# Patient Record
Sex: MALE | Race: BLACK OR AFRICAN AMERICAN | NOT HISPANIC OR LATINO | URBAN - METROPOLITAN AREA
[De-identification: names, ages, dates, MRNs, and addresses within clinical notes are randomized per-mention and may not be internally consistent; named-entity substitution may affect disease eponyms.]

---

## 2022-04-03 VITALS
DIASTOLIC BLOOD PRESSURE: 93 MMHG | OXYGEN SATURATION: 94 % | HEART RATE: 132 BPM | RESPIRATION RATE: 40 BRPM | SYSTOLIC BLOOD PRESSURE: 142 MMHG

## 2022-04-03 LAB
ALBUMIN SERPL ELPH-MCNC: 2.7 G/DL — LOW (ref 3.4–5)
ALP SERPL-CCNC: 65 U/L — SIGNIFICANT CHANGE UP (ref 40–120)
ALT FLD-CCNC: 80 U/L — HIGH (ref 12–42)
ANION GAP SERPL CALC-SCNC: 10 MMOL/L — SIGNIFICANT CHANGE UP (ref 9–16)
APTT BLD: 35.3 SEC — SIGNIFICANT CHANGE UP (ref 27.5–35.5)
AST SERPL-CCNC: 58 U/L — HIGH (ref 15–37)
BASOPHILS # BLD AUTO: 0.02 K/UL — SIGNIFICANT CHANGE UP (ref 0–0.2)
BASOPHILS NFR BLD AUTO: 0.1 % — SIGNIFICANT CHANGE UP (ref 0–2)
BILIRUB SERPL-MCNC: 1.7 MG/DL — HIGH (ref 0.2–1.2)
BUN SERPL-MCNC: 18 MG/DL — SIGNIFICANT CHANGE UP (ref 7–23)
CALCIUM SERPL-MCNC: 8.4 MG/DL — LOW (ref 8.5–10.5)
CHLORIDE SERPL-SCNC: 100 MMOL/L — SIGNIFICANT CHANGE UP (ref 96–108)
CK MB BLD-MCNC: 0.17 % — SIGNIFICANT CHANGE UP
CK MB CFR SERPL CALC: 0.6 NG/ML — SIGNIFICANT CHANGE UP (ref 0.5–3.6)
CO2 SERPL-SCNC: 24 MMOL/L — SIGNIFICANT CHANGE UP (ref 22–31)
CREAT SERPL-MCNC: 1.97 MG/DL — HIGH (ref 0.5–1.3)
EGFR: 42 ML/MIN/1.73M2 — LOW
EOSINOPHIL # BLD AUTO: 0 K/UL — SIGNIFICANT CHANGE UP (ref 0–0.5)
EOSINOPHIL NFR BLD AUTO: 0 % — SIGNIFICANT CHANGE UP (ref 0–6)
GLUCOSE SERPL-MCNC: 148 MG/DL — HIGH (ref 70–99)
HCT VFR BLD CALC: 33.8 % — LOW (ref 39–50)
HGB BLD-MCNC: 11.5 G/DL — LOW (ref 13–17)
IMM GRANULOCYTES NFR BLD AUTO: 0.4 % — SIGNIFICANT CHANGE UP (ref 0–1.5)
INR BLD: 2.27 — HIGH (ref 0.88–1.16)
LACTATE SERPL-SCNC: 3.8 MMOL/L — HIGH (ref 0.4–2)
LYMPHOCYTES # BLD AUTO: 1.28 K/UL — SIGNIFICANT CHANGE UP (ref 1–3.3)
LYMPHOCYTES # BLD AUTO: 9.2 % — LOW (ref 13–44)
MAGNESIUM SERPL-MCNC: 1.6 MG/DL — SIGNIFICANT CHANGE UP (ref 1.6–2.6)
MCHC RBC-ENTMCNC: 28.5 PG — SIGNIFICANT CHANGE UP (ref 27–34)
MCHC RBC-ENTMCNC: 34 GM/DL — SIGNIFICANT CHANGE UP (ref 32–36)
MCV RBC AUTO: 83.7 FL — SIGNIFICANT CHANGE UP (ref 80–100)
MONOCYTES # BLD AUTO: 1.03 K/UL — HIGH (ref 0–0.9)
MONOCYTES NFR BLD AUTO: 7.4 % — SIGNIFICANT CHANGE UP (ref 2–14)
NEUTROPHILS # BLD AUTO: 11.48 K/UL — HIGH (ref 1.8–7.4)
NEUTROPHILS NFR BLD AUTO: 82.9 % — HIGH (ref 43–77)
NRBC # BLD: 0 /100 WBCS — SIGNIFICANT CHANGE UP (ref 0–0)
NT-PROBNP SERPL-SCNC: HIGH PG/ML
PCO2 BLDV: 39 MMHG — LOW (ref 42–55)
PCO2 BLDV: 39 MMHG — LOW (ref 42–55)
PH BLDV: 7.43 — SIGNIFICANT CHANGE UP (ref 7.32–7.43)
PH BLDV: 7.44 — HIGH (ref 7.32–7.43)
PLATELET # BLD AUTO: 283 K/UL — SIGNIFICANT CHANGE UP (ref 150–400)
PO2 BLDV: 113 MMHG — HIGH (ref 25–45)
PO2 BLDV: 35 MMHG — SIGNIFICANT CHANGE UP (ref 25–45)
POTASSIUM SERPL-MCNC: 4.4 MMOL/L — SIGNIFICANT CHANGE UP (ref 3.5–5.3)
POTASSIUM SERPL-SCNC: 4.4 MMOL/L — SIGNIFICANT CHANGE UP (ref 3.5–5.3)
PROT SERPL-MCNC: 7.3 G/DL — SIGNIFICANT CHANGE UP (ref 6.4–8.2)
PROTHROM AB SERPL-ACNC: 26.5 SEC — HIGH (ref 10.5–13.4)
RBC # BLD: 4.04 M/UL — LOW (ref 4.2–5.8)
RBC # FLD: 16.4 % — HIGH (ref 10.3–14.5)
SAO2 % BLDV: 54.5 % — LOW (ref 67–88)
SAO2 % BLDV: 97.9 % — HIGH (ref 67–88)
SODIUM SERPL-SCNC: 134 MMOL/L — SIGNIFICANT CHANGE UP (ref 132–145)
TROPONIN I, HIGH SENSITIVITY RESULT: 170.4 NG/L — HIGH
WBC # BLD: 13.87 K/UL — HIGH (ref 3.8–10.5)
WBC # FLD AUTO: 13.87 K/UL — HIGH (ref 3.8–10.5)

## 2022-04-03 PROCEDURE — 71045 X-RAY EXAM CHEST 1 VIEW: CPT | Mod: 26

## 2022-04-03 RX ORDER — NITROGLYCERIN 6.5 MG
20 CAPSULE, EXTENDED RELEASE ORAL
Qty: 50 | Refills: 0 | Status: DISCONTINUED | OUTPATIENT
Start: 2022-04-03 | End: 2022-04-04

## 2022-04-03 RX ORDER — MORPHINE SULFATE 50 MG/1
2 CAPSULE, EXTENDED RELEASE ORAL ONCE
Refills: 0 | Status: DISCONTINUED | OUTPATIENT
Start: 2022-04-03 | End: 2022-04-03

## 2022-04-03 RX ORDER — FUROSEMIDE 40 MG
80 TABLET ORAL ONCE
Refills: 0 | Status: COMPLETED | OUTPATIENT
Start: 2022-04-03 | End: 2022-04-03

## 2022-04-03 RX ORDER — CEFTRIAXONE 500 MG/1
1000 INJECTION, POWDER, FOR SOLUTION INTRAMUSCULAR; INTRAVENOUS ONCE
Refills: 0 | Status: DISCONTINUED | OUTPATIENT
Start: 2022-04-03 | End: 2022-04-03

## 2022-04-03 RX ORDER — CEFTRIAXONE 500 MG/1
1000 INJECTION, POWDER, FOR SOLUTION INTRAMUSCULAR; INTRAVENOUS ONCE
Refills: 0 | Status: COMPLETED | OUTPATIENT
Start: 2022-04-03 | End: 2022-04-03

## 2022-04-03 RX ADMIN — Medication 10 MILLIGRAM(S): at 23:15

## 2022-04-03 RX ADMIN — CEFTRIAXONE 100 MILLIGRAM(S): 500 INJECTION, POWDER, FOR SOLUTION INTRAMUSCULAR; INTRAVENOUS at 23:58

## 2022-04-03 RX ADMIN — Medication 80 MILLIGRAM(S): at 23:10

## 2022-04-03 RX ADMIN — Medication 6 MICROGRAM(S)/MIN: at 23:16

## 2022-04-03 NOTE — ED PROVIDER NOTE - NSICDXPASTMEDICALHX_GEN_ALL_CORE_FT
PAST MEDICAL HISTORY:  Acute myocardial infarction     Acute on chronic systolic congestive heart failure

## 2022-04-03 NOTE — ED ADULT TRIAGE NOTE - CHIEF COMPLAINT QUOTE
Pt brought in by EMS as a notification for respiratory distress and shortness of breath since yesterday. Arrived with bipap in place. Pt with SpO2 in the 60s on RA as per EMS.

## 2022-04-03 NOTE — ED PROVIDER NOTE - CLINICAL SUMMARY MEDICAL DECISION MAKING FREE TEXT BOX
Pt is severe respiratory distress, stat PCXR reveals pulmonary edema by my reading at bedside. IV nitro ordered for pre-load reduction, iv lasix. Pt with marked improvement in Bipap, sats now 96% 10/5 FIO2 50%. Pt is severe respiratory distress, stat PCXR reveals pulmonary edema by my reading at bedside. IV nitro ordered for pre-load reduction, iv lasix. Pt with marked improvement in Bipap, sats now 96% 10/5 FIO2 50%.    2340: ax temp 102, ceftriaxone ordered, cultures obtained. Discussed with Dr. Colindres CCU attending [facility], accepts pt for admission.

## 2022-04-03 NOTE — ED PROVIDER NOTE - OBJECTIVE STATEMENT
40 yo male pmhx MI, CHF biba for acute respiratory distress. Pt states progressive dyspnea over past 24 hours. Pt arrives ALS with no IV access, on CPAP on arrival. EMS reports sats of 60% on RA upon their arrival. Pt on lasix/metoprolol, xarelto. History is limited secondary to acute respiratory distress.

## 2022-04-04 ENCOUNTER — INPATIENT (INPATIENT)
Facility: HOSPITAL | Age: 42
LOS: 3 days | Discharge: HOME | DRG: 974 | End: 2022-04-08
Attending: INTERNAL MEDICINE | Admitting: INTERNAL MEDICINE
Payer: MEDICARE

## 2022-04-04 DIAGNOSIS — R09.89 OTHER SPECIFIED SYMPTOMS AND SIGNS INVOLVING THE CIRCULATORY AND RESPIRATORY SYSTEMS: ICD-10-CM

## 2022-04-04 LAB
4/8 RATIO: 0.83 RATIO — LOW (ref 0.9–3.6)
A1C WITH ESTIMATED AVERAGE GLUCOSE RESULT: 5.2 % — SIGNIFICANT CHANGE UP (ref 4–5.6)
ABS CD8: 214 /UL — SIGNIFICANT CHANGE UP (ref 142–740)
ALBUMIN SERPL ELPH-MCNC: 2.9 G/DL — LOW (ref 3.3–5)
ALBUMIN SERPL ELPH-MCNC: 3.1 G/DL — LOW (ref 3.3–5)
ALBUMIN SERPL ELPH-MCNC: 3.1 G/DL — LOW (ref 3.3–5)
ALP SERPL-CCNC: 64 U/L — SIGNIFICANT CHANGE UP (ref 40–120)
ALP SERPL-CCNC: 68 U/L — SIGNIFICANT CHANGE UP (ref 40–120)
ALP SERPL-CCNC: 71 U/L — SIGNIFICANT CHANGE UP (ref 40–120)
ALT FLD-CCNC: 63 U/L — HIGH (ref 10–45)
ALT FLD-CCNC: 64 U/L — HIGH (ref 10–45)
ALT FLD-CCNC: 64 U/L — HIGH (ref 10–45)
ANION GAP SERPL CALC-SCNC: 10 MMOL/L — SIGNIFICANT CHANGE UP (ref 5–17)
ANION GAP SERPL CALC-SCNC: 14 MMOL/L — SIGNIFICANT CHANGE UP (ref 5–17)
ANION GAP SERPL CALC-SCNC: 9 MMOL/L — SIGNIFICANT CHANGE UP (ref 5–17)
APPEARANCE UR: CLEAR — SIGNIFICANT CHANGE UP
APTT BLD: 32.3 SEC — SIGNIFICANT CHANGE UP (ref 27.5–35.5)
APTT BLD: 33.8 SEC — SIGNIFICANT CHANGE UP (ref 27.5–35.5)
APTT BLD: 36.7 SEC — HIGH (ref 27.5–35.5)
AST SERPL-CCNC: 53 U/L — HIGH (ref 10–40)
AST SERPL-CCNC: 63 U/L — HIGH (ref 10–40)
AST SERPL-CCNC: 71 U/L — HIGH (ref 10–40)
BACTERIA # UR AUTO: PRESENT /HPF
BASE EXCESS BLDA CALC-SCNC: 3.2 MMOL/L — HIGH (ref -2–3)
BASE EXCESS BLDA CALC-SCNC: 4.2 MMOL/L — HIGH (ref -2–3)
BASOPHILS # BLD AUTO: 0.02 K/UL — SIGNIFICANT CHANGE UP (ref 0–0.2)
BASOPHILS NFR BLD AUTO: 0.1 % — SIGNIFICANT CHANGE UP (ref 0–2)
BILIRUB DIRECT SERPL-MCNC: 0.4 MG/DL — HIGH (ref 0–0.3)
BILIRUB INDIRECT FLD-MCNC: 0.5 MG/DL — SIGNIFICANT CHANGE UP (ref 0.2–1)
BILIRUB SERPL-MCNC: 0.8 MG/DL — SIGNIFICANT CHANGE UP (ref 0.2–1.2)
BILIRUB SERPL-MCNC: 1.2 MG/DL — SIGNIFICANT CHANGE UP (ref 0.2–1.2)
BILIRUB SERPL-MCNC: 1.6 MG/DL — HIGH (ref 0.2–1.2)
BILIRUB UR-MCNC: NEGATIVE — SIGNIFICANT CHANGE UP
BLD GP AB SCN SERPL QL: NEGATIVE — SIGNIFICANT CHANGE UP
BLD GP AB SCN SERPL QL: NEGATIVE — SIGNIFICANT CHANGE UP
BUN SERPL-MCNC: 19 MG/DL — SIGNIFICANT CHANGE UP (ref 7–23)
BUN SERPL-MCNC: 19 MG/DL — SIGNIFICANT CHANGE UP (ref 7–23)
BUN SERPL-MCNC: 21 MG/DL — SIGNIFICANT CHANGE UP (ref 7–23)
CALCIUM SERPL-MCNC: 8.2 MG/DL — LOW (ref 8.4–10.5)
CALCIUM SERPL-MCNC: 8.4 MG/DL — SIGNIFICANT CHANGE UP (ref 8.4–10.5)
CALCIUM SERPL-MCNC: 8.5 MG/DL — SIGNIFICANT CHANGE UP (ref 8.4–10.5)
CD16+CD56+ CELLS NFR BLD: 8 % — SIGNIFICANT CHANGE UP (ref 5–23)
CD16+CD56+ CELLS NFR SPEC: 55 /UL — LOW (ref 71–410)
CD19 BLASTS SPEC-ACNC: 204 /UL — SIGNIFICANT CHANGE UP (ref 84–469)
CD19 BLASTS SPEC-ACNC: 29 % — HIGH (ref 6–24)
CD3 BLASTS SPEC-ACNC: 413 /UL — LOW (ref 672–1870)
CD3 BLASTS SPEC-ACNC: 62 % — SIGNIFICANT CHANGE UP (ref 59–83)
CD4 %: 28 % — LOW (ref 30–62)
CD8 %: 34 % — SIGNIFICANT CHANGE UP (ref 12–36)
CHLORIDE SERPL-SCNC: 100 MMOL/L — SIGNIFICANT CHANGE UP (ref 96–108)
CHOLEST SERPL-MCNC: 73 MG/DL — SIGNIFICANT CHANGE UP
CK MB CFR SERPL CALC: 2.1 NG/ML — SIGNIFICANT CHANGE UP (ref 0–6.7)
CK SERPL-CCNC: 344 U/L — HIGH (ref 30–200)
CO2 BLDA-SCNC: 30 MMOL/L — HIGH (ref 19–24)
CO2 BLDA-SCNC: 30 MMOL/L — HIGH (ref 19–24)
CO2 SERPL-SCNC: 23 MMOL/L — SIGNIFICANT CHANGE UP (ref 22–31)
CO2 SERPL-SCNC: 27 MMOL/L — SIGNIFICANT CHANGE UP (ref 22–31)
CO2 SERPL-SCNC: 27 MMOL/L — SIGNIFICANT CHANGE UP (ref 22–31)
COLOR SPEC: YELLOW — SIGNIFICANT CHANGE UP
COMMENT - URINE: SIGNIFICANT CHANGE UP
CREAT ?TM UR-MCNC: 120 MG/DL — SIGNIFICANT CHANGE UP
CREAT SERPL-MCNC: 1.6 MG/DL — HIGH (ref 0.5–1.3)
CREAT SERPL-MCNC: 1.61 MG/DL — HIGH (ref 0.5–1.3)
CREAT SERPL-MCNC: 1.76 MG/DL — HIGH (ref 0.5–1.3)
CRP SERPL-MCNC: 275 MG/L — HIGH (ref 0–4)
D DIMER BLD IA.RAPID-MCNC: 629 NG/ML DDU — HIGH
DIFF PNL FLD: ABNORMAL
EGFR: 49 ML/MIN/1.73M2 — LOW
EGFR: 54 ML/MIN/1.73M2 — LOW
EGFR: 54 ML/MIN/1.73M2 — LOW
EOSINOPHIL # BLD AUTO: 0 K/UL — SIGNIFICANT CHANGE UP (ref 0–0.5)
EOSINOPHIL NFR BLD AUTO: 0 % — SIGNIFICANT CHANGE UP (ref 0–6)
EPI CELLS # UR: SIGNIFICANT CHANGE UP /HPF (ref 0–5)
ERYTHROCYTE [SEDIMENTATION RATE] IN BLOOD: 75 MM/HR — HIGH
ESTIMATED AVERAGE GLUCOSE: 103 MG/DL — SIGNIFICANT CHANGE UP (ref 68–114)
FERRITIN SERPL-MCNC: 472 NG/ML — HIGH (ref 30–400)
FLUAV AG NPH QL: SIGNIFICANT CHANGE UP
FLUBV AG NPH QL: SIGNIFICANT CHANGE UP
GAS PNL BLDA: SIGNIFICANT CHANGE UP
GLUCOSE BLDC GLUCOMTR-MCNC: 142 MG/DL — HIGH (ref 70–99)
GLUCOSE BLDC GLUCOMTR-MCNC: 144 MG/DL — HIGH (ref 70–99)
GLUCOSE BLDC GLUCOMTR-MCNC: 175 MG/DL — HIGH (ref 70–99)
GLUCOSE BLDC GLUCOMTR-MCNC: 200 MG/DL — HIGH (ref 70–99)
GLUCOSE SERPL-MCNC: 130 MG/DL — HIGH (ref 70–99)
GLUCOSE SERPL-MCNC: 162 MG/DL — HIGH (ref 70–99)
GLUCOSE SERPL-MCNC: 169 MG/DL — HIGH (ref 70–99)
GLUCOSE UR QL: NEGATIVE — SIGNIFICANT CHANGE UP
HAV IGM SER-ACNC: SIGNIFICANT CHANGE UP
HBV CORE IGM SER-ACNC: SIGNIFICANT CHANGE UP
HCO3 BLDA-SCNC: 28 MMOL/L — SIGNIFICANT CHANGE UP (ref 21–28)
HCO3 BLDA-SCNC: 28 MMOL/L — SIGNIFICANT CHANGE UP (ref 21–28)
HCT VFR BLD CALC: 31.8 % — LOW (ref 39–50)
HCT VFR BLD CALC: 32.5 % — LOW (ref 39–50)
HCV AB S/CO SERPL IA: 0.04 S/CO — SIGNIFICANT CHANGE UP
HCV AB SERPL-IMP: SIGNIFICANT CHANGE UP
HDLC SERPL-MCNC: 27 MG/DL — LOW
HGB BLD-MCNC: 11 G/DL — LOW (ref 13–17)
HGB BLD-MCNC: 11.2 G/DL — LOW (ref 13–17)
HYALINE CASTS # UR AUTO: ABNORMAL /LPF (ref 0–2)
IMM GRANULOCYTES NFR BLD AUTO: 1 % — SIGNIFICANT CHANGE UP (ref 0–1.5)
INR BLD: 1.82 — HIGH (ref 0.88–1.16)
KETONES UR-MCNC: NEGATIVE — SIGNIFICANT CHANGE UP
LACTATE SERPL-SCNC: 1.4 MMOL/L — SIGNIFICANT CHANGE UP (ref 0.5–2)
LACTATE SERPL-SCNC: 1.6 MMOL/L — SIGNIFICANT CHANGE UP (ref 0.5–2)
LDH SERPL L TO P-CCNC: 365 U/L — HIGH (ref 50–242)
LDH SERPL L TO P-CCNC: 456 U/L — HIGH (ref 50–242)
LEGIONELLA AG UR QL: NEGATIVE — SIGNIFICANT CHANGE UP
LEUKOCYTE ESTERASE UR-ACNC: ABNORMAL
LIPID PNL WITH DIRECT LDL SERPL: 30 MG/DL — SIGNIFICANT CHANGE UP
LYMPHOCYTES # BLD AUTO: 0.83 K/UL — LOW (ref 1–3.3)
LYMPHOCYTES # BLD AUTO: 5.8 % — LOW (ref 13–44)
MAGNESIUM SERPL-MCNC: 1.8 MG/DL — SIGNIFICANT CHANGE UP (ref 1.6–2.6)
MAGNESIUM SERPL-MCNC: 2.3 MG/DL — SIGNIFICANT CHANGE UP (ref 1.6–2.6)
MCHC RBC-ENTMCNC: 28.3 PG — SIGNIFICANT CHANGE UP (ref 27–34)
MCHC RBC-ENTMCNC: 28.5 PG — SIGNIFICANT CHANGE UP (ref 27–34)
MCHC RBC-ENTMCNC: 34.5 GM/DL — SIGNIFICANT CHANGE UP (ref 32–36)
MCHC RBC-ENTMCNC: 34.6 GM/DL — SIGNIFICANT CHANGE UP (ref 32–36)
MCV RBC AUTO: 81.7 FL — SIGNIFICANT CHANGE UP (ref 80–100)
MCV RBC AUTO: 82.7 FL — SIGNIFICANT CHANGE UP (ref 80–100)
MONOCYTES # BLD AUTO: 0.74 K/UL — SIGNIFICANT CHANGE UP (ref 0–0.9)
MONOCYTES NFR BLD AUTO: 5.1 % — SIGNIFICANT CHANGE UP (ref 2–14)
MRSA PCR RESULT.: NEGATIVE — SIGNIFICANT CHANGE UP
NEUTROPHILS # BLD AUTO: 12.66 K/UL — HIGH (ref 1.8–7.4)
NEUTROPHILS NFR BLD AUTO: 88 % — HIGH (ref 43–77)
NITRITE UR-MCNC: NEGATIVE — SIGNIFICANT CHANGE UP
NON HDL CHOLESTEROL: 46 MG/DL — SIGNIFICANT CHANGE UP
NRBC # BLD: 0 /100 WBCS — SIGNIFICANT CHANGE UP (ref 0–0)
NRBC # BLD: 0 /100 WBCS — SIGNIFICANT CHANGE UP (ref 0–0)
NT-PROBNP SERPL-SCNC: HIGH PG/ML (ref 0–300)
OSMOLALITY SERPL: 292 MOSM/KG — SIGNIFICANT CHANGE UP (ref 275–300)
OSMOLALITY UR: 372 MOSM/KG — SIGNIFICANT CHANGE UP (ref 300–900)
PCO2 BLDA: 40 MMHG — SIGNIFICANT CHANGE UP (ref 35–48)
PCO2 BLDA: 45 MMHG — SIGNIFICANT CHANGE UP (ref 35–48)
PCO2 BLDV: 39 MMHG — LOW (ref 42–55)
PH BLDA: 7.41 — SIGNIFICANT CHANGE UP (ref 7.35–7.45)
PH BLDA: 7.46 — HIGH (ref 7.35–7.45)
PH BLDV: 7.46 — HIGH (ref 7.32–7.43)
PH UR: 5.5 — SIGNIFICANT CHANGE UP (ref 5–8)
PHOSPHATE SERPL-MCNC: 3.4 MG/DL — SIGNIFICANT CHANGE UP (ref 2.5–4.5)
PLATELET # BLD AUTO: 256 K/UL — SIGNIFICANT CHANGE UP (ref 150–400)
PLATELET # BLD AUTO: 259 K/UL — SIGNIFICANT CHANGE UP (ref 150–400)
PO2 BLDA: 100 MMHG — SIGNIFICANT CHANGE UP (ref 83–108)
PO2 BLDA: 128 MMHG — HIGH (ref 83–108)
PO2 BLDV: 186 MMHG — HIGH (ref 25–45)
POTASSIUM SERPL-MCNC: 4.4 MMOL/L — SIGNIFICANT CHANGE UP (ref 3.5–5.3)
POTASSIUM SERPL-MCNC: 4.5 MMOL/L — SIGNIFICANT CHANGE UP (ref 3.5–5.3)
POTASSIUM SERPL-MCNC: 4.7 MMOL/L — SIGNIFICANT CHANGE UP (ref 3.5–5.3)
POTASSIUM SERPL-SCNC: 4.4 MMOL/L — SIGNIFICANT CHANGE UP (ref 3.5–5.3)
POTASSIUM SERPL-SCNC: 4.5 MMOL/L — SIGNIFICANT CHANGE UP (ref 3.5–5.3)
POTASSIUM SERPL-SCNC: 4.7 MMOL/L — SIGNIFICANT CHANGE UP (ref 3.5–5.3)
PROCALCITONIN SERPL-MCNC: 5.27 NG/ML — HIGH (ref 0.02–0.1)
PROT SERPL-MCNC: 6.6 G/DL — SIGNIFICANT CHANGE UP (ref 6–8.3)
PROT SERPL-MCNC: 7.1 G/DL — SIGNIFICANT CHANGE UP (ref 6–8.3)
PROT SERPL-MCNC: 7.3 G/DL — SIGNIFICANT CHANGE UP (ref 6–8.3)
PROT UR-MCNC: NEGATIVE MG/DL — SIGNIFICANT CHANGE UP
PROTHROM AB SERPL-ACNC: 21.8 SEC — HIGH (ref 10.5–13.4)
RBC # BLD: 3.89 M/UL — LOW (ref 4.2–5.8)
RBC # BLD: 3.93 M/UL — LOW (ref 4.2–5.8)
RBC # FLD: 16 % — HIGH (ref 10.3–14.5)
RBC # FLD: 16.4 % — HIGH (ref 10.3–14.5)
RBC CASTS # UR COMP ASSIST: ABNORMAL /HPF
RH IG SCN BLD-IMP: POSITIVE — SIGNIFICANT CHANGE UP
RH IG SCN BLD-IMP: POSITIVE — SIGNIFICANT CHANGE UP
RSV RNA NPH QL NAA+NON-PROBE: SIGNIFICANT CHANGE UP
S AUREUS DNA NOSE QL NAA+PROBE: NEGATIVE — SIGNIFICANT CHANGE UP
S PNEUM AG UR QL: NEGATIVE — SIGNIFICANT CHANGE UP
SAO2 % BLDA: 97.1 % — SIGNIFICANT CHANGE UP (ref 94–98)
SAO2 % BLDA: 98.1 % — HIGH (ref 94–98)
SAO2 % BLDV: 98.5 % — HIGH (ref 67–88)
SARS-COV-2 RNA SPEC QL NAA+PROBE: SIGNIFICANT CHANGE UP
SARS-COV-2 RNA SPEC QL NAA+PROBE: SIGNIFICANT CHANGE UP
SODIUM SERPL-SCNC: 136 MMOL/L — SIGNIFICANT CHANGE UP (ref 135–145)
SODIUM SERPL-SCNC: 137 MMOL/L — SIGNIFICANT CHANGE UP (ref 135–145)
SODIUM SERPL-SCNC: 137 MMOL/L — SIGNIFICANT CHANGE UP (ref 135–145)
SODIUM UR-SCNC: 41 MMOL/L — SIGNIFICANT CHANGE UP
SP GR SPEC: 1.02 — SIGNIFICANT CHANGE UP (ref 1–1.03)
T-CELL CD4 SUBSET PNL BLD: 178 /UL — LOW (ref 489–1457)
TRIGL SERPL-MCNC: 78 MG/DL — SIGNIFICANT CHANGE UP
TROPONIN T SERPL-MCNC: 0.03 NG/ML — HIGH (ref 0–0.01)
TROPONIN T SERPL-MCNC: <0.01 NG/ML — SIGNIFICANT CHANGE UP (ref 0–0.01)
UROBILINOGEN FLD QL: 1 E.U./DL — SIGNIFICANT CHANGE UP
UUN UR-MCNC: 444 MG/DL — SIGNIFICANT CHANGE UP
WBC # BLD: 13.45 K/UL — HIGH (ref 3.8–10.5)
WBC # BLD: 14.39 K/UL — HIGH (ref 3.8–10.5)
WBC # FLD AUTO: 13.45 K/UL — HIGH (ref 3.8–10.5)
WBC # FLD AUTO: 14.39 K/UL — HIGH (ref 3.8–10.5)
WBC UR QL: > 10 /HPF

## 2022-04-04 PROCEDURE — 36600 WITHDRAWAL OF ARTERIAL BLOOD: CPT

## 2022-04-04 PROCEDURE — 99053 MED SERV 10PM-8AM 24 HR FAC: CPT

## 2022-04-04 PROCEDURE — 93308 TTE F-UP OR LMTD: CPT | Mod: 26

## 2022-04-04 PROCEDURE — 93010 ELECTROCARDIOGRAM REPORT: CPT

## 2022-04-04 PROCEDURE — 93306 TTE W/DOPPLER COMPLETE: CPT | Mod: 26

## 2022-04-04 PROCEDURE — 93970 EXTREMITY STUDY: CPT | Mod: 26

## 2022-04-04 PROCEDURE — 99291 CRITICAL CARE FIRST HOUR: CPT

## 2022-04-04 PROCEDURE — 99292 CRITICAL CARE ADDL 30 MIN: CPT

## 2022-04-04 PROCEDURE — 71045 X-RAY EXAM CHEST 1 VIEW: CPT | Mod: 26

## 2022-04-04 RX ORDER — GLUCAGON INJECTION, SOLUTION 0.5 MG/.1ML
1 INJECTION, SOLUTION SUBCUTANEOUS ONCE
Refills: 0 | Status: DISCONTINUED | OUTPATIENT
Start: 2022-04-04 | End: 2022-04-08

## 2022-04-04 RX ORDER — PANTOPRAZOLE SODIUM 20 MG/1
40 TABLET, DELAYED RELEASE ORAL
Refills: 0 | Status: DISCONTINUED | OUTPATIENT
Start: 2022-04-04 | End: 2022-04-04

## 2022-04-04 RX ORDER — SODIUM CHLORIDE 9 MG/ML
1000 INJECTION, SOLUTION INTRAVENOUS
Refills: 0 | Status: DISCONTINUED | OUTPATIENT
Start: 2022-04-04 | End: 2022-04-08

## 2022-04-04 RX ORDER — FUROSEMIDE 40 MG
5 TABLET ORAL
Qty: 500 | Refills: 0 | Status: DISCONTINUED | OUTPATIENT
Start: 2022-04-04 | End: 2022-04-04

## 2022-04-04 RX ORDER — ATORVASTATIN CALCIUM 80 MG/1
80 TABLET, FILM COATED ORAL AT BEDTIME
Refills: 0 | Status: DISCONTINUED | OUTPATIENT
Start: 2022-04-04 | End: 2022-04-07

## 2022-04-04 RX ORDER — VANCOMYCIN HCL 1 G
1500 VIAL (EA) INTRAVENOUS ONCE
Refills: 0 | Status: COMPLETED | OUTPATIENT
Start: 2022-04-04 | End: 2022-04-04

## 2022-04-04 RX ORDER — HEPARIN SODIUM 5000 [USP'U]/ML
1000 INJECTION INTRAVENOUS; SUBCUTANEOUS
Qty: 25000 | Refills: 0 | Status: DISCONTINUED | OUTPATIENT
Start: 2022-04-04 | End: 2022-04-04

## 2022-04-04 RX ORDER — CLOPIDOGREL BISULFATE 75 MG/1
1 TABLET, FILM COATED ORAL
Qty: 0 | Refills: 0 | DISCHARGE

## 2022-04-04 RX ORDER — DEXTROSE 50 % IN WATER 50 %
25 SYRINGE (ML) INTRAVENOUS ONCE
Refills: 0 | Status: DISCONTINUED | OUTPATIENT
Start: 2022-04-04 | End: 2022-04-08

## 2022-04-04 RX ORDER — PIPERACILLIN AND TAZOBACTAM 4; .5 G/20ML; G/20ML
3.38 INJECTION, POWDER, LYOPHILIZED, FOR SOLUTION INTRAVENOUS EVERY 6 HOURS
Refills: 0 | Status: DISCONTINUED | OUTPATIENT
Start: 2022-04-04 | End: 2022-04-07

## 2022-04-04 RX ORDER — DEXTROSE 50 % IN WATER 50 %
15 SYRINGE (ML) INTRAVENOUS ONCE
Refills: 0 | Status: DISCONTINUED | OUTPATIENT
Start: 2022-04-04 | End: 2022-04-08

## 2022-04-04 RX ORDER — ACETAMINOPHEN 500 MG
975 TABLET ORAL ONCE
Refills: 0 | Status: COMPLETED | OUTPATIENT
Start: 2022-04-04 | End: 2022-04-04

## 2022-04-04 RX ORDER — MAGNESIUM SULFATE 500 MG/ML
2 VIAL (ML) INJECTION ONCE
Refills: 0 | Status: COMPLETED | OUTPATIENT
Start: 2022-04-04 | End: 2022-04-04

## 2022-04-04 RX ORDER — HEPARIN SODIUM 5000 [USP'U]/ML
1900 INJECTION INTRAVENOUS; SUBCUTANEOUS
Qty: 25000 | Refills: 0 | Status: DISCONTINUED | OUTPATIENT
Start: 2022-04-04 | End: 2022-04-05

## 2022-04-04 RX ORDER — DEXAMETHASONE 0.5 MG/5ML
6 ELIXIR ORAL DAILY
Refills: 0 | Status: DISCONTINUED | OUTPATIENT
Start: 2022-04-04 | End: 2022-04-04

## 2022-04-04 RX ORDER — DEXTROSE 50 % IN WATER 50 %
12.5 SYRINGE (ML) INTRAVENOUS ONCE
Refills: 0 | Status: DISCONTINUED | OUTPATIENT
Start: 2022-04-04 | End: 2022-04-08

## 2022-04-04 RX ORDER — PANTOPRAZOLE SODIUM 20 MG/1
40 TABLET, DELAYED RELEASE ORAL EVERY 24 HOURS
Refills: 0 | Status: DISCONTINUED | OUTPATIENT
Start: 2022-04-04 | End: 2022-04-04

## 2022-04-04 RX ORDER — PIPERACILLIN AND TAZOBACTAM 4; .5 G/20ML; G/20ML
3.38 INJECTION, POWDER, LYOPHILIZED, FOR SOLUTION INTRAVENOUS ONCE
Refills: 0 | Status: COMPLETED | OUTPATIENT
Start: 2022-04-04 | End: 2022-04-04

## 2022-04-04 RX ORDER — BICTEGRAVIR SODIUM, EMTRICITABINE, AND TENOFOVIR ALAFENAMIDE FUMARATE 30; 120; 15 MG/1; MG/1; MG/1
1 TABLET ORAL
Qty: 0 | Refills: 0 | DISCHARGE

## 2022-04-04 RX ORDER — ASPIRIN/CALCIUM CARB/MAGNESIUM 324 MG
1 TABLET ORAL
Qty: 0 | Refills: 0 | DISCHARGE

## 2022-04-04 RX ORDER — SODIUM CHLORIDE 9 MG/ML
4 INJECTION INTRAMUSCULAR; INTRAVENOUS; SUBCUTANEOUS ONCE
Refills: 0 | Status: COMPLETED | OUTPATIENT
Start: 2022-04-04 | End: 2022-04-04

## 2022-04-04 RX ORDER — CHLORHEXIDINE GLUCONATE 213 G/1000ML
1 SOLUTION TOPICAL
Refills: 0 | Status: DISCONTINUED | OUTPATIENT
Start: 2022-04-04 | End: 2022-04-08

## 2022-04-04 RX ORDER — PANTOPRAZOLE SODIUM 20 MG/1
40 TABLET, DELAYED RELEASE ORAL
Refills: 0 | Status: DISCONTINUED | OUTPATIENT
Start: 2022-04-05 | End: 2022-04-08

## 2022-04-04 RX ORDER — METOPROLOL TARTRATE 50 MG
1 TABLET ORAL
Qty: 0 | Refills: 0 | DISCHARGE

## 2022-04-04 RX ORDER — CHLORHEXIDINE GLUCONATE 213 G/1000ML
1 SOLUTION TOPICAL
Refills: 0 | Status: DISCONTINUED | OUTPATIENT
Start: 2022-04-04 | End: 2022-04-04

## 2022-04-04 RX ORDER — CLOPIDOGREL BISULFATE 75 MG/1
75 TABLET, FILM COATED ORAL DAILY
Refills: 0 | Status: DISCONTINUED | OUTPATIENT
Start: 2022-04-04 | End: 2022-04-08

## 2022-04-04 RX ORDER — DEXAMETHASONE 0.5 MG/5ML
10 ELIXIR ORAL ONCE
Refills: 0 | Status: COMPLETED | OUTPATIENT
Start: 2022-04-04 | End: 2022-04-03

## 2022-04-04 RX ORDER — INSULIN LISPRO 100/ML
VIAL (ML) SUBCUTANEOUS
Refills: 0 | Status: DISCONTINUED | OUTPATIENT
Start: 2022-04-04 | End: 2022-04-08

## 2022-04-04 RX ORDER — BICTEGRAVIR SODIUM, EMTRICITABINE, AND TENOFOVIR ALAFENAMIDE FUMARATE 30; 120; 15 MG/1; MG/1; MG/1
1 TABLET ORAL DAILY
Refills: 0 | Status: DISCONTINUED | OUTPATIENT
Start: 2022-04-04 | End: 2022-04-08

## 2022-04-04 RX ORDER — HEPARIN SODIUM 5000 [USP'U]/ML
1400 INJECTION INTRAVENOUS; SUBCUTANEOUS
Qty: 25000 | Refills: 0 | Status: DISCONTINUED | OUTPATIENT
Start: 2022-04-04 | End: 2022-04-04

## 2022-04-04 RX ORDER — INSULIN LISPRO 100/ML
VIAL (ML) SUBCUTANEOUS EVERY 6 HOURS
Refills: 0 | Status: DISCONTINUED | OUTPATIENT
Start: 2022-04-04 | End: 2022-04-04

## 2022-04-04 RX ADMIN — Medication 975 MILLIGRAM(S): at 00:05

## 2022-04-04 RX ADMIN — BICTEGRAVIR SODIUM, EMTRICITABINE, AND TENOFOVIR ALAFENAMIDE FUMARATE 1 TABLET(S): 30; 120; 15 TABLET ORAL at 11:48

## 2022-04-04 RX ADMIN — Medication 1: at 07:49

## 2022-04-04 RX ADMIN — PIPERACILLIN AND TAZOBACTAM 3.33 GRAM(S): 4; .5 INJECTION, POWDER, LYOPHILIZED, FOR SOLUTION INTRAVENOUS at 10:31

## 2022-04-04 RX ADMIN — MORPHINE SULFATE 2 MILLIGRAM(S): 50 CAPSULE, EXTENDED RELEASE ORAL at 00:07

## 2022-04-04 RX ADMIN — HEPARIN SODIUM 10 UNIT(S)/HR: 5000 INJECTION INTRAVENOUS; SUBCUTANEOUS at 04:05

## 2022-04-04 RX ADMIN — ATORVASTATIN CALCIUM 80 MILLIGRAM(S): 80 TABLET, FILM COATED ORAL at 22:20

## 2022-04-04 RX ADMIN — Medication 265.63 MILLIGRAM(S): at 19:33

## 2022-04-04 RX ADMIN — Medication 2.5 MG/HR: at 03:13

## 2022-04-04 RX ADMIN — PIPERACILLIN AND TAZOBACTAM 3.33 GRAM(S): 4; .5 INJECTION, POWDER, LYOPHILIZED, FOR SOLUTION INTRAVENOUS at 22:12

## 2022-04-04 RX ADMIN — PIPERACILLIN AND TAZOBACTAM 200 GRAM(S): 4; .5 INJECTION, POWDER, LYOPHILIZED, FOR SOLUTION INTRAVENOUS at 03:31

## 2022-04-04 RX ADMIN — PANTOPRAZOLE SODIUM 40 MILLIGRAM(S): 20 TABLET, DELAYED RELEASE ORAL at 05:55

## 2022-04-04 RX ADMIN — CLOPIDOGREL BISULFATE 75 MILLIGRAM(S): 75 TABLET, FILM COATED ORAL at 11:48

## 2022-04-04 RX ADMIN — Medication 25 GRAM(S): at 04:25

## 2022-04-04 RX ADMIN — Medication 300 MILLIGRAM(S): at 04:48

## 2022-04-04 RX ADMIN — SODIUM CHLORIDE 4 MILLILITER(S): 9 INJECTION INTRAMUSCULAR; INTRAVENOUS; SUBCUTANEOUS at 19:19

## 2022-04-04 RX ADMIN — Medication 40 MILLIGRAM(S): at 18:54

## 2022-04-04 RX ADMIN — PIPERACILLIN AND TAZOBACTAM 3.33 GRAM(S): 4; .5 INJECTION, POWDER, LYOPHILIZED, FOR SOLUTION INTRAVENOUS at 15:43

## 2022-04-04 NOTE — ED ADULT NURSE REASSESSMENT NOTE - NS ED NURSE REASSESS COMMENT FT1
Pt. worked up for CHF exacerbation and sepsis. Pt. currently remains on BiPap and tolerating well. Pt. remains on continuous pulse ox and cardiac monitoring awaiting transfer to [facility].

## 2022-04-04 NOTE — H&P ADULT - NSHPLABSRESULTS_GEN_ALL_CORE
LABS:                         11.0   14.39 >-----< 256           ( 22 @ 03:29 )             31.8       134  |  100  |   18  ----------------------< 148    (22 @ 23:18)     4.4  |  24  |  1.97    Anion Gap: 10    Ca   8.4   (22 @ 23:18)  Mg   1.6   (22 @ 23:18)  Phos x    (22 23:18)       TP 8.4     |  AST 2.7    -------------------------     Alb x     |  ALT x               (22 @ 23:18)  -------------------------     T-bili 2.7  |  AlkPh 65    D-bili x   COAGULATION STUDIES:     aPTT  35.3 sec    (22 @ 23:18)     PT     26.5 sec    (22 @ 23:18)     INR    2.27          (22 @ 23:18)   ABG - ( 2022 03:05 )  pH, Arterial: 7.41  pH, Blood: x     /  pCO2: 45    /  pO2: 128   / HCO3: 28    / Base Excess: 3.2   /  SaO2: 98.1              POCT Blood Glucose.: 142 mg/dL (22 @ 23:12)    Urinalysis Basic - ( 2022 02:44 )    Color: Yellow / Appearance: Clear / S.020 / pH: x  Gluc: x / Ketone: NEGATIVE  / Bili: Negative / Urobili: 1.0 E.U./dL   Blood: x / Protein: NEGATIVE mg/dL / Nitrite: NEGATIVE   Leuk Esterase: Small / RBC: 5-10 /HPF / WBC > 10 /HPF   Sq Epi: x / Non Sq Epi: 0-5 /HPF / Bacteria: Present /HPF      I/O SUMMARY:    22 @ 07:01  -  22 @ 03:36  --------------------------------------------------------  IN: 25 mL / OUT: 770 mL / NET: -745 mL

## 2022-04-04 NOTE — PROGRESS NOTE ADULT - SUBJECTIVE AND OBJECTIVE BOX
CCU Progress Note  Jon Jon, PGY-1  Pager: [phone]    ******INCOMPLETE******    Patient is a 41y old  Male who presents with a chief complaint of   OVERNIGHT EVENTS/INTERVAL HPI:    REVIEW OF SYSTEMS:  All other review of systems is negative unless indicated above.    OBJECTIVE:  T(C): 36.8 (22 @ 06:28), Max: 39.2 (22 @ 23:53)  HR: 88 (22 @ 07:00) (88 - 132)  BP: 100/65 (22 @ 07:00) (95/70 - 147/65)  RR: 30 (22 @ 07:00) (25 - 60)  SpO2: 97% (22 @ 07:00) (90% - 99%)  Daily Height in cm: 187.96 (2022 01:59)    Daily     Physical Exam:  General: in no acute distress  Eyes: EOMI intact bilaterally. Anicteric sclerae, moist conjunctivae  HENT: Moist mucous membranes  Neck: Trachea midline, supple  Lungs: CTA B/L. No wheezes, rales, or ronchi  Cardiovascular: RRR. No murmurs, rubs, or gallops  Abdomen: Soft, non-tender non-distended; No rebound or guarding  Extremities: Normal range of motion, No clubbing, cyanosis or edema  MSK: No midline bony tenderness. No CVA tenderness bilaterally  Neurological: Alert and oriented x3  Skin: Warm and dry. No obvious rash     Medications:  MEDICATIONS  (STANDING):  atorvastatin 80 milliGRAM(s) Oral at bedtime  bictegravir 50 mG/emtricitabine 200 mG/tenofovir alafenamide 25 mG (BIKTARVY) 1 Tablet(s) Oral daily  chlorhexidine 4% Liquid 1 Application(s) Topical <User Schedule>  clopidogrel Tablet 75 milliGRAM(s) Oral daily  dexAMETHasone     Tablet 6 milliGRAM(s) Oral daily  dextrose 5%. 1000 milliLiter(s) (50 mL/Hr) IV Continuous <Continuous>  dextrose 5%. 1000 milliLiter(s) (100 mL/Hr) IV Continuous <Continuous>  dextrose 50% Injectable 25 Gram(s) IV Push once  dextrose 50% Injectable 12.5 Gram(s) IV Push once  dextrose 50% Injectable 25 Gram(s) IV Push once  furosemide Infusion 5 mG/Hr (2.5 mL/Hr) IV Continuous <Continuous>  glucagon  Injectable 1 milliGRAM(s) IntraMuscular once  heparin  Infusion 1000 Unit(s)/Hr (10 mL/Hr) IV Continuous <Continuous>  insulin lispro (ADMELOG) corrective regimen sliding scale   SubCutaneous three times a day before meals  pantoprazole  Injectable 40 milliGRAM(s) IV Push every 24 hours  piperacillin/tazobactam IVPB.. 3.375 Gram(s) IV Intermittent every 6 hours    MEDICATIONS  (PRN):  dextrose Oral Gel 15 Gram(s) Oral once PRN Blood Glucose LESS THAN 70 milliGRAM(s)/deciliter      Labs:                        11.0   14.39 )-----------( 256      ( 2022 03:29 )             31.8     04-04    137  |  100  |  19  ----------------------------<  130<H>  4.4   |  23  |  1.76<H>    Ca    8.4      2022 03:29  Phos  3.4     04-04  Mg     1.8     04-04    TPro  7.1  /  Alb  3.1<L>  /  TBili  1.6<H>  /  DBili  x   /  AST  63<H>  /  ALT  63<H>  /  AlkPhos  71  04-04    PT/INR - ( 2022 03:29 )   PT: 21.8 sec;   INR: 1.82          PTT - ( 2022 03:29 )  PTT:32.3 sec  Urinalysis Basic - ( 2022 02:44 )    Color: Yellow / Appearance: Clear / S.020 / pH: x  Gluc: x / Ketone: NEGATIVE  / Bili: Negative / Urobili: 1.0 E.U./dL   Blood: x / Protein: NEGATIVE mg/dL / Nitrite: NEGATIVE   Leuk Esterase: Small / RBC: 5-10 /HPF / WBC > 10 /HPF   Sq Epi: x / Non Sq Epi: 0-5 /HPF / Bacteria: Present /HPF          Radiology: Reviewed CCU Progress Note  Jon Jon, PGY-1  Pager: [phone]    ******INCOMPLETE******    Patient is a 41y old  Male who presents with a chief complaint of     OVERNIGHT EVENTS/INTERVAL HPI: As per night team, no overnight events. Patient seen and examined at bedside. ________________. Otherwise, no complaints at this time. Patient denies fevers, sweats, chills, SOB, dyspnea, chest pain, nausea/vomiting, diarrhea, constipation, abdominal pain. 12 pt ROS otherwise negative.     REVIEW OF SYSTEMS:  All other review of systems is negative unless indicated above.    OBJECTIVE:  T(C): 36.8 (22 @ 06:28), Max: 39.2 (22 @ 23:53)  HR: 88 (22 @ 07:00) (88 - 132)  BP: 100/65 (22 @ 07:00) (95/70 - 147/65)  RR: 30 (22 @ 07:00) (25 - 60)  SpO2: 97% (22 @ 07:00) (90% - 99%)  Daily Height in cm: 187.96 (2022 01:59)    Daily     Physical Exam:  General: in no acute distress  Eyes: EOMI intact bilaterally. Anicteric sclerae, moist conjunctivae  HENT: Moist mucous membranes  Neck: Trachea midline, supple  Lungs: CTA B/L. No wheezes, rales, or ronchi  Cardiovascular: RRR. No murmurs, rubs, or gallops  Abdomen: Soft, non-tender non-distended; No rebound or guarding  Extremities: WWP, No clubbing, cyanosis. 2+ pitting edema  Neurological: Alert and oriented x3  Skin: Warm and dry. No obvious rash     Medications:  MEDICATIONS  (STANDING):  atorvastatin 80 milliGRAM(s) Oral at bedtime  bictegravir 50 mG/emtricitabine 200 mG/tenofovir alafenamide 25 mG (BIKTARVY) 1 Tablet(s) Oral daily  chlorhexidine 4% Liquid 1 Application(s) Topical <User Schedule>  clopidogrel Tablet 75 milliGRAM(s) Oral daily  dexAMETHasone     Tablet 6 milliGRAM(s) Oral daily  dextrose 5%. 1000 milliLiter(s) (50 mL/Hr) IV Continuous <Continuous>  dextrose 5%. 1000 milliLiter(s) (100 mL/Hr) IV Continuous <Continuous>  dextrose 50% Injectable 25 Gram(s) IV Push once  dextrose 50% Injectable 12.5 Gram(s) IV Push once  dextrose 50% Injectable 25 Gram(s) IV Push once  furosemide Infusion 5 mG/Hr (2.5 mL/Hr) IV Continuous <Continuous>  glucagon  Injectable 1 milliGRAM(s) IntraMuscular once  heparin  Infusion 1000 Unit(s)/Hr (10 mL/Hr) IV Continuous <Continuous>  insulin lispro (ADMELOG) corrective regimen sliding scale   SubCutaneous three times a day before meals  pantoprazole  Injectable 40 milliGRAM(s) IV Push every 24 hours  piperacillin/tazobactam IVPB.. 3.375 Gram(s) IV Intermittent every 6 hours    MEDICATIONS  (PRN):  dextrose Oral Gel 15 Gram(s) Oral once PRN Blood Glucose LESS THAN 70 milliGRAM(s)/deciliter      Labs:                        11.0   14.39 )-----------( 256      ( 2022 03:29 )             31.8     04-04    137  |  100  |  19  ----------------------------<  130<H>  4.4   |  23  |  1.76<H>    Ca    8.4      2022 03:29  Phos  3.4     04-04  Mg     1.8     04-04    TPro  7.1  /  Alb  3.1<L>  /  TBili  1.6<H>  /  DBili  x   /  AST  63<H>  /  ALT  63<H>  /  AlkPhos  71  04-04    PT/INR - ( 2022 03:29 )   PT: 21.8 sec;   INR: 1.82          PTT - ( 2022 03:29 )  PTT:32.3 sec  Urinalysis Basic - ( 2022 02:44 )    Color: Yellow / Appearance: Clear / S.020 / pH: x  Gluc: x / Ketone: NEGATIVE  / Bili: Negative / Urobili: 1.0 E.U./dL   Blood: x / Protein: NEGATIVE mg/dL / Nitrite: NEGATIVE   Leuk Esterase: Small / RBC: 5-10 /HPF / WBC > 10 /HPF   Sq Epi: x / Non Sq Epi: 0-5 /HPF / Bacteria: Present /HPF          Radiology: Reviewed CCU Progress Note  Jon Jon, PGY-1  Pager: [phone]    Patient is a 41y old  Male who presents with a chief complaint of acute hypoxic respiratory failure    OVERNIGHT EVENTS/INTERVAL HPI: As per night team, remained on BIPAP overnight. Patient seen and examined at bedside. Notes that his SOB has improved on BIPAP. Offers no new complaints at this time. Patient currently denies fevers, sweats, chills, dyspnea, chest pain, nausea/vomiting, abdominal pain, dysuria.    REVIEW OF SYSTEMS:  All other review of systems is negative unless indicated above.    OBJECTIVE:  T(C): 36.8 (22 @ 06:28), Max: 39.2 (22 @ 23:53)  HR: 88 (22 @ 07:00) (88 - 132)  BP: 100/65 (22 @ 07:00) (95/70 - 147/65)  RR: 30 (22 @ 07:00) (25 - 60)  SpO2: 97% (22 @ 07:00) (90% - 99%)  Daily Height in cm: 187.96 (2022 01:59)    Daily     Physical Exam:  General: in no acute distress, resting comfortably on BIPAP  Eyes: EOMI intact bilaterally. Anicteric sclerae, moist conjunctivae. Yellow crusting on left eye. No ptosis.  HENT: Moist mucous membranes  Neck: Trachea midline, supple  Lungs: CTA B/L. No wheezes or ronchi. Bibasilar rales.  Cardiovascular: RRR. No murmurs, rubs, or gallops  Abdomen: Obese abdomen. Soft, non-tender non-distended; No rebound or guarding.  Extremities: WWP, No clubbing, cyanosis. 2+ pitting edema. No calf tenderness bilaterally.  MSK: No midline bony tenderness. No CVA tenderness bilaterally.  Neurological: Alert and oriented x3  Skin: Warm and dry. No obvious rash. Venous stasis changes on b/l LE.    Medications:  MEDICATIONS  (STANDING):  atorvastatin 80 milliGRAM(s) Oral at bedtime  bictegravir 50 mG/emtricitabine 200 mG/tenofovir alafenamide 25 mG (BIKTARVY) 1 Tablet(s) Oral daily  chlorhexidine 4% Liquid 1 Application(s) Topical <User Schedule>  clopidogrel Tablet 75 milliGRAM(s) Oral daily  dexAMETHasone     Tablet 6 milliGRAM(s) Oral daily  dextrose 5%. 1000 milliLiter(s) (50 mL/Hr) IV Continuous <Continuous>  dextrose 5%. 1000 milliLiter(s) (100 mL/Hr) IV Continuous <Continuous>  dextrose 50% Injectable 25 Gram(s) IV Push once  dextrose 50% Injectable 12.5 Gram(s) IV Push once  dextrose 50% Injectable 25 Gram(s) IV Push once  furosemide Infusion 5 mG/Hr (2.5 mL/Hr) IV Continuous <Continuous>  glucagon  Injectable 1 milliGRAM(s) IntraMuscular once  heparin  Infusion 1000 Unit(s)/Hr (10 mL/Hr) IV Continuous <Continuous>  insulin lispro (ADMELOG) corrective regimen sliding scale   SubCutaneous three times a day before meals  pantoprazole  Injectable 40 milliGRAM(s) IV Push every 24 hours  piperacillin/tazobactam IVPB.. 3.375 Gram(s) IV Intermittent every 6 hours    MEDICATIONS  (PRN):  dextrose Oral Gel 15 Gram(s) Oral once PRN Blood Glucose LESS THAN 70 milliGRAM(s)/deciliter      Labs:                        11.0   14.39 )-----------( 256      ( 2022 03:29 )             31.8     04-04    137  |  100  |  19  ----------------------------<  130<H>  4.4   |  23  |  1.76<H>    Ca    8.4      2022 03:29  Phos  3.4     04-04  Mg     1.8     04-04    TPro  7.1  /  Alb  3.1<L>  /  TBili  1.6<H>  /  DBili  x   /  AST  63<H>  /  ALT  63<H>  /  AlkPhos  71  04-04    PT/INR - ( 2022 03:29 )   PT: 21.8 sec;   INR: 1.82     PTT - ( 2022 03:29 )  PTT:32.3 sec    Urinalysis Basic - ( 2022 02:44 )    Color: Yellow / Appearance: Clear / S.020 / pH: x  Gluc: x / Ketone: NEGATIVE  / Bili: Negative / Urobili: 1.0 E.U./dL   Blood: x / Protein: NEGATIVE mg/dL / Nitrite: NEGATIVE   Leuk Esterase: Small / RBC: 5-10 /HPF / WBC > 10 /HPF   Sq Epi: x / Non Sq Epi: 0-5 /HPF / Bacteria: Present /HPF          Radiology: Reviewed CCU Progress Note  Jon Dontrell, PGY-1  Pager: [phone]    Patient is a 41y old  Male who presents with a chief complaint of acute hypoxic respiratory failure    OVERNIGHT EVENTS/INTERVAL HPI: As per night team, remained on BIPAP overnight. Patient seen and examined at bedside. Notes that his SOB has improved on BIPAP. Offers no new complaints at this time. Patient currently denies fevers, sweats, chills, dyspnea, chest pain, nausea/vomiting, abdominal pain, dysuria.    REVIEW OF SYSTEMS:  All other review of systems is negative unless indicated above.    OBJECTIVE:  T(C): 36.8 (22 @ 06:28), Max: 39.2 (22 @ 23:53)  HR: 88 (22 @ 07:00) (88 - 132)  BP: 100/65 (22 @ 07:00) (95/70 - 147/65)  RR: 30 (22 @ 07:00) (25 - 60)  SpO2: 97% (22 @ 07:00) (90% - 99%)  Daily Height in cm: 187.96 (2022 01:59)    Daily     Physical Exam:  General: in no acute distress, resting comfortably on BIPAP  Eyes: EOMI intact bilaterally. Anicteric sclerae, moist conjunctivae. Yellow crusting on left lower eyelid. No ptosis.  HENT: Moist mucous membranes. Poor dentition.  Neck: Trachea midline, supple  Lungs: Equal breath sounds B/L. No wheezes or ronchi. Bibasilar rales. No accessory muscle use.  Cardiovascular: RRR. No murmurs, rubs, or gallops  Abdomen: Obese abdomen. Soft, non-tender non-distended; No rebound or guarding. Ostomy bag in place C/D/I without surrounding signs of infection.  Extremities: WWP, No clubbing, cyanosis. 2+ pitting edema. No calf tenderness bilaterally.  MSK: No midline bony tenderness. No CVA tenderness bilaterally.  Neurological: Alert and oriented x3  Skin: Warm and dry. No obvious rash. Venous stasis changes on b/l LE.    Medications:  MEDICATIONS  (STANDING):  atorvastatin 80 milliGRAM(s) Oral at bedtime  bictegravir 50 mG/emtricitabine 200 mG/tenofovir alafenamide 25 mG (BIKTARVY) 1 Tablet(s) Oral daily  chlorhexidine 4% Liquid 1 Application(s) Topical <User Schedule>  clopidogrel Tablet 75 milliGRAM(s) Oral daily  dexAMETHasone     Tablet 6 milliGRAM(s) Oral daily  dextrose 5%. 1000 milliLiter(s) (50 mL/Hr) IV Continuous <Continuous>  dextrose 5%. 1000 milliLiter(s) (100 mL/Hr) IV Continuous <Continuous>  dextrose 50% Injectable 25 Gram(s) IV Push once  dextrose 50% Injectable 12.5 Gram(s) IV Push once  dextrose 50% Injectable 25 Gram(s) IV Push once  furosemide Infusion 5 mG/Hr (2.5 mL/Hr) IV Continuous <Continuous>  glucagon  Injectable 1 milliGRAM(s) IntraMuscular once  heparin  Infusion 1000 Unit(s)/Hr (10 mL/Hr) IV Continuous <Continuous>  insulin lispro (ADMELOG) corrective regimen sliding scale   SubCutaneous three times a day before meals  pantoprazole  Injectable 40 milliGRAM(s) IV Push every 24 hours  piperacillin/tazobactam IVPB.. 3.375 Gram(s) IV Intermittent every 6 hours    MEDICATIONS  (PRN):  dextrose Oral Gel 15 Gram(s) Oral once PRN Blood Glucose LESS THAN 70 milliGRAM(s)/deciliter      Labs:                        11.0   14.39 )-----------( 256      ( 2022 03:29 )             31.8     04-04    137  |  100  |  19  ----------------------------<  130<H>  4.4   |  23  |  1.76<H>    Ca    8.4      2022 03:29  Phos  3.4     04-04  Mg     1.8     04-04    TPro  7.1  /  Alb  3.1<L>  /  TBili  1.6<H>  /  DBili  x   /  AST  63<H>  /  ALT  63<H>  /  AlkPhos  71  04-04    PT/INR - ( 2022 03:29 )   PT: 21.8 sec;   INR: 1.82     PTT - ( 2022 03:29 )  PTT:32.3 sec    Urinalysis Basic - ( 2022 02:44 )    Color: Yellow / Appearance: Clear / S.020 / pH: x  Gluc: x / Ketone: NEGATIVE  / Bili: Negative / Urobili: 1.0 E.U./dL   Blood: x / Protein: NEGATIVE mg/dL / Nitrite: NEGATIVE   Leuk Esterase: Small / RBC: 5-10 /HPF / WBC > 10 /HPF   Sq Epi: x / Non Sq Epi: 0-5 /HPF / Bacteria: Present /HPF    Radiology: Reviewed

## 2022-04-04 NOTE — H&P ADULT - NSICDXPASTMEDICALHX_GEN_ALL_CORE_FT
PAST MEDICAL HISTORY:  Acute myocardial infarction     Acute on chronic systolic congestive heart failure     Colon cancer     HIV disease     Left heart thrombus on xarelto

## 2022-04-04 NOTE — PROGRESS NOTE ADULT - ASSESSMENT
41M PMH CAD, MI (Jan 2022, stent), Left heart thrombus (Xarelto) CHF, colon cancer (s/p resection 2016), HIV (Biktarvy), former smoker (1pack/week for 3 years) BIBEMS for worsening shortness of breath for two days i/s/o recent cold, admitted to CCU for acute hypoxic respiratory failure likely 2/2 PNA vs acute heart failure exacerbation.    NEURO  No active issues, AAOX3, neuro intact    #Depression  Home med Escitalopram, unclear if pt takes it regularly  - Collateral from pharmacy in AM      [facility]S    #Acute Decompensated Heart Failure  Known CHF. Non definitive home meds: metoprolol, lasix/ eplerenone, entresto, lisinopril? (GDMT) . Unknown baseline EF.   Diffuse pulmonary edema on XR, volume overloaded on exam with edema, S3, ProBNP 34,267. S/p lasix 80mg and NG drip in ED.  - EKG 4/4: Sinus tach. Nonspecific st changes. LAFB. Incomplete RBBB  - Troponin I 170, ProBNP 34,267  - F/u TTE complete AM  - Bedside EF appears 20-25 with RMA  - Discontinue NG gtt  - Continue Lasix gtt 5mg/hr with q12 CMP and repletion  - Strict Is & Os  - Hold entresto i/s/o KULWINDER  - Hold metoprolol  - Formal med rec in AM    #History of MI  Home asa and plavix, atorvastatin 80mg. MI in Jan 2022 at [facility], stent placed, stenosed.   -Continue as heparin, plavix  -F/u Lipid panel AM    #Left heart thrombus  Hx clot in left heart found around time of stent placement on New Years 2022. Home Xarelto recently switched from coumadin  - Heparin gtt     #R Pulmonary embolism   Hypoxia, tachycardia, tachypnea,  elevated D-Dimer, elevated pro-BNP, history of cancer  -Consider CT PE in AM       PULM  #Acute Hypoxic Respiratory Failure likely 2/2 CHF exacerbation vs PNA  SOB progressing for 2 days after cold-like illness found to be in AHRF requiring BiPap.  - CXR: bilateral hilar congestion and diffuse opacification. Enlarged cardiac sillouette. Hyperinflated lungs with flattened diaphragm and hx of smoking c/f COPD  Differentials include ARDS, flash pulmonary edema, CHF exacerbation, COVID, Pneumonia. COVID negative in ED however ARDS with decreased lymphocyte%. Low suspicion for PCP as patient compliant with Biktarvy, reports good management.  - Continue BiPap  - Stop NG drip  - Lasix gtt, monitor UOP  - ABG frequent  - ED ABG: Respiratory alkalosis  - F/u viral load  - Treat as PNA below    #Pneumonia  S/p CTX, decadron 10mg. BiPap in ED with improvement in saturation.  - Elevated Procal 5.27  -    - Obtain sputum culture if has any productive cough  -f/u RVP, Covid and MRSA swab, fungitell, ESR, urine strep & legionella   - Start Vanc, Zosyn  - Continue decadon 6mg x 9 days  - If CD4<200 start PCP treatment  - See above      GI    #Transaminitis  May be i/s/o acute HF exacerbation with poor perfusion (transaminits, lactate) or hepatitis i/s/o immunocompromise from HIV. Denied etoh.  - Trend CMP  - F/u hepatitis panel    #Colon Cancer s/p Resection  Resection in 2016. Colostomy bag in place  - No acute interventions    RENAL    #KULWINDER vs kulwinder on CKD   Elevated Cr. Unknown baseline. Likely pre-renal as also with lactate, transaminitis in acute heart failure exacerbation  - F/u urine studies    HEME/ONC  No active issues    ENDO  -Start iSS since on decadron   -f/u A1c   -F/u lipid panel    ID  #Severe sepsis  Presented with lactate 3.8, tachypnea, leukocytosis with neutrophilic predominance and fever. Likely due to pna vs acute decompensated HF vs AHRF due to COVID. Less likely from UTI as no complaints of fever, dysuria.   - f/u blood culture, UCx  - Obtain sputum culture if productive cough develops  -f/u  RVP, Covid MRSA swab, ESR CRP, procal, repeat lactat  -s/p CTX 1gr in ED   - Start Vanc, Zosyn  - Continue decadron 6mg x 9 days    #HIV  Hx HIV. Takes Biktarvy regularly. States last viral load was wnl  -F/u viral load, CD4, hepatitis panel  - Continue Biktarvy  -Start PCP ppx if CD4<200    #UTI  Urine: leuk esterase, WBC>10, Bacteria. Denied dysuria. No suprapubic tenderness  - Continue Abx above  -F/u urine cx    MSK  No active issues    PROPHYLAXIS  Fluids: None  Electrolytes: replete as necessary, K>4, Mg>2  Nutrition: NPO since on BIPAP --> DASH  Bowel Regimen: None  DVT ppx: Heparin gtt  GI ppx: Pantoprazole 40mg IV     ACCESS  Code: Full  Disposition: CCU   41M PMH CAD, MI (Jan 2022, stent), Left heart thrombus (Xarelto) CHF, colon cancer (s/p resection 2016), HIV (Biktarvy), former smoker (1pack/week for 3 years) BIBEMS for worsening shortness of breath for two days i/s/o recent cold, admitted to CCU for acute hypoxic respiratory failure likely 2/2 PNA vs acute heart failure exacerbation.    NEURO  No active issues, AAOX3, neuro intact    #Depression  Home med Escitalopram, unclear if pt takes it regularly  - Collateral from pharmacy in AM    CVS    #Acute Decompensated Heart Failure  Known CHF. Non definitive home meds: metoprolol, lasix/ eplerenone, entresto, lisinopril? (GDMT) . Unknown baseline EF.   Diffuse pulmonary edema on XR, volume overloaded on exam with edema, S3, ProBNP 34,267. S/p lasix 80mg and NG drip in ED.  - EKG 4/4: Sinus tach. Nonspecific st changes. LAFB. Incomplete RBBB  - Troponin I 170, ProBNP 34,267  - F/u TTE complete AM  - Bedside EF appears 20-25 with RMA  - Discontinue NG gtt  - Continue Lasix gtt 5mg/hr with q12 CMP and repletion  - Strict Is & Os  - Hold entresto i/s/o KULWINDER  - Hold metoprolol  - Formal med rec in AM    #History of MI  Home asa and plavix, atorvastatin 80mg. MI in Jan 2022 at [facility], stent placed, stenosed.   -Continue as heparin, plavix  -F/u Lipid panel AM    #Left heart thrombus  Hx clot in left heart found around time of stent placement on New Years 2022. Home Xarelto recently switched from coumadin  - Heparin gtt     #R Pulmonary embolism   Hypoxia, tachycardia, tachypnea,  elevated D-Dimer, elevated pro-BNP, history of cancer  -Consider CT PE in AM       PULM  #Acute Hypoxic Respiratory Failure likely 2/2 CHF exacerbation vs PNA  SOB progressing for 2 days after cold-like illness found to be in AHRF requiring BiPap.  - CXR: bilateral hilar congestion and diffuse opacification. Enlarged cardiac sillouette. Hyperinflated lungs with flattened diaphragm and hx of smoking c/f COPD  Differentials include ARDS, flash pulmonary edema, CHF exacerbation, COVID, Pneumonia. COVID negative in ED however ARDS with decreased lymphocyte%. Low suspicion for PCP as patient compliant with Biktarvy, reports good management.  - Continue BiPap  - Stop NG drip  - Lasix gtt, monitor UOP  - ABG frequent  - ED ABG: Respiratory alkalosis  - F/u viral load  - Treat as PNA below    #Pneumonia  S/p CTX, decadron 10mg. BiPap in ED with improvement in saturation.  - Elevated Procal 5.27  -    - Obtain sputum culture if has any productive cough  -f/u RVP, Covid and MRSA swab, fungitell, ESR, urine strep & legionella   - Start Vanc, Zosyn  - Continue decadon 6mg x 9 days  - If CD4<200 start PCP treatment  - See above      GI    #Transaminitis  May be i/s/o acute HF exacerbation with poor perfusion (transaminits, lactate) or hepatitis i/s/o immunocompromise from HIV. Denied EtOH. Hepatitis panel wnl.  - Trend CMP    #Colon Cancer s/p Resection  Resection in 2016. Colostomy bag in place. Follows with Dr. Guilherme Travis with [facility]. No active disease.  - outpatient follow-up    RENAL    #KULWINDER vs kulwinder on CKD  Elevated Cr. Unknown baseline. Likely pre-renal as also with lactate, transaminitis in acute heart failure exacerbation. FeUrea pre-renal.  - continue to trend creatinine  - avoid nephrotoxic medications    HEME  #Leukocytosis  WBC 13.87 on admission and febrile to 102.5. Currently on decadron. Procal 5.2. Possible PNA.  - continue to trend    ENDO  -Start iSS since on decadron   -f/u A1c     ID  #Severe sepsis  Presented with lactate 3.8, tachypnea, leukocytosis with neutrophilic predominance and fever. Likely due to PNA vs acute decompensated HF vs AHRF due to COVID. Less likely from UTI as denies urinary symptoms.   - s/p CTX 1gr in ED   - Start Vanc, Zosyn  - c/w decadron 6mg x 9 days  - f/u blood culture, UCx  - f/u RVP, COVID, MRSA swab, ESR CRP, procal, repeat lactat  - Obtain sputum cx if productive cough develops      #HIV  Hx HIV (dx 2010). Compliant on Biktarvy. States last viral load was wnl.  - c/w Biktarvy  - f/u viral load, CD4        - start PCP ppx if CD4<200    #Asymptomatic bacteriuria  Urine: leuk esterase, WBC>10, Bacteria. Denied dysuria. No suprapubic/CVA tenderness.  - on vanc/zosyn, as above  - f/u urine cx results    MSK  No active issues    PROPHYLAXIS  Fluids: None  Electrolytes: replete as necessary, K>4, Mg>2  Nutrition: NPO since on BIPAP --> DASH  Bowel Regimen: None  DVT ppx: Heparin gtt  GI ppx: Pantoprazole 40mg IV   Code: Full  Disposition: CCU   41M PMH CAD, MI (Jan 2022, stent), Left heart thrombus (Xarelto) CHF, colon cancer (s/p resection 2016), HIV (Biktarvy), former smoker (1pack/week for 3 years) BIBEMS for worsening shortness of breath for two days i/s/o recent cold, admitted to CCU for acute hypoxic respiratory failure likely 2/2 PNA vs acute heart failure exacerbation.    NEURO  No active issues, AAOX3, neuro intact    #Depression  Home med Escitalopram, unclear if pt takes it regularly  - Collateral from pharmacy in AM    CVS    #Acute Decompensated Heart Failure  Known CHF. Unknown baseline EF. Home meds: Entresto, torsemide, Toprol XL  Diffuse pulmonary edema on XR, volume overloaded on exam with edema, S3, ProBNP 34,267. S/p lasix 80mg and NG drip in ED.  EKG 4/4: Sinus tach. Nonspecific st changes. LAFB. Incomplete RBBB  Troponin I 170, ProBNP 34,267  Bedside EF appears 20-25 with RMA  - c/w Lasix gtt @5mg/hr with q12 CMP and repletion  - f/u TTE complete  - Strict I&Os  - Hold Entresto i/s/o KULWINDER  - Hold metoprolol  - Formal med rec in AM    #History of MI  Home asa and plavix, atorvastatin 80mg. MI in Jan 2022 at [facility], stent placed, stenosed.   -Continue as heparin, plavix  -F/u Lipid panel AM    #Left heart thrombus  Hx clot in left heart found around time of stent placement on New Years 2022. Home Xarelto recently switched from coumadin  - Heparin gtt     #R Pulmonary embolism   Hypoxia, tachycardia, tachypnea,  elevated D-Dimer, elevated pro-BNP, history of cancer  -Consider CT PE in AM       PULM  #Acute Hypoxic Respiratory Failure likely 2/2 CHF exacerbation vs PNA  SOB progressing for 2 days after cold-like illness found to be in AHRF requiring BiPap.  - CXR: bilateral hilar congestion and diffuse opacification. Enlarged cardiac sillouette. Hyperinflated lungs with flattened diaphragm and hx of smoking c/f COPD  Differentials include ARDS, flash pulmonary edema, CHF exacerbation, COVID, Pneumonia. COVID negative in ED however ARDS with decreased lymphocyte%. Low suspicion for PCP as patient compliant with Biktarvy, reports good management.  - Continue BiPap  - Stop NG drip  - Lasix gtt, monitor UOP  - ABG frequent  - ED ABG: Respiratory alkalosis  - F/u viral load  - Treat as PNA below    #Pneumonia  S/p CTX, decadron 10mg. BiPap in ED with improvement in saturation.  - Elevated Procal 5.27  -    - Obtain sputum culture if has any productive cough  -f/u RVP, Covid and MRSA swab, fungitell, ESR, urine strep & legionella   - Start Vanc, Zosyn  - Continue decadon 6mg x 9 days  - If CD4<200 start PCP treatment  - See above      GI    #Transaminitis  May be i/s/o acute HF exacerbation with poor perfusion (transaminits, lactate) or hepatitis i/s/o immunocompromise from HIV. Denied EtOH. Hepatitis panel wnl.  - Trend CMP    #Colon Cancer s/p Resection  Resection in 2016. Colostomy bag in place. Follows with Dr. Guilherme Travis with [facility]. No active disease.  - outpatient follow-up    RENAL    #KULWINDER vs kulwinder on CKD  Elevated Cr. Unknown baseline. Likely pre-renal as also with lactate, transaminitis in acute heart failure exacerbation. FeUrea pre-renal.  - continue to trend creatinine  - avoid nephrotoxic medications    HEME  #Leukocytosis  WBC 13.87 on admission and febrile to 102.5. Currently on decadron. Procal 5.2. Possible PNA.  - continue to trend    ENDO  -Start iSS since on decadron   -f/u A1c     ID  #Severe sepsis  Presented with lactate 3.8, tachypnea, leukocytosis with neutrophilic predominance and fever. Likely due to PNA vs acute decompensated HF vs AHRF due to COVID. Less likely from UTI as denies urinary symptoms.   - s/p CTX 1gr in ED   - Start Vanc, Zosyn  - c/w decadron 6mg x 9 days  - f/u blood culture, UCx  - f/u RVP, COVID, MRSA swab, ESR CRP, procal, repeat lactat  - Obtain sputum cx if productive cough develops      #HIV  Hx HIV (dx 2010). Compliant on Biktarvy. States last viral load was wnl.  - c/w Biktarvy  - f/u viral load, CD4        - start PCP ppx if CD4<200    #Asymptomatic bacteriuria  Urine: leuk esterase, WBC>10, Bacteria. Denied dysuria. No suprapubic/CVA tenderness.  - on vanc/zosyn, as above  - f/u urine cx results    MSK  No active issues    PROPHYLAXIS  Fluids: None  Electrolytes: replete as necessary, K>4, Mg>2  Nutrition: NPO since on BIPAP --> DASH  Bowel Regimen: None  DVT ppx: Heparin gtt  GI ppx: Pantoprazole 40mg IV   Code: Full  Disposition: CCU   41M PMH CAD, MI (Jan 2022, stent), Left heart thrombus (Xarelto) CHF, colon cancer (s/p resection 2016), HIV (Biktarvy), former smoker (1pack/week for 3 years) BIBEMS for worsening shortness of breath for two days i/s/o recent cold, admitted to CCU for acute hypoxic respiratory failure likely 2/2 PNA vs acute heart failure exacerbation.    NEURO  No active issues, AAOX3, neuro intact    #Depression  Home med Escitalopram, unclear if pt takes it regularly  - Collateral from pharmacy in AM    CVS    #Acute Decompensated Heart Failure  Known CHF. Unknown baseline EF. Home meds: Entresto, torsemide, Toprol XL  Diffuse pulmonary edema on XR, volume overloaded on exam with edema, S3, ProBNP 34,267. S/p lasix 80mg and NG drip in ED.  EKG 4/4: Sinus tach. Nonspecific st changes. LAFB. Incomplete RBBB  Troponin I 170, ProBNP 34,267  Bedside EF appears 20-25 with RMA  - c/w Lasix gtt @5mg/hr with q12 CMP and repletion  - f/u TTE complete with contrast  - Strict I&Os  - Hold Entresto i/s/o KULWINDER  - Hold metoprolol    #History of MI  Home asa and plavix, atorvastatin 80mg. MI in Jan 2022 at [facility], stent placed, stenosed.   - c/w heparin gtt  - c/w plavix 75mg daily  - f/u Lipid panel    #Left heart thrombus  Hx clot in left heart found around time of stent placement on New Years 2022. Home Xarelto recently switched from coumadin  - c/w heparin gtt   - f/u TTE complete with contrast    #R Pulmonary embolism   Hypoxia, tachycardia, tachypnea,  elevated D-Dimer, elevated pro-BNP, history of cancer  - f/u BL LE ultrasound r/o DVT      PULM  #Acute Hypoxic Respiratory Failure likely 2/2 CHF exacerbation vs PNA  SOB progressing for 2 days after cold-like illness found to be in AHRF requiring BiPap.  - CXR: bilateral hilar congestion and diffuse opacification. Enlarged cardiac sillouette. Hyperinflated lungs with flattened diaphragm and hx of smoking c/f COPD  Differentials include ARDS, flash pulmonary edema, CHF exacerbation, COVID, Pneumonia. COVID negative in ED however ARDS with decreased lymphocyte%. Low suspicion for PCP as patient compliant with Biktarvy, reports good management.  - discontinue BIPAP, transition to HFNC  - Lasix gtt, monitor UOP  - F/u viral load  - Treat as PNA below    #Pneumonia  S/p CTX, decadron 10mg. BiPap in ED with improvement in saturation. MRSA nares negative.  Elevated Procal 5.27,    - Obtain sputum culture if has any productive cough  - f/u RVP, Covid, fungitell, ESR, urine strep & legionella   - STOP vancomycin  - c/w zosyn 3.375g q6h  - STOP decadron and monitor clinical status on diuretics  - If CD4<200 start PCP treatment      GI    #Transaminitis  May be i/s/o acute HF exacerbation with poor perfusion (transaminits, lactate) or hepatitis i/s/o immunocompromise from HIV. Denied EtOH. Hepatitis panel wnl.  - Trend CMP    #Colon Cancer s/p Resection  Resection in 2016. Colostomy bag in place. Follows with Dr. Guilherme Travis with [facility]. No active disease.  - outpatient follow-up    RENAL    #KULWINDER vs kulwinder on CKD  Elevated Cr. Unknown baseline. Likely pre-renal as also with lactate, transaminitis in acute heart failure exacerbation. FeUrea pre-renal.  - continue to trend creatinine  - avoid nephrotoxic medications    HEME  #Leukocytosis  WBC 13.87 on admission and febrile to 102.5. Currently on decadron. Procal 5.2. Possible PNA.  - continue to trend    ENDO  A1c     ID  #Severe sepsis  Presented with lactate 3.8, tachypnea, leukocytosis with neutrophilic predominance and fever. Likely due to PNA vs acute decompensated HF vs AHRF due to COVID. Less likely from UTI as denies urinary symptoms.   - s/p CTX 1gr in ED   - Start Vanc, Zosyn  - c/w decadron 6mg x 9 days  - f/u blood culture, UCx  - f/u RVP, COVID, MRSA swab, ESR CRP, procal, repeat lactat  - Obtain sputum cx if productive cough develops      #HIV  Hx HIV (dx 2010). Compliant on Biktarvy. States last viral load was wnl.  - c/w Biktarvy  - f/u viral load, CD4        - start PCP ppx if CD4<200    #Asymptomatic bacteriuria  Urine: leuk esterase, WBC>10, Bacteria. Denied dysuria. No suprapubic/CVA tenderness.  - on vanc/zosyn, as above  - f/u urine cx results    MSK  No active issues    PROPHYLAXIS  Fluids: None  Electrolytes: replete as necessary, K>4, Mg>2  Nutrition: NPO since on BIPAP --> DASH  Bowel Regimen: None  DVT ppx: Heparin gtt  GI ppx: Pantoprazole 40mg IV   Code: Full  Disposition: CCU   41M PMH CAD, MI (Jan 2022, stent), Left heart thrombus (Xarelto) CHF, colon cancer (s/p resection 2016), HIV (Biktarvy), former smoker (1pack/week for 3 years) BIBEMS for worsening shortness of breath for two days i/s/o recent cold, admitted to CCU for acute hypoxic respiratory failure likely 2/2 PNA vs acute heart failure exacerbation.    NEURO  No active issues, AAOX3, neuro intact    #Depression  Home med Escitalopram, unclear if pt takes it regularly  - pending formal med rec    CVS    #Acute Decompensated Heart Failure  Known CHF. Unknown baseline EF. Home meds: Entresto, torsemide, Toprol XL  Diffuse pulmonary edema on XR, volume overloaded on exam with edema, S3, ProBNP 34,267. S/p lasix 80mg and NG drip in ED.  EKG 4/4: Sinus tach. Nonspecific st changes. LAFB. Incomplete RBBB  Troponin I 170, ProBNP 34,267  Bedside EF appears 20-25 with RMA  - c/w Lasix gtt @5mg/hr with q12 CMP and repletion  - f/u TTE complete with contrast  - strict I&Os  - holding Entresto i/s/o KULWINDER  - holding metoprolol for now, will consider reintroducing lopressor 12.5 BID  - pending formal med rec    #History of MI  Home asa and plavix, atorvastatin 80mg. MI in Jan 2022 at [facility], stent placed, stenosed.   - c/w heparin gtt  - c/w plavix 75mg daily  - f/u Lipid panel    #Left heart thrombus  Hx clot in left heart found around time of stent placement on New Years 2022. Home Xarelto recently switched from coumadin  - c/w heparin gtt   - f/u TTE complete with contrast    #R Pulmonary embolism   Hypoxia, tachycardia, tachypnea,  elevated D-Dimer, elevated pro-BNP, history of cancer  - f/u BL LE ultrasound r/o DVT      PULM  #Acute Hypoxic Respiratory Failure likely 2/2 CHF exacerbation vs PNA  SOB progressing for 2 days after cold-like illness found to be in AHRF requiring BiPap.  - CXR: bilateral hilar congestion and diffuse opacification. Enlarged cardiac sillouette. Hyperinflated lungs with flattened diaphragm and hx of smoking c/f COPD  Differentials include ARDS, flash pulmonary edema, CHF exacerbation, COVID, Pneumonia. COVID negative in ED however ARDS with decreased lymphocyte%. Low suspicion for PCP as patient compliant with Biktarvy, reports good management.  - discontinue BIPAP, transition to HFNC  - c/w Lasix gtt, monitor UOP and CXR  - f/u HIV viral load, CD4  - PNA plan, as below    #Pneumonia  S/p CTX, decadron 10mg. BiPap in ED with improvement in saturation. MRSA nares negative.  Elevated Procal 5.27,    - STOP decadron and monitor clinical status on diuretics  - STOP vancomycin  - c/w zosyn 3.375g q6h  - f/u HIV viral load and CD4        - If CD4<200, start PCP treatment  - obtain sputum cx if produces sputum  - f/u RVP, Covid, fungitell, ESR, urine strep & legionella       GI    #Transaminitis  May be i/s/o acute HF exacerbation with poor perfusion (transaminits, lactate) or hepatitis i/s/o immunocompromise from HIV. Denied EtOH. Hepatitis panel wnl.  - Trend CMP    #Colon Cancer s/p Resection  Resection in 2016. Colostomy bag in place. Follows with Dr. Guilherme Travis with [facility]. No active disease.  - outpatient follow-up    RENAL    #KULWINDER vs kulwinder on CKD  Elevated Cr. Unknown baseline. Likely pre-renal as also with lactate, transaminitis in acute heart failure exacerbation. FeUrea pre-renal.  - continue to trend creatinine  - avoid nephrotoxic medications    HEME  #Leukocytosis  WBC 13.87 on admission and febrile to 102.5. Currently on decadron. Procal 5.2. Possible PNA.  - continue to trend    ENDO  A1c 5.2, no PMH of diabetes  - low ISS  - holding decadron, as above  - FSG before meals and at bedtime    ID  #Severe sepsis  Presented with lactate 3.8, tachypnea, leukocytosis with neutrophilic predominance and fever. Likely due to PNA vs acute decompensated HF vs AHRF due to COVID. Less likely from UTI as denies urinary symptoms.   s/p CTX 1gr in ED. MRSA nares negative, discontinuing vancomycin  - holding decadron, will monitor clinical status on diuretics alone  - c/w zosyn 3.375g q6h  - f/u blood culture, UCx  - f/u RVP, COVID, MRSA swab, ESR CRP, procal, repeat lactat  - obtain sputum cx if produces sputum      #HIV  Hx HIV (dx 2010). Compliant on Biktarvy. States last viral load was wnl.  - c/w Biktarvy  - f/u viral load, CD4        - start PCP ppx if CD4<200    #Asymptomatic bacteriuria  Urine: leuk esterase, WBC>10, Bacteria. Denied dysuria. No suprapubic/CVA tenderness.  - on abx for PNA, as above  - f/u urine cx results    MSK  No active issues    PROPHYLAXIS  Fluids: None  Electrolytes: replete as necessary, K>4, Mg>2  Nutrition: NPO since on BIPAP --> DASH  Bowel Regimen: None  DVT ppx: Heparin gtt  GI ppx: Pantoprazole 40mg IV   Code: Full  Disposition: CCU   41M PMH CAD, MI (Jan 2022, stent), Left heart thrombus (Xarelto) CHF, colon cancer (s/p resection 2016), HIV (Biktarvy), former smoker (1pack/week for 3 years) BIBEMS for worsening shortness of breath for two days i/s/o recent cold, admitted to CCU for acute hypoxic respiratory failure likely 2/2 PNA vs acute heart failure exacerbation.    NEURO  No active issues, AAOX3, neuro intact    #Depression  Home med Escitalopram, unclear if pt takes it regularly  - pending formal med rec    CVS  #Acute Decompensated Heart Failure  Known CHF. Unknown baseline EF. Home meds: Entresto, torsemide, Toprol XL  Diffuse pulmonary edema on XR, volume overloaded on exam with edema, S3, ProBNP 34,267. S/p lasix 80mg and NG drip in ED.  EKG 4/4: Sinus tach. Nonspecific st changes. LAFB. Incomplete RBBB  Troponin I 170, ProBNP 34,267  Bedside EF appears 20-25 with RMA  - c/w Lasix gtt @5mg/hr with q12 CMP and repletion  - f/u TTE complete with contrast  - strict I&Os  - holding Entresto i/s/o KULWINDER  - holding metoprolol for now, will consider reintroducing lopressor 12.5 BID  - pending formal med rec    #History of MI  Home asa and plavix, atorvastatin 80mg. MI in Jan 2022 at [facility], stent placed, stenosed.   - c/w heparin gtt  - c/w plavix 75mg daily  - f/u Lipid panel    #Left heart thrombus  Hx clot in left heart found around time of stent placement on New Years 2022. Home Xarelto recently switched from coumadin  - c/w heparin gtt   - f/u TTE complete with contrast    #R Pulmonary embolism   Hypoxia, tachycardia, tachypnea,  elevated D-Dimer, elevated pro-BNP, history of cancer  - f/u BL LE ultrasound r/o DVT    PULM  #Acute Hypoxic Respiratory Failure likely 2/2 CHF exacerbation vs PNA  SOB progressing for 2 days after cold-like illness found to be in AHRF requiring BiPap.  - CXR: bilateral hilar congestion and diffuse opacification. Enlarged cardiac sillouette. Hyperinflated lungs with flattened diaphragm and hx of smoking c/f COPD  Differentials include ARDS, flash pulmonary edema, CHF exacerbation, COVID, Pneumonia. COVID negative in ED however ARDS with decreased lymphocyte%. Low suspicion for PCP as patient compliant with Biktarvy, reports good management.  - discontinue BIPAP, transition to HFNC  - c/w Lasix gtt, monitor UOP and CXR  - f/u HIV viral load, CD4  - PNA plan, as below    #Pneumonia  S/p CTX, decadron 10mg. BiPap in ED with improvement in saturation. MRSA nares negative.  Elevated Procal 5.27,    - STOP decadron and monitor clinical status on diuretics  - STOP vancomycin  - c/w zosyn 3.375g q6h  - f/u HIV viral load and CD4        - If CD4<200, start PCP treatment  - obtain sputum cx if produces sputum  - f/u RVP, Covid, fungitell, ESR, urine strep & legionella     GI  #Transaminitis  May be i/s/o acute HF exacerbation with poor perfusion (transaminits, lactate) or hepatitis i/s/o immunocompromise from HIV. Denied EtOH. Hepatitis panel wnl.  - Trend CMP    #Colon Cancer s/p Resection  Resection in 2016. Colostomy bag in place. Follows with Dr. Guilherme Travis with [facility]. No active disease.  - outpatient follow-up    RENAL  #KULWINDER vs kulwinder on CKD  Elevated Cr. Unknown baseline. Likely pre-renal as also with lactate, transaminitis in acute heart failure exacerbation. FeUrea pre-renal.  - continue to trend creatinine  - avoid nephrotoxic medications    HEME  #Leukocytosis  WBC 13.87 on admission and febrile to 102.5. Currently on decadron. Procal 5.2. Possible PNA.  - continue to trend    ENDO  A1c 5.2, no PMH of diabetes  - low ISS  - holding decadron, as above  - FSG before meals and at bedtime    ID  #Severe sepsis  Presented with lactate 3.8, tachypnea, leukocytosis with neutrophilic predominance and fever. Likely due to PNA vs acute decompensated HF vs AHRF due to COVID. Less likely from UTI as denies urinary symptoms.   s/p CTX 1gr in ED. MRSA nares negative, discontinuing vancomycin  - STOP vancomycin  - c/w zosyn 3.375g q6h  - f/u blood culture, UCx  - f/u RVP, COVID swab  - obtain sputum cx if produces sputum    #HIV  Hx HIV (dx 2010). Compliant on Biktarvy. States last viral load was wnl.  - c/w Biktarvy  - f/u viral load, CD4        - start PCP ppx if CD4<200    #Asymptomatic bacteriuria  Denies dysuria, urinary frequency/urgency. No suprapubic/CVA tenderness. Urine: small LE, neg nitrites. WBC>10.   - on abx for PNA, as above  - f/u urine cx results    MSK  No active issues    PROPHYLAXIS  Fluids: None  Electrolytes: replete as necessary, K>4, Mg>2  Nutrition: DASH  Bowel Regimen: None  DVT ppx: Heparin gtt  GI ppx: Pantoprazole 40mg IV   Code: Full  Disposition: CCU   41M PMH CAD, MI (Jan 2022, stent), Left heart thrombus (Xarelto) CHF, colon cancer (s/p resection 2016), HIV (Biktarvy), former smoker (1pack/week for 3 years) BIBEMS for worsening shortness of breath for two days i/s/o recent cold, admitted to CCU for acute hypoxic respiratory failure likely 2/2 PNA vs acute heart failure exacerbation.    NEURO  No active issues, AAOX3, neuro intact    #Depression  Home med Escitalopram, unclear if pt takes it regularly  - pending formal med rec    CVS  #Acute Decompensated Heart Failure  Known CHF. Unknown baseline EF. Home meds: Entresto, torsemide, Toprol XL  Diffuse pulmonary edema on XR, volume overloaded on exam with edema, S3, ProBNP 34,267. S/p lasix 80mg and NG drip in ED.  EKG 4/4: Sinus tach. Nonspecific st changes. LAFB. Incomplete RBBB  Troponin I 170, ProBNP 34,267  Bedside EF appears 20-25 with RMA  - c/w Lasix gtt @5mg/hr with q12 CMP and repletion  - f/u TTE complete with contrast  - strict I&Os  - holding Entresto i/s/o KULWINDER  - holding metoprolol for now, will consider reintroducing lopressor 12.5 BID  - pending formal med rec    #History of MI  Home asa and plavix, atorvastatin 80mg. MI in Jan 2022 at [facility], stent placed, stenosed.   - c/w heparin gtt  - c/w plavix 75mg daily  - f/u Lipid panel    #Left heart thrombus  Hx clot in left heart found around time of stent placement on New Years 2022. Home Xarelto recently switched from coumadin  - c/w heparin gtt   - f/u TTE complete with contrast    #R Pulmonary embolism   Hypoxia, tachycardia, tachypnea,  elevated D-Dimer, elevated pro-BNP, history of cancer  - f/u BL LE ultrasound r/o DVT    PULM  #Acute Hypoxic Respiratory Failure likely 2/2 CHF exacerbation vs PNA  SOB progressing for 2 days after cold-like illness found to be in AHRF requiring BiPap.  - CXR: bilateral hilar congestion and diffuse opacification. Enlarged cardiac sillouette. Hyperinflated lungs with flattened diaphragm and hx of smoking c/f COPD  Differentials include ARDS, flash pulmonary edema, CHF exacerbation, COVID, Pneumonia. COVID negative in ED however ARDS with decreased lymphocyte%. Low suspicion for PCP as patient compliant with Biktarvy, reports good management.  - discontinue BIPAP, transition to HFNC  - STOP decadron and monitor clinical status on diuretics  - c/w Lasix gtt, monitor UOP and CXR  - STOP vancomycin  - c/w zosyn 3.375g q6h  - f/u HIV viral load and CD4        - If CD4<200, start PCP treatment  - obtain sputum cx if produces sputum  - f/u RVP, Covid, fungitell, urine strep & legionella    #Pneumonia  S/p CTX, decadron 10mg. BIPAP in ED with improvement in SpO2. MRSA nares negative.  Elevated Procal 5.27,    - acute hypoxic respiratory failure plan, as above    GI  #Transaminitis  May be i/s/o acute HF exacerbation with poor perfusion (transaminits, lactate) or hepatitis i/s/o immunocompromise from HIV. Denied EtOH. Hepatitis panel wnl.  - Trend CMP    #Colon Cancer s/p Resection  Resection in 2016. Colostomy bag in place. Follows with Dr. Guilherme Travis with [facility]. No active disease.  - outpatient follow-up    RENAL  #KULWINDER vs kulwinder on CKD  Elevated Cr. Unknown baseline. Likely pre-renal as also with lactate, transaminitis in acute heart failure exacerbation. FeUrea pre-renal.  - continue to trend creatinine  - avoid nephrotoxic medications    HEME  #Leukocytosis  WBC 13.87 on admission and febrile to 102.5. Procal 5.2. Being treated for possible CAP vs. PCP PNA. Previously on decadron earlier in course.  - continue to trend    ENDO  A1c 5.2, no PMH of diabetes  - low ISS  - holding decadron, as above  - FSG before meals and at bedtime    ID  #Severe sepsis  Presented with lactate 3.8, tachypnea, leukocytosis with neutrophilic predominance and fever. Likely due to PNA vs acute decompensated HF vs AHRF due to COVID. Less likely from UTI as denies urinary symptoms.   s/p CTX 1gr in ED. MRSA nares negative, discontinuing vancomycin  - STOP vancomycin  - c/w zosyn 3.375g q6h  - f/u blood culture, UCx  - f/u RVP, COVID swab  - obtain sputum cx if produces sputum    #HIV  Hx HIV (dx 2010). Compliant on Biktarvy. States last viral load was wnl.  - c/w Biktarvy  - f/u viral load, CD4        - start PCP ppx if CD4<200    #Asymptomatic bacteriuria  Denies dysuria, urinary frequency/urgency. No suprapubic/CVA tenderness. Urine: small LE, neg nitrites. WBC>10.   - on abx for PNA, as above  - f/u urine cx results    MSK  No active issues    PROPHYLAXIS  Fluids: None  Electrolytes: replete as necessary, K>4, Mg>2  Nutrition: DASH  Bowel Regimen: None  DVT ppx: Heparin gtt  GI ppx: Pantoprazole 40mg IV   Code: Full  Disposition: CCU   41M PMH CAD, MI (Jan 2022, stent), Left heart thrombus (Xarelto) CHF, colon cancer (s/p resection 2016), HIV (Biktarvy), former smoker (1pack/week for 3 years) BIBEMS for worsening shortness of breath for two days i/s/o recent cold, admitted to CCU for acute hypoxic respiratory failure likely 2/2 PNA vs acute heart failure exacerbation.    NEURO  No active issues, AAOX3, neuro intact    #Depression  Home med Escitalopram, unclear if pt takes it regularly  - pending formal med rec    CVS  #Acute Decompensated Heart Failure  Known CHF. Unknown baseline EF. Home meds: Entresto, torsemide, Toprol XL  Diffuse pulmonary edema on XR, volume overloaded on exam with edema, S3, ProBNP 34,267. S/p lasix 80mg and NG drip in ED.  EKG 4/4: Sinus tach. Nonspecific st changes. LAFB. Incomplete RBBB  Troponin I 170, ProBNP 34,267  Bedside EF appears 20-25 with RMA  - c/w Lasix gtt @5mg/hr with q12 CMP and repletion  - f/u TTE complete with contrast  - strict I&Os  - holding Entresto i/s/o KULWINDER  - holding metoprolol for now, will consider reintroducing lopressor 12.5 BID  - pending formal med rec    #History of MI  Home asa and plavix, atorvastatin 80mg. MI in Jan 2022 at [facility], stent placed, stenosed. Lipid panel unremarkable.  - c/w heparin gtt  - c/w plavix 75mg daily  - obtain collateral from cardiologist at [facility]    #Left heart thrombus  Hx clot in left heart found around time of stent placement on New Years 2022. Home Xarelto recently switched from coumadin  - c/w heparin gtt   - f/u TTE complete with contrast    #R Pulmonary embolism   Hypoxia, tachycardia, tachypnea,  elevated D-Dimer, elevated pro-BNP, history of cancer  - f/u BL LE ultrasound r/o DVT    PULM  #Acute Hypoxic Respiratory Failure likely 2/2 CHF exacerbation vs PNA  SOB progressing for 2 days after cold-like illness found to be in AHRF requiring BiPap.  - CXR: bilateral hilar congestion and diffuse opacification. Enlarged cardiac sillouette. Hyperinflated lungs with flattened diaphragm and hx of smoking c/f COPD  Differentials include ARDS, flash pulmonary edema, CHF exacerbation, COVID, Pneumonia. COVID negative in ED however ARDS with decreased lymphocyte%. Low suspicion for PCP as patient compliant with Biktarvy, reports good management.  - discontinue BIPAP, transition to HFNC  - STOP decadron and monitor clinical status on diuretics  - c/w Lasix gtt, monitor UOP and CXR  - STOP vancomycin  - c/w zosyn 3.375g q6h  - f/u HIV viral load and CD4        - If CD4<200, start PCP treatment  - obtain sputum cx if produces sputum  - f/u RVP, Covid, fungitell, urine strep & legionella    #Pneumonia  S/p CTX, decadron 10mg. BIPAP in ED with improvement in SpO2. MRSA nares negative.  Elevated Procal 5.27,    - acute hypoxic respiratory failure plan, as above    GI  #Transaminitis  May be i/s/o acute HF exacerbation with poor perfusion (transaminits, lactate) or hepatitis i/s/o immunocompromise from HIV. Denied EtOH. Hepatitis panel wnl.  - trend CMP    #Colon Cancer s/p Resection  Resection in 2016. Colostomy bag in place. Follows with Dr. Guilherme Travis with [facility]. No active disease.  - outpatient follow-up    RENAL  #KULWINDER vs kulwinder on CKD  Elevated Cr. Unknown baseline. Likely pre-renal as also with lactate, transaminitis in acute heart failure exacerbation. FeUrea pre-renal.  - continue to trend creatinine  - avoid nephrotoxic medications    HEME  #Leukocytosis  WBC 13.87 on admission and febrile to 102.5. Procal 5.2. Being treated for possible CAP vs. PCP PNA. Previously on decadron earlier in course.  - holding decadron, as above  - abx plan, as above  - continue to trend    ENDO  A1c 5.2, no PMH of diabetes  - low ISS  - holding decadron, as above  - FSG before meals and at bedtime    ID  #Severe sepsis  Presented with lactate 3.8, tachypnea, leukocytosis with neutrophilic predominance and fever. Likely due to PNA vs acute decompensated HF vs AHRF due to COVID. Less likely from UTI as denies urinary symptoms.   s/p CTX 1gr in ED. MRSA nares negative, discontinuing vancomycin  - STOP vancomycin  - c/w zosyn 3.375g q6h  - f/u blood culture, UCx  - f/u RVP, COVID swab  - obtain sputum cx if produces sputum    #HIV  Hx HIV (dx 2010). Compliant on Biktarvy. States last viral load was wnl.  - c/w Biktarvy  - f/u viral load, CD4        - start PCP ppx if CD4<200    #Asymptomatic bacteriuria  Denies dysuria, urinary frequency/urgency. No suprapubic/CVA tenderness. Urine: small LE, neg nitrites. WBC>10.   - on abx for PNA, as above  - f/u urine cx results    MSK  No active issues    PROPHYLAXIS  Fluids: None  Electrolytes: replete as necessary, K>4, Mg>2  Nutrition: DASH  Bowel Regimen: None  DVT ppx: Heparin gtt  GI ppx: Pantoprazole 40mg IV   Code: Full  Disposition: CCU   41M PMH CAD, MI (Jan 2022, stent), Left heart thrombus (Xarelto) CHF, colon cancer (s/p resection 2016), HIV (Biktarvy), former smoker (1pack/week for 3 years) BIBEMS for worsening shortness of breath for two days i/s/o recent cold, admitted to CCU for acute hypoxic respiratory failure likely 2/2 PNA vs acute heart failure exacerbation.    NEURO  No active issues, AAOX3, neuro intact    #Depression  Home med Escitalopram, unclear if pt takes it regularly  - pending formal med rec    CVS  #Acute Decompensated Heart Failure  Known CHF. Unknown baseline EF. Home meds: Entresto, torsemide, Toprol XL  Diffuse pulmonary edema on XR, volume overloaded on exam with edema, S3, ProBNP 34,267. S/p lasix 80mg and NG drip in ED.  EKG 4/4: Sinus tach. Nonspecific st changes. LAFB. Incomplete RBBB  Troponin I 170, ProBNP 34,267  Bedside EF appears 20-25 with RMA  - c/w Lasix gtt @5mg/hr with q12 CMP and repletion  - holding Entresto, reintroduce when BP stable  - holding Toprol, reintroduce Lopressor 12.5mg when euvolemic  - f/u TTE complete with contrast  - strict I&Os  - holding metoprolol for now, will consider reintroducing lopressor 12.5 BID    #CAD  #History of MI s/p PCI  Home asa and Plavix, atorvastatin 80mg. MI in Jan 2022 at [facility], stent placed, stenosed. Lipid panel unremarkable.  - c/w heparin gtt, likely transition back to Xarelto tomorrow  - c/w Plavix 75mg daily  - c/w atorvastatin 80mg daily  - obtain collateral from cardiologist at [facility]    #Left heart thrombus  Hx clot in left heart found around time of stent placement on New Years 2022. Home Xarelto recently switched from coumadin  - c/w heparin gtt, likely transition back to Xarelto tomorrow  - f/u TTE complete with contrast  - obtain collateral from cardiologist at [facility]    #R Pulmonary embolism   Hypoxia, tachycardia, tachypnea,  elevated D-Dimer, elevated pro-BNP, history of cancer  - f/u BL LE ultrasound r/o DVT    PULM  #Acute Hypoxic Respiratory Failure likely 2/2 CHF exacerbation vs PNA  SOB progressing for 2 days after cold-like illness found to be in AHRF requiring BIPAP.   Febrile on admission. Elevated LDH, d-dimer, LDH, procal, ESR, CRP.   COVID, MRSA nares, Ur legionella/strep neg.  CXR shows bilateral hilar congestion and diffuse bilateral opacification   Differentials include ARDS, flash pulmonary edema, CHF exacerbation, COVID, Pneumonia. COVID negative in ED however ARDS with decreased lymphocyte%.   Less suspicion for PCP as patient states compliant on Biktarvy.  - transition to HFNC from BIPAP  - STOP decadron and monitor clinical status on diuretics  - c/w Lasix gtt, monitor UOP and CXR  - STOP vancomycin  - c/w zosyn 3.375g q6h  - f/u HIV viral load and CD4        - If CD4<200, start PCP treatment  - obtain sputum cx if produces sputum  - f/u RVP, fungitell    #Pneumonia  S/p CTX, decadron 10mg. BIPAP in ED with improvement in SpO2.   COVID, MRSA nares, Ur legionella/strep neg.  Elevated Procal 5.27,    - acute hypoxic respiratory failure plan, as above    GI  #Transaminitis  May be i/s/o acute HF exacerbation with poor perfusion (transaminits, lactate) or hepatitis i/s/o immunocompromise from HIV. Denied EtOH. Hepatitis panel wnl.  - trend CMP    #Colon Cancer s/p Resection  Resection in 2016. Colostomy bag in place. Follows with Dr. Guilherme Travis with [facility]. No active disease.  - outpatient follow-up    RENAL  #KULWINDER vs kulwinder on CKD  Elevated Cr. Unknown baseline. Likely pre-renal as also with lactate, transaminitis in acute heart failure exacerbation. FeUrea pre-renal.  - continue to trend creatinine  - avoid nephrotoxic medications    HEME  #Leukocytosis  WBC 13.87 on admission and febrile to 102.5. Procal 5.2. Being treated for possible CAP vs. PCP PNA. Previously on decadron earlier in course.  - holding decadron, as above  - abx plan, as above  - continue to trend    ENDO  A1c 5.2, no PMH of diabetes  - low ISS  - holding decadron, as above  - FSG before meals and at bedtime    ID  #Severe sepsis  Presented with lactate 3.8, tachypnea, leukocytosis with neutrophilic predominance and fever. Likely due to PNA vs acute decompensated HF vs AHRF due to COVID. Less likely from UTI as denies urinary symptoms.   s/p CTX 1gr in ED. MRSA nares negative, discontinuing vancomycin  - STOP vancomycin  - c/w zosyn 3.375g q6h  - f/u blood culture, UCx  - f/u RVP, COVID swab  - obtain sputum cx if produces sputum    #HIV  Hx HIV (dx 2010). Compliant on Biktarvy. States last viral load was wnl.  - c/w Biktarvy  - f/u viral load, CD4        - start PCP ppx if CD4<200    #Asymptomatic bacteriuria  Denies dysuria, urinary frequency/urgency. No suprapubic/CVA tenderness. Urine: small LE, neg nitrites. WBC>10.   - on abx for PNA, as above  - f/u urine cx results    MSK  No active issues    PROPHYLAXIS  Fluids: None  Electrolytes: replete as necessary, K>4, Mg>2  Nutrition: DASH  Bowel Regimen: None  DVT ppx: Heparin gtt  GI ppx: Pantoprazole 40mg IV   Code: Full  Disposition: CCU   41M PMH CAD, MI (Jan 2022, stent), Left heart thrombus (Xarelto) CHF (unknown EF), colon cancer (PAWAN, s/p resection/chemo 2016), HIV (Biktarvy), former smoker (<1 pack year) BIBEMS for worsening shortness of breath for two days with nonproductive cough, admitted to CCU for acute hypoxic respiratory failure 2/2 PNA vs acute heart failure exacerbation.    NEURO  No active issues, AAOX3, neuro intact    #Depression  Home med Escitalopram, unclear if pt takes it regularly  - pending formal med rec    CVS  #Acute Decompensated Heart Failure  Known CHF. Unknown baseline EF. Home meds: Entresto, torsemide, Toprol XL  Diffuse pulmonary edema on XR, volume overloaded on exam with edema, S3, ProBNP 34,267. S/p lasix 80mg and NG drip in ED.  EKG 4/4: Sinus tach. Nonspecific st changes. LAFB. Incomplete RBBB  Troponin I 170, ProBNP 34,267  Bedside EF appears 20-25 with RMA  - c/w Lasix gtt @5mg/hr with q12 CMP and repletion  - holding Entresto, reintroduce when BP stable  - holding Toprol, reintroduce Lopressor 12.5mg when euvolemic  - f/u TTE complete with contrast  - strict I&Os    #CAD  #History of MI s/p PCI  Home asa and Plavix, atorvastatin 80mg. MI in Jan 2022 at [facility], stent placed, stenosed. Lipid panel unremarkable.  - c/w heparin gtt, likely transition back to Xarelto tomorrow  - c/w Plavix 75mg daily  - c/w atorvastatin 80mg daily  - obtain collateral from cardiologist at [facility]    #Left heart thrombus  Hx clot in left heart found around time of stent placement on New Years 2022. Home Xarelto recently switched from coumadin  - c/w heparin gtt, likely transition back to Xarelto tomorrow  - f/u TTE complete with contrast    #R Pulmonary embolism   Hypoxia, tachycardia, tachypnea,  elevated D-Dimer, elevated pro-BNP, history of cancer  - f/u BL LE ultrasound r/o DVT    PULM  #Acute Hypoxic Respiratory Failure likely 2/2 CHF exacerbation vs PNA  SOB progressing for 2 days after cold-like illness found to be in AHRF requiring BIPAP.   Febrile on admission. Elevated LDH, d-dimer, LDH, procal, ESR, CRP.   COVID, MRSA nares, Ur legionella/strep neg.  CXR shows bilateral hilar congestion and diffuse bilateral opacification   Differentials include ARDS, flash pulmonary edema, CHF exacerbation, COVID, Pneumonia. COVID negative in ED however ARDS with decreased lymphocyte%.   Less suspicion for PCP as patient states compliant on Biktarvy.  - transition to HFNC from BIPAP  - STOP decadron and monitor clinical status on diuretics  - c/w Lasix gtt, monitor UOP and CXR  - STOP vancomycin  - c/w zosyn 3.375g q6h  - f/u HIV viral load and CD4        - If CD4<200, start PCP treatment  - obtain sputum cx if produces sputum  - f/u RVP, fungitell    #Pneumonia  S/p CTX, decadron 10mg. BIPAP in ED with improvement in SpO2.   COVID, MRSA nares, Ur legionella/strep neg.  Elevated Procal 5.27,    - acute hypoxic respiratory failure plan, as above    GI  #Transaminitis  May be i/s/o acute HF exacerbation with poor perfusion (transaminits, lactate) or hepatitis i/s/o immunocompromise from HIV. Denied EtOH. Hepatitis panel wnl.  - trend CMP    #Colon Cancer s/p Resection  Resection in 2016. Colostomy bag in place. Follows with Dr. Guilherme Travis with [facility]. No active disease.  - outpatient follow-up    RENAL  #KULWINDER vs kulwinder on CKD  Elevated Cr. Unknown baseline. Likely pre-renal as also with lactate, transaminitis in acute heart failure exacerbation. FeUrea pre-renal.  - continue to trend creatinine  - avoid nephrotoxic medications    HEME  #Leukocytosis  WBC 13.87 on admission and febrile to 102.5. Procal 5.2. Being treated for possible CAP vs. PCP PNA. Previously on decadron earlier in course.  - holding decadron, as above  - abx plan, as above  - continue to trend    ENDO  A1c 5.2, no PMH of diabetes  - low ISS  - holding decadron, as above  - FSG before meals and at bedtime    ID  #Severe sepsis  Presented with lactate 3.8, tachypnea, leukocytosis with neutrophilic predominance and fever. Likely due to PNA vs acute decompensated HF vs AHRF due to COVID. Less likely from UTI as denies urinary symptoms.   s/p CTX 1gr in ED. MRSA nares negative, discontinuing vancomycin  - STOP vancomycin  - c/w zosyn 3.375g q6h  - f/u blood culture, UCx  - f/u RVP, COVID swab  - obtain sputum cx if produces sputum    #HIV  Hx HIV (dx 2010). Compliant on Biktarvy. States last viral load was wnl.  - c/w Biktarvy  - f/u viral load, CD4        - start PCP ppx if CD4<200    #Asymptomatic bacteriuria  Denies dysuria, urinary frequency/urgency. No suprapubic/CVA tenderness. Urine: small LE, neg nitrites. WBC>10.   - on abx for PNA, as above  - f/u urine cx results    MSK  No active issues    PROPHYLAXIS  Fluids: None  Electrolytes: replete as necessary, K>4, Mg>2  Nutrition: DASH  Bowel Regimen: None  DVT ppx: Heparin gtt  GI ppx: Pantoprazole 40mg IV   Code: Full  Disposition: CCU   41M PMH CAD, MI (Jan 2022, stent), Left heart thrombus (Xarelto) CHF (unknown EF), colon cancer (PAWAN, s/p resection/chemo 2016), HIV (Biktarvy), former smoker (<1 pack year) BIBEMS for worsening shortness of breath for two days with nonproductive cough, admitted to CCU for acute hypoxic respiratory failure 2/2 PNA vs acute heart failure exacerbation.    NEURO  No active issues, AAOX3, neuro intact    #Depression  Home med Escitalopram, unclear if pt takes it regularly  - pending formal med rec    CVS  #Acute Decompensated Heart Failure  Known CHF. Unknown baseline EF.  Home meds: Entresto, torsemide, Toprol XL, lisinopril  Diffuse pulmonary edema on XR, volume overloaded on exam with edema, S3, ProBNP 34,267. S/p lasix 80mg and NG drip in ED.  EKG 4/4: Sinus tach. Nonspecific st changes. LAFB. Incomplete RBBB  Troponin I 170, ProBNP 34,267  Bedside EF appears 20-25 with RMA  - c/w Lasix gtt @5mg/hr with q12 CMP and repletion  - holding Entresto, restart when BP stable  - holding Toprol, restart Lopressor 12.5mg when euvolemic  - holding lisinopril and torsemide, restart when appropriate  - f/u TTE complete with contrast  - strict I&Os    #CAD  #History of MI s/p PCI  Home asa and Plavix, atorvastatin 80mg. MI in Jan 2022 at [facility], stent placed, stenosed. Lipid panel unremarkable.  - c/w heparin gtt, likely transition back to Xarelto tomorrow  - c/w Plavix 75mg daily  - c/w atorvastatin 80mg daily  - obtain collateral from cardiologist at [facility]    #Left heart thrombus  Hx clot in left heart found around time of stent placement on New Years 2022. Home Xarelto recently switched from coumadin  - c/w heparin gtt, likely transition back to Xarelto tomorrow  - f/u TTE complete with contrast    #R Pulmonary embolism   Hypoxia, tachycardia, tachypnea,  elevated D-Dimer, elevated pro-BNP, history of cancer  - f/u BL LE ultrasound r/o DVT    PULM  #Acute Hypoxic Respiratory Failure likely 2/2 CHF exacerbation vs PNA  SOB progressing for 2 days after cold-like illness found to be in AHRF requiring BIPAP.   Febrile on admission. Elevated LDH, d-dimer, LDH, procal, ESR, CRP.   COVID, MRSA nares, Ur legionella/strep neg.  CXR shows bilateral hilar congestion and diffuse bilateral opacification   Differentials include ARDS, flash pulmonary edema, CHF exacerbation, COVID, Pneumonia. COVID negative in ED however ARDS with decreased lymphocyte%.   Less suspicion for PCP as patient states compliant on Biktarvy.  - transition to HFNC from BIPAP  - STOP decadron and monitor clinical status on diuretics  - STOP vancomycin  - c/w Lasix gtt, monitor UOP and CXR  - c/w zosyn 3.375g q6h  - f/u HIV viral load and CD4        - If CD4<200, start PCP treatment  - obtain sputum cx if produces sputum  - f/u RVP, fungitell    #Pneumonia  S/p CTX, decadron 10mg. BIPAP in ED with improvement in SpO2.   COVID, MRSA nares, Ur legionella/strep neg.  Elevated Procal 5.27,    - acute hypoxic respiratory failure plan, as above    GI  #Transaminitis  May be i/s/o acute HF exacerbation with poor perfusion (transaminits, lactate) or hepatitis i/s/o immunocompromise from HIV. Denied EtOH. Hepatitis panel wnl.  - trend CMP    #Colon Cancer s/p Resection  Resection in 2016. Colostomy bag in place. Follows with Dr. Guilherme Travis with [facility]. No active disease.  - outpatient follow-up    RENAL  #KULWINDER vs KULWINDER on CKD  Elevated Cr. Unknown baseline. Likely pre-renal as also with lactate, transaminitis in acute heart failure exacerbation. FeUrea pre-renal.  - continue to trend creatinine  - avoid nephrotoxic medications    HEME  #Leukocytosis  WBC 13.87 on admission and febrile to 102.5. Procal 5.2. Being treated for possible CAP vs. PCP PNA. Previously on decadron earlier in course.  - holding decadron, as above  - abx plan, as above  - continue to trend    ENDO  A1c 5.2, no PMH of diabetes  - low ISS  - holding decadron, as above  - FSG before meals and at bedtime    ID  #Severe sepsis  Presented with lactate 3.8, tachypnea, leukocytosis with neutrophilic predominance and fever. Likely due to PNA vs acute decompensated HF vs AHRF due to COVID. Less likely from UTI as denies urinary symptoms. Lacate now cleared.  s/p CTX 1gr in ED. s/p vanc x1. RVP, COVID, MRSA nares negative.  - STOP vancomycin  - c/w zosyn 3.375g q6h  - f/u blood culture, UCx  - obtain sputum cx if produces sputum    #HIV  Hx HIV (dx 2010). Compliant on Biktarvy. States last viral load was wnl.  - c/w Biktarvy  - f/u viral load, CD4        - start PCP ppx if CD4<200    #Asymptomatic bacteriuria  Denies dysuria, urinary frequency/urgency. No suprapubic/CVA tenderness. Urine: small LE, neg nitrites. WBC>10.   - on abx for PNA, as above  - f/u urine cx results      PROPHYLAXIS  Fluids: None  Electrolytes: replete as necessary, K>4, Mg>2  Nutrition: DASH  Bowel Regimen: None  DVT ppx: Heparin gtt  GI ppx: Pantoprazole 40mg IV   Code: Full  Disposition: CCU 41M PMH CAD, MI (Jan 2022, stent), Left heart thrombus (Xarelto) CHF (unknown EF), colon cancer (PAWAN, s/p resection/chemo 2016), HIV (Biktarvy), former smoker (<1 pack year) BIBEMS for worsening shortness of breath for two days with nonproductive cough, admitted to CCU for acute hypoxic respiratory failure 2/2 PNA vs acute heart failure exacerbation.    NEURO  No active issues, AAOX3, neuro intact    CVS  #Acute Decompensated Heart Failure  Known CHF. Unknown baseline EF.  Home meds: Entresto, torsemide, Toprol XL, lisinopril  Diffuse pulmonary edema on XR, volume overloaded on exam with edema, S3, ProBNP 34,267. S/p lasix 80mg and NG drip in ED.  EKG 4/4: Sinus tach. Nonspecific st changes. LAFB. Incomplete RBBB  Troponin I 170, ProBNP 34,267  Bedside EF appears 20-25 with RMA  - c/w Lasix gtt @5mg/hr with q12 CMP and repletion  - holding Entresto, restart when BP stable  - holding Toprol, restart Lopressor 12.5mg when euvolemic  - holding torsemide, restart when appropriate  - f/u TTE complete with contrast  - strict I&Os    #CAD  #History of MI s/p PCI  Home asa and Plavix, atorvastatin 80mg. MI in Jan 2022 at [facility], stent placed, stenosed. Lipid panel unremarkable.  - c/w heparin gtt, likely transition back to Xarelto tomorrow  - c/w Plavix 75mg daily  - c/w atorvastatin 80mg daily  - obtain collateral from cardiologist at [facility]    #Left heart thrombus  Hx clot in left heart found around time of stent placement on New Years 2022. Home Xarelto recently switched from coumadin  - c/w heparin gtt, likely transition back to Xarelto tomorrow  - f/u TTE complete with contrast    #R Pulmonary embolism   Hypoxia, tachycardia, tachypnea,  elevated D-Dimer, elevated pro-BNP, history of cancer  - f/u BL LE ultrasound r/o DVT    PULM  #Acute Hypoxic Respiratory Failure likely 2/2 CHF exacerbation vs PNA  SOB progressing for 2 days after cold-like illness found to be in AHRF requiring BIPAP.   Febrile on admission. Elevated LDH, d-dimer, LDH, procal, ESR, CRP.   COVID, MRSA nares, Ur legionella/strep neg.  CXR shows bilateral hilar congestion and diffuse bilateral opacification   Differentials include ARDS, flash pulmonary edema, CHF exacerbation, COVID, Pneumonia. COVID negative in ED however ARDS with decreased lymphocyte%.   Less suspicion for PCP as patient states compliant on Biktarvy.  - transition to HFNC from BIPAP  - STOP decadron and monitor clinical status on diuretics  - STOP vancomycin  - c/w Lasix gtt, monitor UOP and CXR  - c/w zosyn 3.375g q6h  - f/u HIV viral load and CD4        - If CD4<200, start PCP treatment  - obtain sputum cx if produces sputum  - f/u RVP, fungitell    #Pneumonia  S/p CTX, decadron 10mg. BIPAP in ED with improvement in SpO2.   COVID, MRSA nares, Ur legionella/strep neg.  Elevated Procal 5.27,    - acute hypoxic respiratory failure plan, as above    GI  #Transaminitis  May be i/s/o acute HF exacerbation with poor perfusion (transaminits, lactate) or hepatitis i/s/o immunocompromise from HIV. Denied EtOH. Hepatitis panel wnl.  - trend CMP    #Colon Cancer s/p Resection  Resection in 2016. Colostomy bag in place. Follows with Dr. Guilherme Travis with [facility]. No active disease.  - outpatient follow-up    RENAL  #KULWINDER vs KULWINDER on CKD  Elevated Cr. Unknown baseline. Likely pre-renal as also with lactate, transaminitis in acute heart failure exacerbation. FeUrea pre-renal.  - continue to trend creatinine  - avoid nephrotoxic medications    HEME  #Leukocytosis  WBC 13.87 on admission and febrile to 102.5. Procal 5.2. Being treated for possible CAP vs. PCP PNA. Previously on decadron earlier in course.  - holding decadron, as above  - abx plan, as above  - continue to trend    ENDO  A1c 5.2, no PMH of diabetes  - low ISS  - holding decadron, as above  - FSG before meals and at bedtime    ID  #Severe sepsis  Presented with lactate 3.8, tachypnea, leukocytosis with neutrophilic predominance and fever. Likely due to PNA vs acute decompensated HF vs AHRF due to COVID. Less likely from UTI as denies urinary symptoms. Lacate now cleared.  s/p CTX 1gr in ED. s/p vanc x1. RVP, COVID, MRSA nares negative.  - STOP vancomycin  - c/w zosyn 3.375g q6h  - f/u blood culture, UCx  - obtain sputum cx if produces sputum    #HIV  Hx HIV (dx 2010). States compliance on Biktarvy. States last viral load was wnl in 1/2022. Per pt, last sexual encounter was over 1 year ago.  Follows at [facility] for care.  - c/w home Biktarvy  - f/u viral load, CD4        - start PCP ppx if CD4<200    #Asymptomatic bacteriuria  Denies dysuria, urinary frequency/urgency. No suprapubic/CVA tenderness. Urine: small LE, neg nitrites. WBC>10.   - on abx for PNA, as above  - f/u urine cx results      PROPHYLAXIS  Fluids: None  Electrolytes: replete as necessary, K>4, Mg>2  Nutrition: DASH  Bowel Regimen: None  DVT ppx: Heparin gtt  GI ppx: Pantoprazole 40mg IV   Code: Full  Disposition: CCU 41M PMH CAD, MI (Jan 2022, stent), Left heart thrombus (Xarelto) CHF (unknown EF), colon cancer (PAWAN, s/p resection/chemo 2016), HIV (Biktarvy), former smoker (<1 pack year) BIBEMS for worsening shortness of breath for two days with nonproductive cough, admitted to CCU for acute hypoxic respiratory failure 2/2 PNA vs acute heart failure exacerbation.    NEURO  No active issues, AAOX3, neuro intact    CVS  #Acute Decompensated Heart Failure  Known CHF. Unknown baseline EF.  Home meds: Entresto, torsemide, Toprol XL, lisinopril  Diffuse pulmonary edema on XR, volume overloaded on exam with edema, S3, ProBNP 34,267. S/p lasix 80mg and NG drip in ED.  EKG 4/4: Sinus tach. Nonspecific st changes. LAFB. Incomplete RBBB  Troponin I 170, ProBNP 34,267  Bedside EF appears 20-25 with RMA  - c/w Lasix gtt @5mg/hr with q12 CMP and repletion  - holding Entresto, restart when BP stable  - holding Toprol, restart Lopressor 12.5mg when euvolemic  - holding torsemide, restart when appropriate  - f/u TTE complete with contrast  - strict I&Os    #CAD  #History of MI s/p PCI  Home asa and Plavix, atorvastatin 80mg. MI in Jan 2022 at [facility], stent placed, stenosed.   Primary cardiologist is at [facility] (Dr. Saniya Ramirez and Dr. Marlo Kapoor)  Lipid panel unremarkable.  - c/w heparin gtt, likely transition back to Xarelto tomorrow  - c/w Plavix 75mg daily  - c/w atorvastatin 80mg daily  - obtain collateral from cardiologist at [facility]    #Left heart thrombus  Hx clot in left heart found around time of stent placement on New Years 2022. Home Xarelto recently switched from coumadin  - c/w heparin gtt, likely transition back to Xarelto tomorrow  - f/u TTE complete with contrast    #R Pulmonary embolism   Hypoxia, tachycardia, tachypnea,  elevated D-Dimer, elevated pro-BNP, history of cancer  - f/u BL LE ultrasound r/o DVT    PULM  #Acute Hypoxic Respiratory Failure likely 2/2 CHF exacerbation vs PNA  SOB progressing for 2 days after cold-like illness found to be in AHRF requiring BIPAP.   Febrile on admission. Elevated LDH, d-dimer, LDH, procal, ESR, CRP.   COVID, MRSA nares, Ur legionella/strep neg.  CXR shows bilateral hilar congestion and diffuse bilateral opacification   Differentials include ARDS, flash pulmonary edema, CHF exacerbation, COVID, Pneumonia. COVID negative in ED however ARDS with decreased lymphocyte%.   Less suspicion for PCP as patient states compliant on Biktarvy.  - transition to HFNC from BIPAP  - STOP decadron and monitor clinical status on diuretics  - STOP vancomycin  - c/w Lasix gtt, monitor UOP and CXR  - c/w zosyn 3.375g q6h  - f/u HIV viral load and CD4        - If CD4<200, start PCP treatment  - obtain sputum cx if produces sputum  - f/u RVP, fungitell    #Pneumonia  S/p CTX, decadron 10mg. BIPAP in ED with improvement in SpO2.   COVID, MRSA nares, Ur legionella/strep neg.  Elevated Procal 5.27,    - acute hypoxic respiratory failure plan, as above    GI  #Transaminitis  May be i/s/o acute HF exacerbation with poor perfusion (transaminits, lactate) or hepatitis i/s/o immunocompromise from HIV. Denied EtOH. Hepatitis panel wnl.  - trend CMP    #Colon Cancer s/p Resection  Resection in 2016. Colostomy bag in place. Follows with Dr. Guilherme Travis with [facility]. No active disease.  - outpatient follow-up    RENAL  #KULWINDER vs KULWINDER on CKD  Elevated Cr. Unknown baseline. Likely pre-renal as also with lactate, transaminitis in acute heart failure exacerbation. FeUrea pre-renal.  - continue to trend creatinine  - avoid nephrotoxic medications    HEME  #Leukocytosis  WBC 13.87 on admission and febrile to 102.5. Procal 5.2. Being treated for possible CAP vs. PCP PNA. Previously on decadron earlier in course.  - holding decadron, as above  - abx plan, as above  - continue to trend    ENDO  A1c 5.2, no PMH of diabetes  - low ISS  - holding decadron, as above  - FSG before meals and at bedtime    ID  #Severe sepsis  Presented with lactate 3.8, tachypnea, leukocytosis with neutrophilic predominance and fever. Likely due to PNA vs acute decompensated HF vs AHRF due to COVID. Less likely from UTI as denies urinary symptoms. Lacate now cleared.  s/p CTX 1gr in ED. s/p vanc x1. RVP, COVID, MRSA nares negative.  - STOP vancomycin  - c/w zosyn 3.375g q6h  - f/u blood culture, UCx  - obtain sputum cx if produces sputum    #HIV  Hx HIV (dx 2010). States compliance on Biktarvy. States last viral load was wnl in 1/2022. Per pt, last sexual encounter was over 1 year ago.  Follows at [facility] for care.  - c/w home Biktarvy  - f/u viral load, CD4        - start PCP ppx if CD4<200    #Asymptomatic bacteriuria  Denies dysuria, urinary frequency/urgency. No suprapubic/CVA tenderness. Urine: small LE, neg nitrites. WBC>10.   - on abx for PNA, as above  - f/u urine cx results      PROPHYLAXIS  Fluids: None  Electrolytes: replete as necessary, K>4, Mg>2  Nutrition: DASH  Bowel Regimen: None  DVT ppx: Heparin gtt  GI ppx: Pantoprazole 40mg IV   Code: Full  Disposition: CCU 41M PMH CAD, MI (Jan 2022, stent), Left heart thrombus (Xarelto) CHF (unknown EF), colon cancer (PAWAN, s/p resection/chemo 2016), HIV (Biktarvy), former smoker (<1 pack year) BIBEMS for worsening shortness of breath for two days with nonproductive cough, admitted to CCU for acute hypoxic respiratory failure 2/2 PNA vs acute heart failure exacerbation.    NEURO  No active issues, AAOX3, neuro intact    CVS  #Acute Decompensated Heart Failure  Known CHF. Baseline EF on 2/2022 was 15-20% with regional wall abnormality. No valvular disease.  Primary cardiologist is at [facility] (Dr. Saniya Ramirez and Dr. Marlo Kapoor)  Home meds: Entresto, torsemide, Toprol XL  Diffuse pulmonary edema on XR, volume overloaded on exam with edema, S3, ProBNP 34,267. S/p lasix 80mg and NG drip in ED.  EKG 4/4: Sinus tach. Nonspecific st changes. LAFB. Incomplete RBBB  Troponin I 170, ProBNP 34,267  Bedside EF appears 20-25 with RMA  - c/w Lasix gtt @5mg/hr with q12 CMP and repletion  - holding Entresto, restart when BP stable  - holding Toprol, restart Lopressor 12.5mg when euvolemic  - holding torsemide, restart when appropriate  - f/u TTE complete with contrast  - strict I&Os    #CAD  #History of MI s/p PCI  Home Plavix & Xarelto, atorvastatin 80mg.  MI in Jan 2022 at [facility], stent placed to pLAD, stent re-stenosed.  Primary cardiologist is at [facility] (Dr. Saniya Ramirez and Dr. Marlo Kapoor)  Lipid panel unremarkable.  - c/w heparin gtt, likely transition back to Xarelto tomorrow  - c/w Plavix 75mg daily  - c/w atorvastatin 80mg daily    #H/o LV thrombus  After initial MI in 1/2022, small LV thrombus. Last seen on 1/6. No longer seen on subsequent studies (2/2022).  Home Xarelto recently switched from coumadin.  - c/w heparin gtt, likely transition back to Xarelto tomorrow  - f/u TTE complete with contrast    #R Pulmonary embolism   Hypoxia, tachycardia, tachypnea,  elevated D-Dimer, elevated pro-BNP, history of cancer  - f/u BL LE ultrasound r/o DVT    PULM  #Acute Hypoxic Respiratory Failure likely 2/2 CHF exacerbation vs PNA  SOB progressing for 2 days after cold-like illness found to be in AHRF requiring BIPAP.   Febrile on admission. Elevated LDH, d-dimer, LDH, procal, ESR, CRP.   COVID, MRSA nares, Ur legionella/strep neg.  CXR shows bilateral hilar congestion and diffuse bilateral opacification   Differentials include ARDS, flash pulmonary edema, CHF exacerbation, COVID, Pneumonia. COVID negative in ED however ARDS with decreased lymphocyte%.   Less suspicion for PCP as patient states compliant on Biktarvy.  - transition to HFNC from BIPAP  - STOP decadron and monitor clinical status on diuretics  - STOP vancomycin  - c/w Lasix gtt, monitor UOP and CXR  - c/w zosyn 3.375g q6h  - f/u HIV viral load and CD4        - If CD4<200, start PCP treatment  - obtain sputum cx if produces sputum  - f/u RVP, fungitell    #Pneumonia  S/p CTX, decadron 10mg. BIPAP in ED with improvement in SpO2.   COVID, MRSA nares, Ur legionella/strep neg.  Elevated Procal 5.27,    - acute hypoxic respiratory failure plan, as above    GI  #Transaminitis  May be i/s/o acute HF exacerbation with poor perfusion (transaminits, lactate) or hepatitis i/s/o immunocompromise from HIV. Denied EtOH. Hepatitis panel wnl.  - trend CMP    #Colon Cancer s/p Resection  Resection in 2016. Colostomy bag in place. Follows with Dr. Guilherme Travis with [facility]. No active disease.  - outpatient follow-up    RENAL  #KULWINDER vs KULWINDER on CKD  Elevated Cr. Unknown baseline. Likely pre-renal as also with lactate, transaminitis in acute heart failure exacerbation. FeUrea pre-renal.  - continue to trend creatinine  - avoid nephrotoxic medications    HEME  #Leukocytosis  WBC 13.87 on admission and febrile to 102.5. Procal 5.2. Being treated for possible CAP vs. PCP PNA. Previously on decadron earlier in course.  - holding decadron, as above  - abx plan, as above  - continue to trend    ENDO  A1c 5.2, no PMH of diabetes  - low ISS  - holding decadron, as above  - FSG before meals and at bedtime    ID  #Severe sepsis  Presented with lactate 3.8, tachypnea, leukocytosis with neutrophilic predominance and fever. Likely due to PNA vs acute decompensated HF vs AHRF due to COVID. Less likely from UTI as denies urinary symptoms. Lactate now cleared.  s/p CTX 1gr in ED. s/p vanc x1. RVP, COVID, MRSA nares negative.  - STOP vancomycin  - c/w zosyn 3.375g q6h  - f/u blood culture, UCx  - obtain sputum cx if produces sputum    #HIV  Hx HIV (dx 2010). States compliance on Biktarvy. States last viral load was wnl in 1/2022. Per pt, last sexual encounter was over 1 year ago.  Follows at [facility] for care.  - c/w home Biktarvy  - f/u viral load, CD4        - start PCP ppx if CD4<200    #Asymptomatic bacteriuria  Denies dysuria, urinary frequency/urgency. No suprapubic/CVA tenderness. Urine: small LE, neg nitrites. WBC>10.   - on abx for PNA, as above  - f/u urine cx results      PROPHYLAXIS  Fluids: None  Electrolytes: replete as necessary, K>4, Mg>2  Nutrition: DASH  Bowel Regimen: None  DVT ppx: Heparin gtt  GI ppx: Pantoprazole 40mg IV   Code: Full  Disposition: CCU 41M PMH CAD, MI (Jan 2022, stent), Left heart thrombus (Xarelto) CHF (unknown EF), colon cancer (PAWAN, s/p resection/chemo 2016), HIV (Biktarvy), former smoker (<1 pack year) BIBEMS for worsening shortness of breath for two days with nonproductive cough, admitted to CCU for acute hypoxic respiratory failure 2/2 PNA vs acute heart failure exacerbation.    NEURO  No active issues, AAOX3, neuro intact    CVS  #Acute Decompensated Heart Failure  Known CHF. Baseline EF on 2/2022 was 15-20% with regional wall abnormality. No valvular disease.  Primary cardiologist is at [facility] (Dr. Saniya Ramirez and Dr. Marlo Kapoor)  Home meds: Entresto, torsemide, Toprol XL  Diffuse pulmonary edema on XR, volume overloaded on exam with edema, S3, ProBNP 34,267. S/p lasix 80mg and NG drip in ED.  EKG 4/4: Sinus tach. Nonspecific st changes. LAFB. Incomplete RBBB  Troponin I 170, ProBNP 34,267  Bedside EF appears 20-25 with RMA  - c/w Lasix gtt @5mg/hr with q12 CMP and repletion  - holding Entresto, restart when BP stable  - holding Toprol, restart Lopressor 12.5mg when euvolemic  - holding torsemide, restart when appropriate  - f/u TTE complete with contrast  - strict I&Os    #CAD  #History of MI s/p PCI  Home Plavix & Xarelto, atorvastatin 80mg.  MI in Jan 2022 at [facility], stent placed to pLAD, stent re-stenosed.  Primary cardiologist is at [facility] (Dr. Saniya Ramirez and Dr. Marlo Kapoor)  Lipid panel unremarkable.  - c/w heparin gtt, likely transition back to Xarelto tomorrow  - c/w Plavix 75mg daily  - c/w atorvastatin 80mg daily    #H/o LV thrombus  After initial MI in 1/2022, small LV thrombus. Last seen on 1/6. No longer seen on subsequent studies (2/2022).  Home Xarelto recently switched from coumadin.  - c/w heparin gtt, likely transition back to Xarelto tomorrow  - f/u TTE complete with contrast    #R Pulmonary embolism   Hypoxia, tachycardia, tachypnea,  elevated D-Dimer, elevated pro-BNP, history of cancer  - f/u BL LE ultrasound r/o DVT    PULM  #Acute Hypoxic Respiratory Failure likely 2/2 CHF exacerbation vs PNA  SOB progressing for 2 days after cold-like illness found to be in AHRF requiring BIPAP.   Febrile on admission. Elevated LDH, d-dimer, LDH, procal, ESR, CRP.   COVID, MRSA nares, Ur legionella/strep neg.  CXR shows bilateral hilar congestion and diffuse bilateral opacification   Differentials include ARDS, flash pulmonary edema, CHF exacerbation, COVID, Pneumonia. COVID negative in ED however ARDS with decreased lymphocyte%.   Less suspicion for PCP as patient states compliant on Biktarvy.  - transition to HFNC from BIPAP  - STOP decadron and monitor clinical status on diuretics  - STOP vancomycin  - c/w Lasix gtt, monitor UOP and CXR  - c/w zosyn 3.375g q6h  - f/u HIV viral load and CD4        - If CD4<200, start PCP treatment  - obtain sputum cx if produces sputum  - f/u RVP, fungitell    #Pneumonia  S/p CTX, decadron 10mg. BIPAP in ED with improvement in SpO2.   COVID, MRSA nares, Ur legionella/strep neg.  Elevated Procal 5.27,    - acute hypoxic respiratory failure plan, as above    GI  #Transaminitis  May be i/s/o acute HF exacerbation with poor perfusion (transaminits, lactate) or hepatitis i/s/o immunocompromise from HIV. Denied EtOH. Hepatitis panel wnl.  - trend CMP    #Colon Cancer s/p Resection  Resection in 2016. Colostomy bag in place. Follows with Dr. Guilherme Travis with [facility]. No active disease.  - outpatient follow-up    RENAL  #KULWINDER vs KULWINDER on CKD  Elevated Cr. Unknown baseline. Likely pre-renal as also with lactate, transaminitis in acute heart failure exacerbation. FeUrea pre-renal.  - continue to trend creatinine  - avoid nephrotoxic medications    HEME  #Leukocytosis  WBC 13.87 on admission and febrile to 102.5. Procal 5.2. Being treated for possible CAP vs. PCP PNA. Previously on decadron earlier in course.  - holding decadron, as above  - abx plan, as above  - continue to trend    ENDO  A1c 5.2, no PMH of diabetes  - low ISS  - holding decadron, as above  - FSG before meals and at bedtime    ID  #Severe sepsis  Presented with lactate 3.8, tachypnea, leukocytosis with neutrophilic predominance and fever.   Likely due to PNA vs acute decompensated HF vs AHRF due to COVID. Less likely from UTI as denies urinary symptoms. Lactate now cleared.  s/p CTX 1gr in ED. s/p vanc x1. RVP, COVID, MRSA nares negative.  - STOP vancomycin  - c/w zosyn 3.375g q6h  - f/u blood culture, UCx  - obtain sputum cx if produces sputum    #HIV  Febrile on admission.  Hx HIV (dx 2010). States compliance on Biktarvy. States last viral load was wnl in 1/2022. Per pt, last sexual encounter was over 1 year ago.  Follows at [facility] for care.  - c/w home Biktarvy  - f/u viral load, CD4        - start PCP ppx if CD4<200    #Asymptomatic bacteriuria  Denies dysuria, urinary frequency/urgency. No suprapubic/CVA tenderness. Urine: small LE, neg nitrites. WBC>10.   - on abx for PNA, as above  - f/u urine cx results      PROPHYLAXIS  Fluids: None  Electrolytes: replete as necessary, K>4, Mg>2  Nutrition: DASH  Bowel Regimen: None  DVT ppx: Heparin gtt  GI ppx: Pantoprazole 40mg IV   Code: Full  Disposition: CCU

## 2022-04-04 NOTE — PATIENT PROFILE ADULT - HAS THE PATIENT USED TOBACCO IN THE PAST 30 DAYS?
Gen: Alert, NAD  Head: NC, AT   Eyes: PERRL, EOMI, normal lids/conjunctiva  ENT: normal hearing, patent oropharynx without erythema/exudate, uvula midline  Neck: supple, no tenderness, Trachea midline  Pulm: Bilateral BS, normal resp effort, no wheeze/stridor/retractions  CV: RRR, no M/R/G, 2+ radial and dp pulses bl, no edema  Abd: soft, NT/ND, +BS, no hepatosplenomegaly  Mskel: extremities x4 with normal ROM and no joint effusions. no ctl spine ttp.   Skin: blanching erythema of the bl le   Neuro: AAOx3, tremulous, CN 2-12 intact No

## 2022-04-04 NOTE — H&P ADULT - ASSESSMENT
NEURO  No active issues    CVS  No active issues    PULM  No active issues    GI  No active issues    RENAL  No active issues    HEME/ONC  No active issues    ENDO  No active issues    ID  No active issues    MSK  No active issues    PROPHYLAXIS  Fluids: None  Electrolytes: replete as necessary, K>4, Mg>2  Nutrition:   Bowel Regimen: None  DVT ppx:   GI ppx: None    ACCESS  Code: Full  Disposition: CCU   NEURO  No active issues      CVS    #Acute Decompensated Heart Failure  Known CHF. Home meds home med metoprolol, lasix, entresto, . Unknown baseline EF. Diffuse pulmonary edema on XR, volume overloaded on exam with edema, S3, ProBNP 34,267. S/p lasix 80mg and NG drip in ED.  - Troponin I 170, ProBNP 34,267  - F/u TTE complete   - Bedside EF appears 20-25 with RMA  - Discontinue NG gtt  - Continue Lasix gtt 5mg/hr  - Obtain q6 CMP  - Strict Is & Os  - Hold entresto i/s/o KULWINDER  - Hold metoprolol    #History of MI  Home asa and plavix, atorvastatin 80mg. MI in Jan 2022 at [facility], stent placed, stenosed.   -Continue as heparin, plavix  -F/u Lipid panel    #Left heart thrombus  Hx clot in left heart found around time of stent placement on New Years 2022. Home Xarelto recently switched from coumadin  - Heparin gtt     PULM  #Acute Hypoxic Respiratory Failure likely 2/2 CHF exacerbation vs PNA  CXR: bilateral diffuse opacification. Enlarged cardiac sillouette. Hyperinflated lungs with flattened diaphragm  Differentials include ARDS, flash pulmonary edema, CHF exacerbation, COVID, Pneumostis. COVID negative in ED however ARDS with decreased lymphocyte%. Low suspicion for PCP as patient compliant with Biktarvy, reports good management.  - Continue BiPap  - Stop NG drip  - Lasix gtt, monitor UOP  - ABG frequent  - ED ABG: Respiratory alkalosis  - F/u PCP, viral load  - Treat as PNA below    #Pneumonia  S/p CTX, decadron 10mg. BiPap in ED with improvement in saturation.  - Elevated Procal 5.27  -    - Obtain sputum culture, RVP, Covid and MRSA swab, fungitell, ESR  - Start Vanc, Zosyn  - Continue decadon 6mg x 10 days  - If CD4<200 start PCP treatment  - See above    GI    #Transaminitis  May be i/s/o acute HF exacerbation with poor perfusion (transaminits, lactate) or hepatitis i/s/o immunocompromise from HIV. Denied etoh.  - Repeat CMP  - F/u hepatitis panel    RENAL    #KULWINDER  Elevated Cr. Unknown baseline. Likely pre-renal as also with lactate, tranaminitis in acute heart failure exac   - F/u urine studies  HEME/ONC  No active issues    ENDO  No active issues    ID  #Severe sepsis  Presented with lactate 3.8, tachypnea, leukocytosis and fever. Likely due to pna vs acute decompensated HF. Neutotrophil predominance.  - f/u blood culture, UCx  - Obtain sputum culture, RVP, Covid MRSA swab, ESR CRP, procal, repeat lactate  - F/u Blood and urine Cx  - Continue CTX 1g x 5 days  - Start Vanc, Zosyn  - Continue decadron 6mg x 10 days    #HIV  Hx HIV. Takes Biktarvy regularly  -F/u viral load, CD4, hepatitis panel  - Continue Biktarvy    #UTI  Urine: leuk esterase, WBC>10, Bacteria. Denied dysuria. No suprapubic tenderness  - Continue Abx above  -F/u urine cx    MSK  No active issues    PROPHYLAXIS  Fluids: None  Electrolytes: replete as necessary, K>4, Mg>2  Nutrition: NPO--> DASH  Bowel Regimen: None  DVT ppx: Heparin gtt  GI ppx: Pantoprazole 40mg    ACCESS  Code: Full  Disposition: CCU   NEURO  No active issues      CVS    #Acute Decompensated Heart Failure  Known CHF. Home meds home med metoprolol, lasix, entresto, . Unknown baseline EF. Diffuse pulmonary edema on XR, volume overloaded on exam with edema, S3, ProBNP 34,267. S/p lasix 80mg and NG drip in ED.  - Troponin I 170, ProBNP 34,267  - F/u TTE complete   - Bedside EF appears 20-25 with RMA  - Discontinue NG gtt  - Continue Lasix gtt 5mg/hr  - Obtain q6 CMP  - Strict Is & Os  - Hold entresto i/s/o KULWINDER  - Hold metoprolol  - Formal med rec in AM    #History of MI  Home asa and plavix, atorvastatin 80mg. MI in Jan 2022 at [facility], stent placed, stenosed.   -Continue as heparin, plavix  -F/u Lipid panel    #Left heart thrombus  Hx clot in left heart found around time of stent placement on New Years 2022. Home Xarelto recently switched from coumadin  - Heparin gtt     PULM  #Acute Hypoxic Respiratory Failure likely 2/2 CHF exacerbation vs PNA  CXR: bilateral diffuse opacification. Enlarged cardiac sillouette. Hyperinflated lungs with flattened diaphragm  Differentials include ARDS, flash pulmonary edema, CHF exacerbation, COVID, Pneumostis. COVID negative in ED however ARDS with decreased lymphocyte%. Low suspicion for PCP as patient compliant with Biktarvy, reports good management.  - Continue BiPap  - Stop NG drip  - Lasix gtt, monitor UOP  - ABG frequent  - ED ABG: Respiratory alkalosis  - F/u PCP, viral load  - Treat as PNA below    #Pneumonia  S/p CTX, decadron 10mg. BiPap in ED with improvement in saturation.  - Elevated Procal 5.27  -    - Obtain sputum culture, RVP, Covid and MRSA swab, fungitell, ESR  - Start Vanc, Zosyn  - Continue decadon 6mg x 10 days  - If CD4<200 start PCP treatment  - See above    GI    #Transaminitis  May be i/s/o acute HF exacerbation with poor perfusion (transaminits, lactate) or hepatitis i/s/o immunocompromise from HIV. Denied etoh.  - Repeat CMP  - F/u hepatitis panel    #Colon Cancer s/p Resection  Resection in 2016. Colostomy bag in place  - No acute interventions    RENAL    #KULWINDER  Elevated Cr. Unknown baseline. Likely pre-renal as also with lactate, transaminitis in acute heart failure exacerbation  - F/u urine studies  HEME/ONC  No active issues    ENDO  No active issues    ID  #Severe sepsis  Presented with lactate 3.8, tachypnea, leukocytosis and fever. Likely due to pna vs acute decompensated HF. Neutotrophil predominance.  - f/u blood culture, UCx  - Obtain sputum culture, RVP, Covid MRSA swab, ESR CRP, procal, repeat lactate  - F/u Blood and urine Cx  - Continue CTX 1g x 5 days  - Start Vanc, Zosyn  - Continue decadron 6mg x 10 days    #HIV  Hx HIV. Takes Biktarvy regularly. States last viral load was wnl  -F/u viral load, CD4, hepatitis panel  - Continue Biktarvy    #UTI  Urine: leuk esterase, WBC>10, Bacteria. Denied dysuria. No suprapubic tenderness  - Continue Abx above  -F/u urine cx    MSK  No active issues    PROPHYLAXIS  Fluids: None  Electrolytes: replete as necessary, K>4, Mg>2  Nutrition: NPO--> DASH  Bowel Regimen: None  DVT ppx: Heparin gtt  GI ppx: Pantoprazole 40mg    ACCESS  Code: Full  Disposition: CCU   NEURO  No active issues, AAOX3, neuro intact    #Depression  Home med Escitalopram, unclear if pt takes it regularly. collateral from pharmacy in AM      CVS    #Acute Decompensated Heart Failure  Known CHF. Non definitive home meds: metoprolol, lasix/ eplerenone, entresto, lisinopril? (GDMT) . Unknown baseline EF.   Diffuse pulmonary edema on XR, volume overloaded on exam with edema, S3, ProBNP 34,267. S/p lasix 80mg and NG drip in ED.  - Troponin I 170, ProBNP 34,267  - F/u TTE complete AM  - Bedside EF appears 20-25 with RMA  - Discontinue NG gtt  - Continue Lasix gtt 5mg/hr  - Obtain q12 CMP since on lasix gtt  -replete electrolyte as needed  - Strict Is & Os  - Hold entresto i/s/o KULWINDER  - Hold metoprolol  - Formal med rec in AM    #History of MI  Home asa and plavix, atorvastatin 80mg. MI in Jan 2022 at [facility], stent placed, stenosed.   -Continue as heparin, plavix  -F/u Lipid panel AM    #Left heart thrombus  Hx clot in left heart found around time of stent placement on New Years 2022. Home Xarelto recently switched from coumadin  - Heparin gtt     PULM  #Acute Hypoxic Respiratory Failure likely 2/2 CHF exacerbation vs PNA  CXR: bilateral hilar congestion and diffuse opacification. Enlarged cardiac sillouette. Hyperinflated lungs with flattened diaphragm and hx of smoking c/f COPD  Differentials include ARDS, flash pulmonary edema, CHF exacerbation, COVID but no complaint of cough, Pneumonia. COVID negative in ED however ARDS with decreased lymphocyte%. Low suspicion for PCP as patient compliant with Biktarvy, reports good management.  - Continue BiPap  - Stop NG drip  - Lasix gtt, monitor UOP  - ABG frequent  - ED ABG: Respiratory alkalosis  - F/u viral load  - Treat as PNA below    #Pneumonia  S/p CTX, decadron 10mg. BiPap in ED with improvement in saturation.  - Elevated Procal 5.27  -    - Obtain sputum culture if has any productive cough  -f/u RVP, Covid and MRSA swab, fungitell, ESR  - Start Vanc, Zosyn  - Continue decadon 6mg x 9 days  - If CD4<200 start PCP treatment  - See above    GI    #Transaminitis  May be i/s/o acute HF exacerbation with poor perfusion (transaminits, lactate) or hepatitis i/s/o immunocompromise from HIV. Denied etoh.  - Trend CMP  - F/u hepatitis panel    #Colon Cancer s/p Resection  Resection in 2016. Colostomy bag in place  - No acute interventions    RENAL    #KULWINDER vs kulwinder on CKD   Elevated Cr. Unknown baseline. Likely pre-renal as also with lactate, transaminitis in acute heart failure exacerbation  - F/u urine studies    HEME/ONC  No active issues    ENDO  -Start iSSC since on decardon   -f/u HA1c   -F/u lipid panel    ID  #Severe sepsis  Presented with lactate 3.8, tachypnea, leukocytosis and fever. Likely due to pna vs acute decompensated HF vs AHRF due to COVID vs UTI. Neutralophile predominance.  - f/u blood culture, UCx  - Obtain sputum culture,  -f/u  RVP, Covid MRSA swab, ESR CRP, procal, repeat lactat  -s/p CTX 1gr in ED   - Start Vanc, Zosyn  - Continue decadron 6mg x 9 days    #HIV  Hx HIV. Takes Biktarvy regularly. States last viral load was wnl  -F/u viral load, CD4, hepatitis panel  - Continue Biktarvy  -Start PCP ppx if CD4<200    #UTI  Urine: leuk esterase, WBC>10, Bacteria. Denied dysuria. No suprapubic tenderness  - Continue Abx above  -F/u urine cx    MSK  No active issues    PROPHYLAXIS  Fluids: None  Electrolytes: replete as necessary, K>4, Mg>2  Nutrition: NPO since on BIPAP --> DASH  Bowel Regimen: None  DVT ppx: Heparin gtt  GI ppx: Pantoprazole 40mg IV     ACCESS  Code: Full  Disposition: CCU   41M PMH CAD, MI (Jan 2022, stent), Left heart thrombus (Xarelto) CHF, colon cancer (s/p resection 2016), HIV (Biktarvy), former smoker (1pack/week for 3 years) BIBEMS for worsening shortness of breath for two days i/s/o recent cold, admitted to CCU for acute hypoxic respiratory failure likely 2/2 PNA vs acute heart failure exacerbation.    NEURO  No active issues, AAOX3, neuro intact    #Depression  Home med Escitalopram, unclear if pt takes it regularly  - Collateral from pharmacy in AM      CVS    #Acute Decompensated Heart Failure  Known CHF. Non definitive home meds: metoprolol, lasix/ eplerenone, entresto, lisinopril? (GDMT) . Unknown baseline EF.   Diffuse pulmonary edema on XR, volume overloaded on exam with edema, S3, ProBNP 34,267. S/p lasix 80mg and NG drip in ED.  - Troponin I 170, ProBNP 34,267  - F/u TTE complete AM  - Bedside EF appears 20-25 with RMA  - Discontinue NG gtt  - Continue Lasix gtt 5mg/hr with q12 CMP and repletion  - Strict Is & Os  - Hold entresto i/s/o KULWINDER  - Hold metoprolol  - Formal med rec in AM    #History of MI  Home asa and plavix, atorvastatin 80mg. MI in Jan 2022 at [facility], stent placed, stenosed.   -Continue as heparin, plavix  -F/u Lipid panel AM    #Left heart thrombus  Hx clot in left heart found around time of stent placement on New Years 2022. Home Xarelto recently switched from coumadin  - Heparin gtt     PULM  #Acute Hypoxic Respiratory Failure likely 2/2 CHF exacerbation vs PNA  SOB progressing for 2 days after cold-like illness found to be in AHRF requiring BiPap.  - CXR: bilateral hilar congestion and diffuse opacification. Enlarged cardiac sillouette. Hyperinflated lungs with flattened diaphragm and hx of smoking c/f COPD  Differentials include ARDS, flash pulmonary edema, CHF exacerbation, COVID, Pneumonia. COVID negative in ED however ARDS with decreased lymphocyte%. Low suspicion for PCP as patient compliant with Biktarvy, reports good management.  - Continue BiPap  - Stop NG drip  - Lasix gtt, monitor UOP  - ABG frequent  - ED ABG: Respiratory alkalosis  - F/u viral load  - Treat as PNA below    #Pneumonia  S/p CTX, decadron 10mg. BiPap in ED with improvement in saturation.  - Elevated Procal 5.27  -    - Obtain sputum culture if has any productive cough  -f/u RVP, Covid and MRSA swab, fungitell, ESR  - Start Vanc, Zosyn  - Continue decadon 6mg x 9 days  - If CD4<200 start PCP treatment  - See above    GI    #Transaminitis  May be i/s/o acute HF exacerbation with poor perfusion (transaminits, lactate) or hepatitis i/s/o immunocompromise from HIV. Denied etoh.  - Trend CMP  - F/u hepatitis panel    #Colon Cancer s/p Resection  Resection in 2016. Colostomy bag in place  - No acute interventions    RENAL    #KULWINDER vs kulwinder on CKD   Elevated Cr. Unknown baseline. Likely pre-renal as also with lactate, transaminitis in acute heart failure exacerbation  - F/u urine studies    HEME/ONC  No active issues    ENDO  -Start iSS since on decadron   -f/u A1c   -F/u lipid panel    ID  #Severe sepsis  Presented with lactate 3.8, tachypnea, leukocytosis with neutrophilic predominance and fever. Likely due to pna vs acute decompensated HF vs AHRF due to COVID. Less likely from UTI as no complaints of fever, dysuria.   - f/u blood culture, UCx  - Obtain sputum culture if productive cough develops  -f/u  RVP, Covid MRSA swab, ESR CRP, procal, repeat lactat  -s/p CTX 1gr in ED   - Start Vanc, Zosyn  - Continue decadron 6mg x 9 days    #HIV  Hx HIV. Takes Biktarvy regularly. States last viral load was wnl  -F/u viral load, CD4, hepatitis panel  - Continue Biktarvy  -Start PCP ppx if CD4<200    #UTI  Urine: leuk esterase, WBC>10, Bacteria. Denied dysuria. No suprapubic tenderness  - Continue Abx above  -F/u urine cx    MSK  No active issues    PROPHYLAXIS  Fluids: None  Electrolytes: replete as necessary, K>4, Mg>2  Nutrition: NPO since on BIPAP --> DASH  Bowel Regimen: None  DVT ppx: Heparin gtt  GI ppx: Pantoprazole 40mg IV     ACCESS  Code: Full  Disposition: CCU   41M PMH CAD, MI (Jan 2022, stent), Left heart thrombus (Xarelto) CHF, colon cancer (s/p resection 2016), HIV (Biktarvy), former smoker (1pack/week for 3 years) BIBEMS for worsening shortness of breath for two days i/s/o recent cold, admitted to CCU for acute hypoxic respiratory failure likely 2/2 PNA vs acute heart failure exacerbation.    NEURO  No active issues, AAOX3, neuro intact    #Depression  Home med Escitalopram, unclear if pt takes it regularly  - Collateral from pharmacy in AM      CVS    #Acute Decompensated Heart Failure  Known CHF. Non definitive home meds: metoprolol, lasix/ eplerenone, entresto, lisinopril? (GDMT) . Unknown baseline EF.   Diffuse pulmonary edema on XR, volume overloaded on exam with edema, S3, ProBNP 34,267. S/p lasix 80mg and NG drip in ED.  - EKG 4/4: Sinus tach. Nonspecific st changes. LAFB. Incomplete RBBB  - Troponin I 170, ProBNP 34,267  - F/u TTE complete AM  - Bedside EF appears 20-25 with RMA  - Discontinue NG gtt  - Continue Lasix gtt 5mg/hr with q12 CMP and repletion  - Strict Is & Os  - Hold entresto i/s/o KULWINDER  - Hold metoprolol  - Formal med rec in AM    #History of MI  Home asa and plavix, atorvastatin 80mg. MI in Jan 2022 at [facility], stent placed, stenosed.   -Continue as heparin, plavix  -F/u Lipid panel AM    #Left heart thrombus  Hx clot in left heart found around time of stent placement on New Years 2022. Home Xarelto recently switched from coumadin  - Heparin gtt     PULM  #Acute Hypoxic Respiratory Failure likely 2/2 CHF exacerbation vs PNA  SOB progressing for 2 days after cold-like illness found to be in AHRF requiring BiPap.  - CXR: bilateral hilar congestion and diffuse opacification. Enlarged cardiac sillouette. Hyperinflated lungs with flattened diaphragm and hx of smoking c/f COPD  Differentials include ARDS, flash pulmonary edema, CHF exacerbation, COVID, Pneumonia. COVID negative in ED however ARDS with decreased lymphocyte%. Low suspicion for PCP as patient compliant with Biktarvy, reports good management.  - Continue BiPap  - Stop NG drip  - Lasix gtt, monitor UOP  - ABG frequent  - ED ABG: Respiratory alkalosis  - F/u viral load  - Treat as PNA below    #Pneumonia  S/p CTX, decadron 10mg. BiPap in ED with improvement in saturation.  - Elevated Procal 5.27  -    - Obtain sputum culture if has any productive cough  -f/u RVP, Covid and MRSA swab, fungitell, ESR  - Start Vanc, Zosyn  - Continue decadon 6mg x 9 days  - If CD4<200 start PCP treatment  - See above    GI    #Transaminitis  May be i/s/o acute HF exacerbation with poor perfusion (transaminits, lactate) or hepatitis i/s/o immunocompromise from HIV. Denied etoh.  - Trend CMP  - F/u hepatitis panel    #Colon Cancer s/p Resection  Resection in 2016. Colostomy bag in place  - No acute interventions    RENAL    #KULWINDER vs kulwinder on CKD   Elevated Cr. Unknown baseline. Likely pre-renal as also with lactate, transaminitis in acute heart failure exacerbation  - F/u urine studies    HEME/ONC  No active issues    ENDO  -Start iSS since on decadron   -f/u A1c   -F/u lipid panel    ID  #Severe sepsis  Presented with lactate 3.8, tachypnea, leukocytosis with neutrophilic predominance and fever. Likely due to pna vs acute decompensated HF vs AHRF due to COVID. Less likely from UTI as no complaints of fever, dysuria.   - f/u blood culture, UCx  - Obtain sputum culture if productive cough develops  -f/u  RVP, Covid MRSA swab, ESR CRP, procal, repeat lactat  -s/p CTX 1gr in ED   - Start Vanc, Zosyn  - Continue decadron 6mg x 9 days    #HIV  Hx HIV. Takes Biktarvy regularly. States last viral load was wnl  -F/u viral load, CD4, hepatitis panel  - Continue Biktarvy  -Start PCP ppx if CD4<200    #UTI  Urine: leuk esterase, WBC>10, Bacteria. Denied dysuria. No suprapubic tenderness  - Continue Abx above  -F/u urine cx    MSK  No active issues    PROPHYLAXIS  Fluids: None  Electrolytes: replete as necessary, K>4, Mg>2  Nutrition: NPO since on BIPAP --> DASH  Bowel Regimen: None  DVT ppx: Heparin gtt  GI ppx: Pantoprazole 40mg IV     ACCESS  Code: Full  Disposition: CCU   41M PMH CAD, MI (Jan 2022, stent), Left heart thrombus (Xarelto) CHF, colon cancer (s/p resection 2016), HIV (Biktarvy), former smoker (1pack/week for 3 years) BIBEMS for worsening shortness of breath for two days i/s/o recent cold, admitted to CCU for acute hypoxic respiratory failure likely 2/2 PNA vs acute heart failure exacerbation.    NEURO  No active issues, AAOX3, neuro intact    #Depression  Home med Escitalopram, unclear if pt takes it regularly  - Collateral from pharmacy in AM      CVS    #Acute Decompensated Heart Failure  Known CHF. Non definitive home meds: metoprolol, lasix/ eplerenone, entresto, lisinopril? (GDMT) . Unknown baseline EF.   Diffuse pulmonary edema on XR, volume overloaded on exam with edema, S3, ProBNP 34,267. S/p lasix 80mg and NG drip in ED.  - EKG 4/4: Sinus tach. Nonspecific st changes. LAFB. Incomplete RBBB  - Troponin I 170, ProBNP 34,267  - F/u TTE complete AM  - Bedside EF appears 20-25 with RMA  - Discontinue NG gtt  - Continue Lasix gtt 5mg/hr with q12 CMP and repletion  - Strict Is & Os  - Hold entresto i/s/o KULWINDER  - Hold metoprolol  - Formal med rec in AM    #History of MI  Home asa and plavix, atorvastatin 80mg. MI in Jan 2022 at [facility], stent placed, stenosed.   -Continue as heparin, plavix  -F/u Lipid panel AM    #Left heart thrombus  Hx clot in left heart found around time of stent placement on New Years 2022. Home Xarelto recently switched from coumadin  - Heparin gtt     PULM  #Acute Hypoxic Respiratory Failure likely 2/2 CHF exacerbation vs PNA  SOB progressing for 2 days after cold-like illness found to be in AHRF requiring BiPap.  - CXR: bilateral hilar congestion and diffuse opacification. Enlarged cardiac sillouette. Hyperinflated lungs with flattened diaphragm and hx of smoking c/f COPD  Differentials include ARDS, flash pulmonary edema, CHF exacerbation, COVID, Pneumonia. COVID negative in ED however ARDS with decreased lymphocyte%. Low suspicion for PCP as patient compliant with Biktarvy, reports good management.  - Continue BiPap  - Stop NG drip  - Lasix gtt, monitor UOP  - ABG frequent  - ED ABG: Respiratory alkalosis  - F/u viral load  - Treat as PNA below    #Pneumonia  S/p CTX, decadron 10mg. BiPap in ED with improvement in saturation.  - Elevated Procal 5.27  -    - Obtain sputum culture if has any productive cough  -f/u RVP, Covid and MRSA swab, fungitell, ESR, urine strep & legionella   - Start Vanc, Zosyn  - Continue decadon 6mg x 9 days  - If CD4<200 start PCP treatment  - See above    GI    #Transaminitis  May be i/s/o acute HF exacerbation with poor perfusion (transaminits, lactate) or hepatitis i/s/o immunocompromise from HIV. Denied etoh.  - Trend CMP  - F/u hepatitis panel    #Colon Cancer s/p Resection  Resection in 2016. Colostomy bag in place  - No acute interventions    RENAL    #KULWINDER vs kulwinder on CKD   Elevated Cr. Unknown baseline. Likely pre-renal as also with lactate, transaminitis in acute heart failure exacerbation  - F/u urine studies    HEME/ONC  No active issues    ENDO  -Start iSS since on decadron   -f/u A1c   -F/u lipid panel    ID  #Severe sepsis  Presented with lactate 3.8, tachypnea, leukocytosis with neutrophilic predominance and fever. Likely due to pna vs acute decompensated HF vs AHRF due to COVID. Less likely from UTI as no complaints of fever, dysuria.   - f/u blood culture, UCx  - Obtain sputum culture if productive cough develops  -f/u  RVP, Covid MRSA swab, ESR CRP, procal, repeat lactat  -s/p CTX 1gr in ED   - Start Vanc, Zosyn  - Continue decadron 6mg x 9 days    #HIV  Hx HIV. Takes Biktarvy regularly. States last viral load was wnl  -F/u viral load, CD4, hepatitis panel  - Continue Biktarvy  -Start PCP ppx if CD4<200    #UTI  Urine: leuk esterase, WBC>10, Bacteria. Denied dysuria. No suprapubic tenderness  - Continue Abx above  -F/u urine cx    MSK  No active issues    PROPHYLAXIS  Fluids: None  Electrolytes: replete as necessary, K>4, Mg>2  Nutrition: NPO since on BIPAP --> DASH  Bowel Regimen: None  DVT ppx: Heparin gtt  GI ppx: Pantoprazole 40mg IV     ACCESS  Code: Full  Disposition: CCU   41M PMH CAD, MI (Jan 2022, stent), Left heart thrombus (Xarelto) CHF, colon cancer (s/p resection 2016), HIV (Biktarvy), former smoker (1pack/week for 3 years) BIBEMS for worsening shortness of breath for two days i/s/o recent cold, admitted to CCU for acute hypoxic respiratory failure likely 2/2 PNA vs acute heart failure exacerbation.    NEURO  No active issues, AAOX3, neuro intact    #Depression  Home med Escitalopram, unclear if pt takes it regularly  - Collateral from pharmacy in AM      CVS    #Acute Decompensated Heart Failure  Known CHF. Non definitive home meds: metoprolol, lasix/ eplerenone, entresto, lisinopril? (GDMT) . Unknown baseline EF.   Diffuse pulmonary edema on XR, volume overloaded on exam with edema, S3, ProBNP 34,267. S/p lasix 80mg and NG drip in ED.  - EKG 4/4: Sinus tach. Nonspecific st changes. LAFB. Incomplete RBBB  - Troponin I 170, ProBNP 34,267  - F/u TTE complete AM  - Bedside EF appears 20-25 with RMA  - Discontinue NG gtt  - Continue Lasix gtt 5mg/hr with q12 CMP and repletion  - Strict Is & Os  - Hold entresto i/s/o KULWINDER  - Hold metoprolol  - Formal med rec in AM    #History of MI  Home asa and plavix, atorvastatin 80mg. MI in Jan 2022 at [facility], stent placed, stenosed.   -Continue as heparin, plavix  -F/u Lipid panel AM    #Left heart thrombus  Hx clot in left heart found around time of stent placement on New Years 2022. Home Xarelto recently switched from coumadin  - Heparin gtt     #R Pulmonary embolism   Hypoxia, tachycardia, tachypnea,  elevated D-Dimer, elevated pro-BNP, history of cancer  -Consider CT PE in AM       PULM  #Acute Hypoxic Respiratory Failure likely 2/2 CHF exacerbation vs PNA  SOB progressing for 2 days after cold-like illness found to be in AHRF requiring BiPap.  - CXR: bilateral hilar congestion and diffuse opacification. Enlarged cardiac sillouette. Hyperinflated lungs with flattened diaphragm and hx of smoking c/f COPD  Differentials include ARDS, flash pulmonary edema, CHF exacerbation, COVID, Pneumonia. COVID negative in ED however ARDS with decreased lymphocyte%. Low suspicion for PCP as patient compliant with Biktarvy, reports good management.  - Continue BiPap  - Stop NG drip  - Lasix gtt, monitor UOP  - ABG frequent  - ED ABG: Respiratory alkalosis  - F/u viral load  - Treat as PNA below    #Pneumonia  S/p CTX, decadron 10mg. BiPap in ED with improvement in saturation.  - Elevated Procal 5.27  -    - Obtain sputum culture if has any productive cough  -f/u RVP, Covid and MRSA swab, fungitell, ESR, urine strep & legionella   - Start Vanc, Zosyn  - Continue decadon 6mg x 9 days  - If CD4<200 start PCP treatment  - See above      GI    #Transaminitis  May be i/s/o acute HF exacerbation with poor perfusion (transaminits, lactate) or hepatitis i/s/o immunocompromise from HIV. Denied etoh.  - Trend CMP  - F/u hepatitis panel    #Colon Cancer s/p Resection  Resection in 2016. Colostomy bag in place  - No acute interventions    RENAL    #KULWINDER vs kulwinder on CKD   Elevated Cr. Unknown baseline. Likely pre-renal as also with lactate, transaminitis in acute heart failure exacerbation  - F/u urine studies    HEME/ONC  No active issues    ENDO  -Start iSS since on decadron   -f/u A1c   -F/u lipid panel    ID  #Severe sepsis  Presented with lactate 3.8, tachypnea, leukocytosis with neutrophilic predominance and fever. Likely due to pna vs acute decompensated HF vs AHRF due to COVID. Less likely from UTI as no complaints of fever, dysuria.   - f/u blood culture, UCx  - Obtain sputum culture if productive cough develops  -f/u  RVP, Covid MRSA swab, ESR CRP, procal, repeat lactat  -s/p CTX 1gr in ED   - Start Vanc, Zosyn  - Continue decadron 6mg x 9 days    #HIV  Hx HIV. Takes Biktarvy regularly. States last viral load was wnl  -F/u viral load, CD4, hepatitis panel  - Continue Biktarvy  -Start PCP ppx if CD4<200    #UTI  Urine: leuk esterase, WBC>10, Bacteria. Denied dysuria. No suprapubic tenderness  - Continue Abx above  -F/u urine cx    MSK  No active issues    PROPHYLAXIS  Fluids: None  Electrolytes: replete as necessary, K>4, Mg>2  Nutrition: NPO since on BIPAP --> DASH  Bowel Regimen: None  DVT ppx: Heparin gtt  GI ppx: Pantoprazole 40mg IV     ACCESS  Code: Full  Disposition: CCU

## 2022-04-04 NOTE — H&P ADULT - NSICDXFAMILYHX_GEN_ALL_CORE_FT
FAMILY HISTORY:  FH: breast cancer  FH: colon cancer  FH: diabetes mellitus    Father  Still living? Unknown  FHx: early MI, Age at diagnosis: Age Unknown

## 2022-04-04 NOTE — H&P ADULT - NSHPSOCIALHISTORY_GEN_ALL_CORE
Lives alone in an apartment, stairs required to get to laundry room. Former smoker pack/week for 3 years, quit after his heart attack Lives alone in an apartment in [facility], stairs required to get to laundry room. Former smoker pack/week for 3 years, quit after his heart attack.  Unemployed. Paints as a hobby, spends time with friends.

## 2022-04-04 NOTE — PATIENT PROFILE ADULT - FALL HARM RISK - HARM RISK INTERVENTIONS

## 2022-04-04 NOTE — H&P ADULT - NSHPPHYSICALEXAM_GEN_ALL_CORE
PHYSICAL EXAM:    Constitutional: female/male, WDWN, NAD  HEENT: PERRL, EOMI, sclera non-icteric, neck supple, trachea midline, no masses, no JVD, MMM, good dentition  Respiratory: CTA b/l, good air entry b/l, no wheezing, no rhonchi, no rales, without accessory muscle use and no intercostal retractions  Cardiovascular: RRR, normal S1S2, no M/R/G  Gastrointestinal: soft, NTND, no masses palpable, BS normal  Extremities: Warm, well perfused, pulses equal bilateral upper and lower extremities, no edema, no clubbing  Neurological: AAOx3, CN Grossly intact  Skin: Normal temperature, warm, dry PHYSICAL EXAM:    Constitutional: Male NAD  ENT: PERRL, EOMI, sclera non-icteric. MMM  Neck: No JVD  Respiratory: Using accessory muscles, difficulty speaking on BiPap 60% 15/8  Cardiovascular: RRR, normal S1S2, no M/R/G  Gastrointestinal: soft, NTND, no masses palpable, BS normal. Colostomy bag present c/d/i.  : Ly draining dark yellow urine. No suprapubic tenderness  Extremities: Cold hands and feet. Weak pulses  Neurological: AAOx3, CN Grossly intact  Skin: Normal temperature, warm, dry  Psych: Calm, pleasant affect PHYSICAL EXAM:    Constitutional: Male NAD  ENT: PERRL, EOMI, sclera non-icteric. MMM  Neck: No JVD  Respiratory: Mechanical breath sounds. Using accessory muscles, difficulty speaking on BiPap 60% 15/8  Cardiovascular: Distant heart sounds.  Gastrointestinal: soft, NTND, no masses palpable, BS normal. Colostomy bag present c/d/i.  : Ly draining dark yellow urine. No suprapubic tenderness  Extremities: Cold hands and feet. Weak pulses. Dark discoloration medial ankles b/l  Neurological: AAOx3, CN Grossly intact  Skin: Normal temperature, warm, dry  Psych: Calm, pleasant affect PHYSICAL EXAM:    Constitutional: Male NAD  ENT: PERRL, EOMI, sclera non-icteric. MMM  Neck: No JVD  Respiratory: Mechanical breath sounds. Using accessory muscles, difficulty speaking on BiPap 60% 15/8  Cardiovascular: Distant heart sounds.  Gastrointestinal: soft, NTND, no masses palpable, BS normal. Colostomy bag present c/d/i.  : Ly draining dark yellow urine. No suprapubic tenderness  Extremities: Cold hands and feet. Weak pulses. Dark discoloration medial ankles b/l. 2+ pitting edema  Neurological: AAOx3, CN Grossly intact  Skin: Normal temperature, warm, dry  Psych: Calm, pleasant affect

## 2022-04-04 NOTE — H&P ADULT - ATTENDING COMMENTS
41M PMH CAD, MI (Jan 2022, stent), Left heart thrombus (Xarelto) CHF, colon cancer (s/p resection 2016), HIV (Biktarvy), former smoker (1pack/week for 3 years) BIBEMS for worsening shortness of breath for two days i/s/o recent cold, admitted to CCU for acute hypoxic respiratory failure likely 2/2 PNA vs acute heart failure exacerbation and severe spesis.  - BiPAP, Lasix gtt, broad spectrum antibiotic, decadron  - Infectious work up

## 2022-04-04 NOTE — H&P ADULT - HISTORY OF PRESENT ILLNESS
CC: "  HPI  Denies fevers, chills, cough, chest pain, dyspnea, nausea, headache, diarrhea, sick contactschange in urinary or bowel habits      In the ED:    - VS: Tmax: , HR:  , BP:  , RR: , O2: %     - Pertinent Labs:     - Imaging: CXR: CT: US: Cath: EKG:     - Treatment/interventions:     PMHx:   PSHx:  Meds: See med rec  Allergies:  Social: see below   41M PMH CAD, MI (2022, stent), Left heart thrombus (Xarelto) CHF, colon cancer (s/p resection ), HIV (Biktarvy), former smoker (1pack/week for 3 years) BIBEMS for worsening shortness of breath for two days. Patient reported a cold with congestion, took cold medicine and woke up with mild SOB that progressed over the next to days. He felt "winded" descending stairs. Endorsed dry cough and orthopnea but denied worsened leg swelling. No sick contacts, or covid exposure, Vaccinated x3. Denied dysuria, changes in ostomy output. Paints as a hobby with mild fume. His father  at age 30 from a heart attack.  [facility] with hypoxia to 60% on RA and fever to 102.5. -32 /93 Placed on BiPap 10/5 FIO2 50%, satting 97% with RR 45. CXR showed bilateral diffuse opacification, enlarged cardiac silhouette hyperinflated lungs with flattened diaphragm. Per ED, EKG sinus tachycardia with left axis deviation. Labs significant for Trop I 170, ,  ProBNP 34,267, wbc 13.87, lactate 3.8, Hb 11.5, VBG with respiratory alkalosis. Given CTX 1g, Tylenol 975mg, decadron 10mg IV, morphine 2mg IV, nitroglycerin drip (5mcg), furosemide 80mv IV push and ramos with 1200cc urine output.  Accepted to CCU for management of acute hypoxic respiratory failure likely i/s/o acute heart failure exacerbation.     In the CCU    - VS: Tmax: 96.3, HR: 109, BP: 147/65 MAP 92, RR: 42, O2: 99% on BiPap     - EKG: Sinus tach. Nonspecific st changes. LAFB. Incomplete RBBB     - Bedside EF appears 20-25 with RMA    - Treatment/interventions: Stopped Nitro. Bipap 60% 15/. Started lasix and heparin gtt    PMHx: See above  PSHx: Stent , Colon resection   Meds: See med rec  Allergies: None  Social: see below   41M PMH CAD, MI (2022, stent), Left heart thrombus (Xarelto) CHF, colon cancer (s/p resection ), HIV (Biktarvy), former smoker (1pack/week for 3 years) BIBEMS for worsening shortness of breath for two days. Patient reported a cold with congestion, took cold medicine and woke up with mild SOB that progressed over the next to days. He felt "winded" descending stairs. Endorsed dry cough and orthopnea but denied worsened leg swelling. No sick contacts, or covid exposure, Vaccinated x3. Denied dysuria, changes in ostomy output. Paints as a hobby with mild fume. His father  at age 30 from a heart attack.  [facility] with hypoxia to 60% on RA and fever to 102.5. -32 /93 Placed on BiPap 10/5 FIO2 50%, satting 97% with RR 45. CXR showed bilateral diffuse opacification, enlarged cardiac silhouette hyperinflated lungs with flattened diaphragm. Per ED, EKG sinus tachycardia with left axis deviation. Labs significant for Trop I 170, ,  ProBNP 34,267, wbc 13.87, lactate 3.8, Hb 11.5, VBG with respiratory alkalosis. Given CTX 1g, Tylenol 975mg, decadron 10mg IV, morphine 2mg IV, nitroglycerin drip (5mcg), furosemide 80mv IV push and ramos with 1200cc urine output.  Accepted to CCU for management of acute hypoxic respiratory failure likely i/s/o acute heart failure exacerbation.     In the CCU    - VS: Tmax: 96.3, HR: 109, BP: 147/65 MAP 92, RR: 42, O2: 99% on BiPap     - EKG: Sinus tach. Nonspecific st changes. LAFB. Incomplete RBBB     - Bedside EF appears 20-25 with RMA    - Treatment/interventions: Stopped Nitro. Bipap 60% 15/8. Started lasix and heparin gtt    PMHx: See above  PSHx: Stent , Colon resection   Meds: atorva 80, biktarvy, plavix, epleronone, escitalopram, entresto  metoprolol succina 25, xarelto 15, torsemide  Allergies: None  Social: see below   41M PMH CAD, MI (2022, stent), Left heart thrombus (Xarelto) CHF, colon cancer (s/p resection ), HIV (Biktarvy), former smoker (1pack/week for 3 years) BIBEMS for worsening shortness of breath for two days. Patient reported a cold with congestion, took cold medicine and woke up with mild SOB that progressed over the next to days. He felt "winded" descending stairs. Endorsed dry cough and orthopnea but denied worsened leg swelling. No sick contacts, or covid exposure, Vaccinated x3. Denied dysuria, changes in ostomy output. Paints as a hobby with mild fume. His father  at age 30 from a heart attack.  [facility] with hypoxia to 60% on RA and fever to 102.5. -32 /93 Placed on BiPap 10/5 FIO2 50%, satting 97% with RR 45. CXR showed bilateral diffuse opacification, enlarged cardiac silhouette hyperinflated lungs with flattened diaphragm. Per ED, EKG sinus tachycardia with left axis deviation. Labs significant for Trop I 170, ,  ProBNP 34,267, wbc 13.87, lactate 3.8, Hb 11.5, VBG with respiratory alkalosis. Given CTX 1g, Tylenol 975mg, decadron 10mg IV, morphine 2mg IV, nitroglycerin drip (5mcg), furosemide 80mv IV push and ramos with 1200cc urine output.  Accepted to CCU for management of acute hypoxic respiratory failure likely i/s/o acute heart failure exacerbation.     In the CCU    - VS: Tmax: 96.3, HR: 109, BP: 147/65 MAP 92, RR: 42, O2: 99% on BiPap     - EKG: Sinus tach. Nonspecific st changes. LAFB. Incomplete RBBB     - Bedside EF appears 20-25 with RMA    - Treatment/interventions: Stopped Nitro. Bipap 60% 15/8. Started lasix and heparin gtt    PMHx: See above  PSHx: Stent , Colon resection   Meds: atorva 80, biktarvy, plavix, epleronone, escitalopram, entresto  metoprolol succina 25, xarelto 15, torsemide  Allergies: None  Social: see below

## 2022-04-05 DIAGNOSIS — R91.8 OTHER NONSPECIFIC ABNORMAL FINDING OF LUNG FIELD: ICD-10-CM

## 2022-04-05 DIAGNOSIS — I50.1 LEFT VENTRICULAR FAILURE, UNSPECIFIED: ICD-10-CM

## 2022-04-05 DIAGNOSIS — J96.01 ACUTE RESPIRATORY FAILURE WITH HYPOXIA: ICD-10-CM

## 2022-04-05 LAB
ALBUMIN SERPL ELPH-MCNC: 2.6 G/DL — LOW (ref 3.3–5)
ALBUMIN SERPL ELPH-MCNC: 2.7 G/DL — LOW (ref 3.3–5)
ALBUMIN SERPL ELPH-MCNC: 2.8 G/DL — LOW (ref 3.3–5)
ALP SERPL-CCNC: 56 U/L — SIGNIFICANT CHANGE UP (ref 40–120)
ALP SERPL-CCNC: 60 U/L — SIGNIFICANT CHANGE UP (ref 40–120)
ALP SERPL-CCNC: 61 U/L — SIGNIFICANT CHANGE UP (ref 40–120)
ALT FLD-CCNC: 67 U/L — HIGH (ref 10–45)
ALT FLD-CCNC: 70 U/L — HIGH (ref 10–45)
ALT FLD-CCNC: 90 U/L — HIGH (ref 10–45)
ANION GAP SERPL CALC-SCNC: 10 MMOL/L — SIGNIFICANT CHANGE UP (ref 5–17)
ANION GAP SERPL CALC-SCNC: 11 MMOL/L — SIGNIFICANT CHANGE UP (ref 5–17)
ANION GAP SERPL CALC-SCNC: 9 MMOL/L — SIGNIFICANT CHANGE UP (ref 5–17)
APTT BLD: 45 SEC — HIGH (ref 27.5–35.5)
APTT BLD: 53.9 SEC — HIGH (ref 27.5–35.5)
AST SERPL-CCNC: 58 U/L — HIGH (ref 10–40)
AST SERPL-CCNC: 62 U/L — HIGH (ref 10–40)
AST SERPL-CCNC: 79 U/L — HIGH (ref 10–40)
BASE EXCESS BLDA CALC-SCNC: 3.9 MMOL/L — HIGH (ref -2–3)
BILIRUB SERPL-MCNC: 0.4 MG/DL — SIGNIFICANT CHANGE UP (ref 0.2–1.2)
BILIRUB SERPL-MCNC: 0.6 MG/DL — SIGNIFICANT CHANGE UP (ref 0.2–1.2)
BILIRUB SERPL-MCNC: 0.6 MG/DL — SIGNIFICANT CHANGE UP (ref 0.2–1.2)
BUN SERPL-MCNC: 22 MG/DL — SIGNIFICANT CHANGE UP (ref 7–23)
BUN SERPL-MCNC: 23 MG/DL — SIGNIFICANT CHANGE UP (ref 7–23)
BUN SERPL-MCNC: 28 MG/DL — HIGH (ref 7–23)
CALCIUM SERPL-MCNC: 7.9 MG/DL — LOW (ref 8.4–10.5)
CALCIUM SERPL-MCNC: 8 MG/DL — LOW (ref 8.4–10.5)
CALCIUM SERPL-MCNC: 8.1 MG/DL — LOW (ref 8.4–10.5)
CHLORIDE SERPL-SCNC: 97 MMOL/L — SIGNIFICANT CHANGE UP (ref 96–108)
CHLORIDE SERPL-SCNC: 98 MMOL/L — SIGNIFICANT CHANGE UP (ref 96–108)
CHLORIDE SERPL-SCNC: 98 MMOL/L — SIGNIFICANT CHANGE UP (ref 96–108)
CO2 BLDA-SCNC: 29 MMOL/L — HIGH (ref 19–24)
CO2 SERPL-SCNC: 25 MMOL/L — SIGNIFICANT CHANGE UP (ref 22–31)
CO2 SERPL-SCNC: 26 MMOL/L — SIGNIFICANT CHANGE UP (ref 22–31)
CO2 SERPL-SCNC: 27 MMOL/L — SIGNIFICANT CHANGE UP (ref 22–31)
CREAT SERPL-MCNC: 1.45 MG/DL — HIGH (ref 0.5–1.3)
CREAT SERPL-MCNC: 1.49 MG/DL — HIGH (ref 0.5–1.3)
CREAT SERPL-MCNC: 1.69 MG/DL — HIGH (ref 0.5–1.3)
CRYPTOC AG FLD QL: NEGATIVE — SIGNIFICANT CHANGE UP
EGFR: 51 ML/MIN/1.73M2 — LOW
EGFR: 59 ML/MIN/1.73M2 — LOW
EGFR: 61 ML/MIN/1.73M2 — SIGNIFICANT CHANGE UP
GAS PNL BLDA: SIGNIFICANT CHANGE UP
GLUCOSE BLDC GLUCOMTR-MCNC: 126 MG/DL — HIGH (ref 70–99)
GLUCOSE BLDC GLUCOMTR-MCNC: 135 MG/DL — HIGH (ref 70–99)
GLUCOSE BLDC GLUCOMTR-MCNC: 140 MG/DL — HIGH (ref 70–99)
GLUCOSE BLDC GLUCOMTR-MCNC: 178 MG/DL — HIGH (ref 70–99)
GLUCOSE SERPL-MCNC: 138 MG/DL — HIGH (ref 70–99)
GLUCOSE SERPL-MCNC: 144 MG/DL — HIGH (ref 70–99)
GLUCOSE SERPL-MCNC: 148 MG/DL — HIGH (ref 70–99)
HBV SURFACE AB SER-ACNC: SIGNIFICANT CHANGE UP
HCO3 BLDA-SCNC: 28 MMOL/L — SIGNIFICANT CHANGE UP (ref 21–28)
HCT VFR BLD CALC: 31.6 % — LOW (ref 39–50)
HCV RNA SPEC NAA+PROBE-LOG IU: SIGNIFICANT CHANGE UP
HCV RNA SPEC NAA+PROBE-LOG IU: SIGNIFICANT CHANGE UP LOGIU/ML
HGB BLD-MCNC: 11 G/DL — LOW (ref 13–17)
HIV-1 VIRAL LOAD RESULT: ABNORMAL
HIV1 RNA # SERPL NAA+PROBE: SIGNIFICANT CHANGE UP
HIV1 RNA SER-IMP: SIGNIFICANT CHANGE UP
HIV1 RNA SERPL NAA+PROBE-ACNC: ABNORMAL
HIV1 RNA SERPL NAA+PROBE-LOG#: 4.71 — SIGNIFICANT CHANGE UP
LACTATE SERPL-SCNC: 1.5 MMOL/L — SIGNIFICANT CHANGE UP (ref 0.5–2)
MAGNESIUM SERPL-MCNC: 2.2 MG/DL — SIGNIFICANT CHANGE UP (ref 1.6–2.6)
MAGNESIUM SERPL-MCNC: 2.4 MG/DL — SIGNIFICANT CHANGE UP (ref 1.6–2.6)
MCHC RBC-ENTMCNC: 28.9 PG — SIGNIFICANT CHANGE UP (ref 27–34)
MCHC RBC-ENTMCNC: 34.8 GM/DL — SIGNIFICANT CHANGE UP (ref 32–36)
MCV RBC AUTO: 83.2 FL — SIGNIFICANT CHANGE UP (ref 80–100)
NRBC # BLD: 0 /100 WBCS — SIGNIFICANT CHANGE UP (ref 0–0)
PCO2 BLDA: 38 MMHG — SIGNIFICANT CHANGE UP (ref 35–48)
PH BLDA: 7.47 — HIGH (ref 7.35–7.45)
PHOSPHATE SERPL-MCNC: 2.6 MG/DL — SIGNIFICANT CHANGE UP (ref 2.5–4.5)
PHOSPHATE SERPL-MCNC: 2.7 MG/DL — SIGNIFICANT CHANGE UP (ref 2.5–4.5)
PLATELET # BLD AUTO: 284 K/UL — SIGNIFICANT CHANGE UP (ref 150–400)
PO2 BLDA: 102 MMHG — SIGNIFICANT CHANGE UP (ref 83–108)
POTASSIUM SERPL-MCNC: 4.2 MMOL/L — SIGNIFICANT CHANGE UP (ref 3.5–5.3)
POTASSIUM SERPL-SCNC: 4.2 MMOL/L — SIGNIFICANT CHANGE UP (ref 3.5–5.3)
PROT SERPL-MCNC: 6.3 G/DL — SIGNIFICANT CHANGE UP (ref 6–8.3)
PROT SERPL-MCNC: 6.4 G/DL — SIGNIFICANT CHANGE UP (ref 6–8.3)
PROT SERPL-MCNC: 6.6 G/DL — SIGNIFICANT CHANGE UP (ref 6–8.3)
RAPID RVP RESULT: SIGNIFICANT CHANGE UP
RBC # BLD: 3.8 M/UL — LOW (ref 4.2–5.8)
RBC # FLD: 16.3 % — HIGH (ref 10.3–14.5)
SAO2 % BLDA: 97 % — SIGNIFICANT CHANGE UP (ref 94–98)
SARS-COV-2 RNA SPEC QL NAA+PROBE: SIGNIFICANT CHANGE UP
SODIUM SERPL-SCNC: 133 MMOL/L — LOW (ref 135–145)
SODIUM SERPL-SCNC: 134 MMOL/L — LOW (ref 135–145)
SODIUM SERPL-SCNC: 134 MMOL/L — LOW (ref 135–145)
WBC # BLD: 16.48 K/UL — HIGH (ref 3.8–10.5)
WBC # FLD AUTO: 16.48 K/UL — HIGH (ref 3.8–10.5)

## 2022-04-05 PROCEDURE — 99223 1ST HOSP IP/OBS HIGH 75: CPT | Mod: GC

## 2022-04-05 PROCEDURE — 99291 CRITICAL CARE FIRST HOUR: CPT

## 2022-04-05 PROCEDURE — 71250 CT THORAX DX C-: CPT | Mod: 26

## 2022-04-05 PROCEDURE — 99223 1ST HOSP IP/OBS HIGH 75: CPT

## 2022-04-05 PROCEDURE — 71045 X-RAY EXAM CHEST 1 VIEW: CPT | Mod: 26

## 2022-04-05 RX ORDER — SODIUM CHLORIDE 9 MG/ML
250 INJECTION INTRAMUSCULAR; INTRAVENOUS; SUBCUTANEOUS ONCE
Refills: 0 | Status: DISCONTINUED | OUTPATIENT
Start: 2022-04-05 | End: 2022-04-05

## 2022-04-05 RX ORDER — ASPIRIN/CALCIUM CARB/MAGNESIUM 324 MG
325 TABLET ORAL ONCE
Refills: 0 | Status: COMPLETED | OUTPATIENT
Start: 2022-04-05 | End: 2022-04-05

## 2022-04-05 RX ORDER — HEPARIN SODIUM 5000 [USP'U]/ML
2300 INJECTION INTRAVENOUS; SUBCUTANEOUS
Qty: 25000 | Refills: 0 | Status: DISCONTINUED | OUTPATIENT
Start: 2022-04-05 | End: 2022-04-05

## 2022-04-05 RX ORDER — METOPROLOL TARTRATE 50 MG
12.5 TABLET ORAL EVERY 12 HOURS
Refills: 0 | Status: DISCONTINUED | OUTPATIENT
Start: 2022-04-05 | End: 2022-04-05

## 2022-04-05 RX ORDER — ASPIRIN/CALCIUM CARB/MAGNESIUM 324 MG
81 TABLET ORAL DAILY
Refills: 0 | Status: DISCONTINUED | OUTPATIENT
Start: 2022-04-06 | End: 2022-04-08

## 2022-04-05 RX ORDER — HEPARIN SODIUM 5000 [USP'U]/ML
2100 INJECTION INTRAVENOUS; SUBCUTANEOUS
Qty: 25000 | Refills: 0 | Status: DISCONTINUED | OUTPATIENT
Start: 2022-04-05 | End: 2022-04-05

## 2022-04-05 RX ORDER — ADENOSINE 3 MG/ML
6 INJECTION INTRAVENOUS ONCE
Refills: 0 | Status: DISCONTINUED | OUTPATIENT
Start: 2022-04-05 | End: 2022-04-05

## 2022-04-05 RX ORDER — METOPROLOL TARTRATE 50 MG
12.5 TABLET ORAL EVERY 12 HOURS
Refills: 0 | Status: DISCONTINUED | OUTPATIENT
Start: 2022-04-05 | End: 2022-04-07

## 2022-04-05 RX ADMIN — CHLORHEXIDINE GLUCONATE 1 APPLICATION(S): 213 SOLUTION TOPICAL at 05:51

## 2022-04-05 RX ADMIN — PANTOPRAZOLE SODIUM 40 MILLIGRAM(S): 20 TABLET, DELAYED RELEASE ORAL at 06:29

## 2022-04-05 RX ADMIN — Medication 265.63 MILLIGRAM(S): at 01:49

## 2022-04-05 RX ADMIN — Medication 265.63 MILLIGRAM(S): at 19:38

## 2022-04-05 RX ADMIN — PIPERACILLIN AND TAZOBACTAM 3.33 GRAM(S): 4; .5 INJECTION, POWDER, LYOPHILIZED, FOR SOLUTION INTRAVENOUS at 16:15

## 2022-04-05 RX ADMIN — Medication 12.5 MILLIGRAM(S): at 10:33

## 2022-04-05 RX ADMIN — Medication 2: at 12:03

## 2022-04-05 RX ADMIN — Medication 265.63 MILLIGRAM(S): at 10:47

## 2022-04-05 RX ADMIN — PIPERACILLIN AND TAZOBACTAM 3.33 GRAM(S): 4; .5 INJECTION, POWDER, LYOPHILIZED, FOR SOLUTION INTRAVENOUS at 21:32

## 2022-04-05 RX ADMIN — Medication 40 MILLIGRAM(S): at 19:18

## 2022-04-05 RX ADMIN — ATORVASTATIN CALCIUM 80 MILLIGRAM(S): 80 TABLET, FILM COATED ORAL at 21:32

## 2022-04-05 RX ADMIN — PIPERACILLIN AND TAZOBACTAM 3.33 GRAM(S): 4; .5 INJECTION, POWDER, LYOPHILIZED, FOR SOLUTION INTRAVENOUS at 09:55

## 2022-04-05 RX ADMIN — CLOPIDOGREL BISULFATE 75 MILLIGRAM(S): 75 TABLET, FILM COATED ORAL at 12:04

## 2022-04-05 RX ADMIN — Medication 325 MILLIGRAM(S): at 12:04

## 2022-04-05 RX ADMIN — Medication 12.5 MILLIGRAM(S): at 19:17

## 2022-04-05 RX ADMIN — PIPERACILLIN AND TAZOBACTAM 3.33 GRAM(S): 4; .5 INJECTION, POWDER, LYOPHILIZED, FOR SOLUTION INTRAVENOUS at 03:31

## 2022-04-05 RX ADMIN — Medication 40 MILLIGRAM(S): at 05:51

## 2022-04-05 RX ADMIN — HEPARIN SODIUM 23 UNIT(S)/HR: 5000 INJECTION INTRAVENOUS; SUBCUTANEOUS at 07:44

## 2022-04-05 RX ADMIN — BICTEGRAVIR SODIUM, EMTRICITABINE, AND TENOFOVIR ALAFENAMIDE FUMARATE 1 TABLET(S): 30; 120; 15 TABLET ORAL at 12:04

## 2022-04-05 NOTE — CONSULT NOTE ADULT - TIME BILLING
Patient seen and examined with house-staff during bedside rounds.  Resident note read, including vitals, physical findings, laboratory data, and radiological reports.   Revisions included below.  Direct personal management at bed side and extensive interpretation of the data.  Plan was outlined and discussed in details with the housestaff.  Decision making of high complexity  Action taken for acute disease activity to reflect the level of care provided:  - medication reconciliation  - review laboratory data  Patient was admitted with acute cardiogenic pulmonary edema.  Patient might have underlying PCP but the clinical picture upon admission is consistent with fluid overload rather than PCP.  Continue Bactrim..  Continue prednisone.  We will follow-up with the bronchoscopy and if is negative for PCP then we can discontinue steroids and change to Bactrim prophylaxis.  Continue management per cardiology.  Will need cardiology clearance for his underlying cardiac status for the bronchoscopy

## 2022-04-05 NOTE — CONSULT NOTE ADULT - PROBLEM/RECOMMENDATION-3
Patient: Joon Granados    Procedure Summary     Date: 02/20/21 Room / Location: [facility] OR [address] [place] [address] MAIN OR    Anesthesia Start: 1620 Anesthesia Stop: 1843    Procedures:       ANKLE EXTERNAL FIXATOR APPLICATION (Right Ankle)      INCISION AND DRAINAGE RIGHT ANKLE WOUND (Right ) Diagnosis:     Surgeon: Sami Cortés Jr., MD Provider: Shan Kay MD    Anesthesia Type: general ASA Status: 4          Anesthesia Type: general    Vitals  Vitals Value Taken Time   /89 02/20/21 1945   Temp 36.4 °C (97.5 °F) 02/20/21 1839   Pulse 69 02/20/21 1950   Resp 14 02/20/21 1945   SpO2 87 % 02/20/21 1950   Vitals shown include unvalidated device data.        Post Anesthesia Care and Evaluation    Patient location during evaluation: bedside  Patient participation: complete - patient participated  Level of consciousness: awake  Pain management: adequate  Airway patency: patent  Anesthetic complications: No anesthetic complications    Cardiovascular status: acceptable  Respiratory status: acceptable  Hydration status: acceptable       DISPLAY PLAN FREE TEXT

## 2022-04-05 NOTE — CONSULT NOTE ADULT - SUBJECTIVE AND OBJECTIVE BOX
PULMONARY SERVICE INITIAL CONSULT NOTE    HPI:  41M PMH CAD, MI (2022, stent), Left heart thrombus (Xarelto) CHF, colon cancer (s/p resection ), HIV (Biktarvy), former smoker (1pack/week for 3 years) BIBEMS for worsening shortness of breath for two days. Patient reported a cold with congestion, took cold medicine and woke up with mild SOB that progressed over the next to days. He felt "winded" descending stairs. Endorsed dry cough and orthopnea but denied worsened leg swelling. No sick contacts, or covid exposure, Vaccinated x3. Denied dysuria, changes in ostomy output. Paints as a hobby with mild fume. His father  at age 30 from a heart attack.  [facility] with hypoxia to 60% on RA and fever to 102.5. -32 /93 Placed on BiPap 10/5 FIO2 50%, satting 97% with RR 45. CXR showed bilateral diffuse opacification, enlarged cardiac silhouette hyperinflated lungs with flattened diaphragm. Per ED, EKG sinus tachycardia with left axis deviation. Labs significant for Trop I 170, ,  ProBNP 34,267, wbc 13.87, lactate 3.8, Hb 11.5, VBG with respiratory alkalosis. Given CTX 1g, Tylenol 975mg, decadron 10mg IV, morphine 2mg IV, nitroglycerin drip (5mcg), furosemide 80mv IV push and ramos with 1200cc urine output.  Accepted to CCU for management of acute hypoxic respiratory failure likely i/s/o acute heart failure exacerbation.     In the CCU    - VS: Tmax: 96.3, HR: 109, BP: 147/65 MAP 92, RR: 42, O2: 99% on BiPap     - EKG: Sinus tach. Nonspecific st changes. LAFB. Incomplete RBBB     - Bedside EF appears 20-25 with RMA    - Treatment/interventions: Stopped Nitro. Bipap 60% 15/8. Started lasix and heparin gtt    PMHx: See above  PSHx: Stent , Colon resection   Meds: atorva 80, biktarvy, plavix, epleronone, escitalopram, entresto  metoprolol succina 25, xarelto 15, torsemide  Allergies: None  Social: see below   (2022 03:08)      REVIEW OF SYSTEMS:  Constitutional: No fever, weight loss or fatigue  Eyes: No eye pain, visual disturbances, or discharge  ENMT:  No difficulty hearing, tinnitus, vertigo; No sinus or throat pain  Neck: No pain, stiffness or neck swelling  Respiratory: see HPI  Cardiovascular: No chest pain, palpitations, dizziness or leg swelling  Gastrointestinal: No abdominal or epigastric pain. No nausea, vomiting or hematemesis; No diarrhea or constipation. No melena or hematochezia.  Genitourinary: No dysuria, frequency, hematuria or incontinence  Neurological: No headaches, memory loss, loss of strength, numbness or tremors  Skin: No itching, burning, rashes or lesions   Lymph Nodes: No enlarged glands  Endocrine: No heat or cold intolerance; No hair loss  Musculoskeletal: No joint pain or swelling; No muscle, back or extremity pain  Psychiatric: No depression, anxiety, mood swings or difficulty sleeping  Heme/Lymph: No easy bruising or bleeding gums  Allergy and Immunologic: No hives or eczema    PAST MEDICAL & SURGICAL HISTORY:  Acute myocardial infarction  Acute on chronic systolic congestive heart failure  HIV disease  Colon cancer  Left heart thrombus  on xarelto    FAMILY HISTORY:  FHx: early MI (Father)  FH: colon cancer  FH: breast cancer  FH: diabetes mellitus    SOCIAL HISTORY:  Smoking Status: [ ] Current, [ ] Former, [ ] Never  Pack Years:    MEDICATIONS:  Pulmonary:    Antimicrobials:  bictegravir 50 mG/emtricitabine 200 mG/tenofovir alafenamide 25 mG (BIKTARVY) 1 Tablet(s) Oral daily  piperacillin/tazobactam IVPB.. 3.375 Gram(s) IV Intermittent every 6 hours  trimethoprim / sulfamethoxazole IVPB 500 milliGRAM(s) IV Intermittent every 8 hours    Anticoagulants:  clopidogrel Tablet 75 milliGRAM(s) Oral daily    Onc:    GI/:  pantoprazole    Tablet 40 milliGRAM(s) Oral before breakfast    Endocrine:  atorvastatin 80 milliGRAM(s) Oral at bedtime  dextrose 50% Injectable 25 Gram(s) IV Push once  dextrose 50% Injectable 12.5 Gram(s) IV Push once  dextrose 50% Injectable 25 Gram(s) IV Push once  dextrose Oral Gel 15 Gram(s) Oral once PRN  glucagon  Injectable 1 milliGRAM(s) IntraMuscular once  insulin lispro (ADMELOG) corrective regimen sliding scale   SubCutaneous Before meals and at bedtime  predniSONE   Tablet 40 milliGRAM(s) Oral every 12 hours    Cardiac:    Other Medications:  chlorhexidine 2% Cloths 1 Application(s) Topical <User Schedule>  dextrose 5%. 1000 milliLiter(s) IV Continuous <Continuous>  dextrose 5%. 1000 milliLiter(s) IV Continuous <Continuous>    Allergies  No Known Allergies    Vital Signs Last 24 Hrs  T(C): 36.8 (2022 05:56), Max: 36.8 (2022 05:56)  T(F): 98.2 (2022 05:56), Max: 98.2 (2022 05:56)  HR: 87 (2022 09:00) (78 - 94)  BP: 92/62 (2022 09:00) (83/48 - 108/68)  BP(mean): 72 (2022 09:00) (60 - 83)  RR: 21 (2022 09:00) (14 - 36)  SpO2: 94% (2022 09:00) (91% - 97%)     @ 07:  -  05 @ 07:00  --------------------------------------------------------  IN: 2733 mL / OUT: 2110 mL / NET: 623 mL     @ 07:  -  05 @ 09:28  --------------------------------------------------------  IN: 23 mL / OUT: 135 mL / NET: -112 mL    PHYSICAL EXAM:  Constitutional: WD  Head: NC/AT  EENT: PERRL, anicteric sclera; oropharynx clear, MMM  Neck: supple, no appreciable JVD  Respiratory: CTA B/L; no W/R/R  Cardiovascular: +S1/S2, RRR  Gastrointestinal: soft, NT/ND  Extremities: WWP; no edema, clubbing or cyanosis  Vascular: 2+ radial and pedal pulses  Neurological: AAOx3; no focal deficits    LABS:  ABG - ( 2022 20:06 )  pH, Arterial: 7.46  pH, Blood: x     /  pCO2: 40    /  pO2: 100   / HCO3: 28    / Base Excess: 4.2   /  SaO2: 97.1      CBC Full  -  ( 2022 05:49 )  WBC Count : 16.48 K/uL  RBC Count : 3.80 M/uL  Hemoglobin : 11.0 g/dL  Hematocrit : 31.6 %  Platelet Count - Automated : 284 K/uL  Mean Cell Volume : 83.2 fl  Mean Cell Hemoglobin : 28.9 pg  Mean Cell Hemoglobin Concentration : 34.8 gm/dL  Auto Neutrophil # : x  Auto Lymphocyte # : x  Auto Monocyte # : x  Auto Eosinophil # : x  Auto Basophil # : x  Auto Neutrophil % : x  Auto Lymphocyte % : x  Auto Monocyte % : x  Auto Eosinophil % : x  Auto Basophil % : x    -    134<L>  |  98  |  23  ----------------------------<  148<H>  4.2   |  26  |  1.49<H>    Ca    8.0<L>      2022 05:49  Phos  2.7     04-05  Mg     2.2     04-05    TPro  6.6  /  Alb  2.6<L>  /  TBili  0.6  /  DBili  x   /  AST  58<H>  /  ALT  70<H>  /  AlkPhos  60  04-05    PT/INR - ( 2022 03:29 )   PT: 21.8 sec;   INR: 1.82       PTT - ( 2022 05:49 )  PTT:53.9 sec    Urinalysis Basic - ( 2022 02:44 )    Color: Yellow / Appearance: Clear / S.020 / pH: x  Gluc: x / Ketone: NEGATIVE  / Bili: Negative / Urobili: 1.0 E.U./dL   Blood: x / Protein: NEGATIVE mg/dL / Nitrite: NEGATIVE   Leuk Esterase: Small / RBC: 5-10 /HPF / WBC > 10 /HPF   Sq Epi: x / Non Sq Epi: 0-5 /HPF / Bacteria: Present /HPF    RADIOLOGY & ADDITIONAL STUDIES: PULMONARY SERVICE INITIAL CONSULT NOTE    HPI:  41M PMH CAD, MI (2022, stent), Left heart thrombus (Xarelto), CHF, colon cancer (s/p resection , and chemotherapy, no complications), HIV (Biktarvy, pt defines himself as very compliant), former smoker (1pack/week for 3 years), BIBEMS for worsening shortness of breath for two days. Patient reported a cold with congestion, took cold medicine and woke up with mild SOB that progressed over the next to days. He felt "winded" descending stairs. Endorsed dry cough and orthopnea but denied worsened leg swelling. No sick contacts, or Covid exposure, Vaccinated x3. No animals, no travels. Denied dysuria, changes in ostomy output. Paints as a hobby with mild fume. His father  at age 30 from a heart attack.  [facility] with hypoxia to 60% on RA and fever to 102.5. -32 /93 Placed on BiPap 10/5 FIO2 50%, satting 97% with RR 45. CXR showed bilateral diffuse opacification, enlarged cardiac silhouette hyperinflated lungs with flattened diaphragm. Per ED, EKG sinus tachycardia with left axis deviation. Labs significant for Trop I 170, ,  ProBNP 34,267, wbc 13.87, lactate 3.8, Hb 11.5, VBG with respiratory alkalosis. Given CTX 1g, Tylenol 975mg, decadron 10mg IV, morphine 2mg IV, nitroglycerin drip (5mcg), furosemide 80mv IV push and ramos with 1200cc urine output.  Accepted to CCU for management of acute hypoxic respiratory failure likely i/s/o acute heart failure exacerbation.     In the CCU    - VS: Tmax: 96.3, HR: 109, BP: 147/65 MAP 92, RR: 42, O2: 99% on BiPap     - EKG: Sinus tach. Nonspecific st changes. LAFB. Incomplete RBBB    - Bedside EF appears 20-25 with RMA    - Treatment/interventions: Stopped Nitro. Bipap 60% 15/8. Started lasix and heparin gtt    PMHx: See above  PSHx: Stent , Colon resection   Meds: atorvastatin 80, Biktarvy, Plavix, epleronone, escitalopram, Entresto  metoprolol succinate 25, Xarelto 15, torsemide  Allergies: None  Social: see below   (2022 03:08)    REVIEW OF SYSTEMS:  Constitutional: No fever, weight loss or fatigue  Eyes: No eye pain, visual disturbances, or discharge  ENMT:  No difficulty hearing, tinnitus, vertigo; No sinus or throat pain  Neck: No pain, stiffness or neck swelling  Respiratory: see HPI  Cardiovascular: No chest pain, palpitations, dizziness or leg swelling  Gastrointestinal: No abdominal or epigastric pain. No nausea, vomiting or hematemesis; No diarrhea or constipation. No melena or hematochezia.  Genitourinary: No dysuria, frequency, hematuria or incontinence  Neurological: No headaches, memory loss, loss of strength, numbness or tremors  Skin: No itching, burning, rashes or lesions   Lymph Nodes: No enlarged glands  Endocrine: No heat or cold intolerance; No hair loss  Musculoskeletal: No joint pain or swelling; No muscle, back or extremity pain  Psychiatric: No depression, anxiety, mood swings or difficulty sleeping  Heme/Lymph: No easy bruising or bleeding gums  Allergy and Immunologic: No hives or eczema    PAST MEDICAL & SURGICAL HISTORY:  Acute myocardial infarction  Acute on chronic systolic congestive heart failure  HIV disease  Colon cancer  Left heart thrombus  on xarelto    FAMILY HISTORY:  FHx: early MI (Father)  FH: colon cancer  FH: breast cancer  FH: diabetes mellitus    SOCIAL HISTORY:  Smoking Status: [ ] Current, [ ] Former, [ ] Never  Pack Years:    MEDICATIONS:  Pulmonary:    Antimicrobials:  bictegravir 50 mG/emtricitabine 200 mG/tenofovir alafenamide 25 mG (BIKTARVY) 1 Tablet(s) Oral daily  piperacillin/tazobactam IVPB.. 3.375 Gram(s) IV Intermittent every 6 hours  trimethoprim / sulfamethoxazole IVPB 500 milliGRAM(s) IV Intermittent every 8 hours    Anticoagulants:  clopidogrel Tablet 75 milliGRAM(s) Oral daily    Onc:    GI/:  pantoprazole    Tablet 40 milliGRAM(s) Oral before breakfast    Endocrine:  atorvastatin 80 milliGRAM(s) Oral at bedtime  dextrose 50% Injectable 25 Gram(s) IV Push once  dextrose 50% Injectable 12.5 Gram(s) IV Push once  dextrose 50% Injectable 25 Gram(s) IV Push once  dextrose Oral Gel 15 Gram(s) Oral once PRN  glucagon  Injectable 1 milliGRAM(s) IntraMuscular once  insulin lispro (ADMELOG) corrective regimen sliding scale   SubCutaneous Before meals and at bedtime  predniSONE   Tablet 40 milliGRAM(s) Oral every 12 hours    Cardiac:    Other Medications:  chlorhexidine 2% Cloths 1 Application(s) Topical <User Schedule>  dextrose 5%. 1000 milliLiter(s) IV Continuous <Continuous>  dextrose 5%. 1000 milliLiter(s) IV Continuous <Continuous>    Allergies  No Known Allergies    Vital Signs Last 24 Hrs  T(C): 36.8 (2022 05:56), Max: 36.8 (2022 05:56)  T(F): 98.2 (2022 05:56), Max: 98.2 (2022 05:56)  HR: 87 (2022 09:00) (78 - 94)  BP: 92/62 (2022 09:00) (83/48 - 108/68)  BP(mean): 72 (2022 09:00) (60 - 83)  RR: 21 (2022 09:00) (14 - 36)  SpO2: 94% (2022 09:00) (91% - 97%)     @ 07:  -  05 @ 07:00  --------------------------------------------------------  IN: 2733 mL / OUT: 2110 mL / NET: 623 mL     @ 07: @ 09:28  --------------------------------------------------------  IN: 23 mL / OUT: 135 mL / NET: -112 mL    PHYSICAL EXAM:  Constitutional: WD  Head: NC/AT  EENT: PERRL, anicteric sclera; oropharynx clear, MMM  Neck: supple, no appreciable JVD  Respiratory: CTA B/L; no W/R/R  Cardiovascular: +S1/S2, RRR  Gastrointestinal: soft, NT/ND  Extremities: WWP; mild pitting edema, clubbing or cyanosis  Vascular: 2+ radial and pedal pulses  Neurological: AAOx3; no focal deficits    LABS:  ABG - ( 2022 20:06 )  pH, Arterial: 7.46  pH, Blood: x     /  pCO2: 40    /  pO2: 100   / HCO3: 28    / Base Excess: 4.2   /  SaO2: 97.1      CBC Full  -  ( 2022 05:49 )  WBC Count : 16.48 K/uL  RBC Count : 3.80 M/uL  Hemoglobin : 11.0 g/dL  Hematocrit : 31.6 %  Platelet Count - Automated : 284 K/uL  Mean Cell Volume : 83.2 fl  Mean Cell Hemoglobin : 28.9 pg  Mean Cell Hemoglobin Concentration : 34.8 gm/dL  Auto Neutrophil # : x  Auto Lymphocyte # : x  Auto Monocyte # : x  Auto Eosinophil # : x  Auto Basophil # : x  Auto Neutrophil % : x  Auto Lymphocyte % : x  Auto Monocyte % : x  Auto Eosinophil % : x  Auto Basophil % : x    04-05    134<L>  |  98  |  23  ----------------------------<  148<H>  4.2   |  26  |  1.49<H>    Ca    8.0<L>      2022 05:49  Phos  2.7     04-05  Mg     2.2     04-05    TPro  6.6  /  Alb  2.6<L>  /  TBili  0.6  /  DBili  x   /  AST  58<H>  /  ALT  70<H>  /  AlkPhos  60  04-05    PT/INR - ( 2022 03:29 )   PT: 21.8 sec;   INR: 1.82       PTT - ( 2022 05:49 )  PTT:53.9 sec    Urinalysis Basic - ( 2022 02:44 )    Color: Yellow / Appearance: Clear / S.020 / pH: x  Gluc: x / Ketone: NEGATIVE  / Bili: Negative / Urobili: 1.0 E.U./dL   Blood: x / Protein: NEGATIVE mg/dL / Nitrite: NEGATIVE   Leuk Esterase: Small / RBC: 5-10 /HPF / WBC > 10 /HPF   Sq Epi: x / Non Sq Epi: 0-5 /HPF / Bacteria: Present /HPF    RADIOLOGY & ADDITIONAL STUDIES: PULMONARY SERVICE INITIAL CONSULT NOTE    HPI:  41M PMH CAD, MI (2022, stent), Left heart thrombus (Xarelto), CHF, colon cancer (s/p resection , and chemotherapy, no complications), HIV (Biktarvy, pt defines himself as very compliant), former smoker (1pack/week for 3 years), BIBEMS for worsening shortness of breath for two days. Patient reported a cold with congestion, took cold medicine and woke up with mild SOB that progressed over the next to days. He felt "winded" descending stairs. Endorsed dry cough and orthopnea but denied worsened leg swelling. No sick contacts, or Covid exposure, Vaccinated x3. No animals, no travels. Denied dysuria, changes in ostomy output. Paints as a hobby with mild fume. His father  at age 30 from a heart attack.  [facility] with hypoxia to 60% on RA and fever to 102.5. -32 /93 Placed on BiPap 10/5 FIO2 50%, satting 97% with RR 45. CXR showed bilateral diffuse opacification, enlarged cardiac silhouette hyperinflated lungs with flattened diaphragm. Per ED, EKG sinus tachycardia with left axis deviation. Labs significant for Trop I 170, ,  ProBNP 34,267, wbc 13.87, lactate 3.8, Hb 11.5, VBG with respiratory alkalosis. Given CTX 1g, Tylenol 975mg, decadron 10mg IV, morphine 2mg IV, nitroglycerin drip (5mcg), furosemide 80mv IV push and ramos with 1200cc urine output.  Accepted to CCU for management of acute hypoxic respiratory failure likely i/s/o acute heart failure exacerbation.     In the CCU    - VS: Tmax: 96.3, HR: 109, BP: 147/65 MAP 92, RR: 42, O2: 99% on BiPap     - EKG: Sinus tach. Nonspecific st changes. LAFB. Incomplete RBBB    - Bedside EF appears 20-25 with RMA    - Treatment/interventions: Stopped Nitro. Bipap 60% 15/8. Started lasix and heparin gtt    PMHx: See above  PSHx: Stent , Colon resection   Meds: atorvastatin 80, Biktarvy, Plavix, epleronone, escitalopram, Entresto  metoprolol succinate 25, Xarelto 15, torsemide  Allergies: None  Social: see below   (2022 03:08)    REVIEW OF SYSTEMS:  Constitutional: No fever, weight loss or fatigue  Eyes: No eye pain, visual disturbances, or discharge  ENMT:  No difficulty hearing, tinnitus, vertigo; No sinus or throat pain  Neck: No pain, stiffness or neck swelling  Respiratory: see HPI  Cardiovascular: No chest pain, palpitations, dizziness or leg swelling  Gastrointestinal: No abdominal or epigastric pain. No nausea, vomiting or hematemesis; No diarrhea or constipation. No melena or hematochezia.  Genitourinary: No dysuria, frequency, hematuria or incontinence  Neurological: No headaches, memory loss, loss of strength, numbness or tremors  Skin: No itching, burning, rashes or lesions   Lymph Nodes: No enlarged glands  Endocrine: No heat or cold intolerance; No hair loss  Musculoskeletal: No joint pain or swelling; No muscle, back or extremity pain  Psychiatric: No depression, anxiety, mood swings or difficulty sleeping  Heme/Lymph: No easy bruising or bleeding gums  Allergy and Immunologic: No hives or eczema    PAST MEDICAL & SURGICAL HISTORY:  Acute myocardial infarction  Acute on chronic systolic congestive heart failure  HIV disease  Colon cancer  Left heart thrombus  on xarelto    FAMILY HISTORY:  FHx: early MI (Father)  FH: colon cancer  FH: breast cancer  FH: diabetes mellitus    SOCIAL HISTORY:  Smoking Status: [ ] Current, [ ] Former, [ ] Never  Pack Years:    MEDICATIONS:  Pulmonary:    Antimicrobials:  bictegravir 50 mG/emtricitabine 200 mG/tenofovir alafenamide 25 mG (BIKTARVY) 1 Tablet(s) Oral daily  piperacillin/tazobactam IVPB.. 3.375 Gram(s) IV Intermittent every 6 hours  trimethoprim / sulfamethoxazole IVPB 500 milliGRAM(s) IV Intermittent every 8 hours    Anticoagulants:  clopidogrel Tablet 75 milliGRAM(s) Oral daily    Onc:    GI/:  pantoprazole    Tablet 40 milliGRAM(s) Oral before breakfast    Endocrine:  atorvastatin 80 milliGRAM(s) Oral at bedtime  dextrose 50% Injectable 25 Gram(s) IV Push once  dextrose 50% Injectable 12.5 Gram(s) IV Push once  dextrose 50% Injectable 25 Gram(s) IV Push once  dextrose Oral Gel 15 Gram(s) Oral once PRN  glucagon  Injectable 1 milliGRAM(s) IntraMuscular once  insulin lispro (ADMELOG) corrective regimen sliding scale   SubCutaneous Before meals and at bedtime  predniSONE   Tablet 40 milliGRAM(s) Oral every 12 hours    Cardiac:    Other Medications:  chlorhexidine 2% Cloths 1 Application(s) Topical <User Schedule>  dextrose 5%. 1000 milliLiter(s) IV Continuous <Continuous>  dextrose 5%. 1000 milliLiter(s) IV Continuous <Continuous>    Allergies  No Known Allergies    Vital Signs Last 24 Hrs  T(C): 36.8 (2022 05:56), Max: 36.8 (2022 05:56)  T(F): 98.2 (2022 05:56), Max: 98.2 (2022 05:56)  HR: 87 (2022 09:00) (78 - 94)  BP: 92/62 (2022 09:00) (83/48 - 108/68)  BP(mean): 72 (2022 09:00) (60 - 83)  RR: 21 (2022 09:00) (14 - 36)  SpO2: 94% (2022 09:00) (91% - 97%)     @ 07:  -  05 @ 07:00  --------------------------------------------------------  IN: 2733 mL / OUT: 2110 mL / NET: 623 mL     @ 07: @ 09:28  --------------------------------------------------------  IN: 23 mL / OUT: 135 mL / NET: -112 mL    PHYSICAL EXAM:  Constitutional: WD  Head: NC/AT  EENT: PERRL, anicteric sclera; oropharynx clear, MMM  Neck: supple, no appreciable JVD  Respiratory: CTA B/L; no W/R/R; HFNC 40/40  Cardiovascular: +S1/S2, RRR  Gastrointestinal: soft, NT/ND  Extremities: WWP; mild pitting edema, clubbing or cyanosis  Vascular: 2+ radial and pedal pulses  Neurological: AAOx3; no focal deficits    LABS:  ABG - ( 2022 20:06 )  pH, Arterial: 7.46  pH, Blood: x     /  pCO2: 40    /  pO2: 100   / HCO3: 28    / Base Excess: 4.2   /  SaO2: 97.1      CBC Full  -  ( 2022 05:49 )  WBC Count : 16.48 K/uL  RBC Count : 3.80 M/uL  Hemoglobin : 11.0 g/dL  Hematocrit : 31.6 %  Platelet Count - Automated : 284 K/uL  Mean Cell Volume : 83.2 fl  Mean Cell Hemoglobin : 28.9 pg  Mean Cell Hemoglobin Concentration : 34.8 gm/dL  Auto Neutrophil # : x  Auto Lymphocyte # : x  Auto Monocyte # : x  Auto Eosinophil # : x  Auto Basophil # : x  Auto Neutrophil % : x  Auto Lymphocyte % : x  Auto Monocyte % : x  Auto Eosinophil % : x  Auto Basophil % : x    04-    134<L>  |  98  |  23  ----------------------------<  148<H>  4.2   |  26  |  1.49<H>    Ca    8.0<L>      2022 05:49  Phos  2.7     04-05  Mg     2.2     04-05    TPro  6.6  /  Alb  2.6<L>  /  TBili  0.6  /  DBili  x   /  AST  58<H>  /  ALT  70<H>  /  AlkPhos  60  04-05    PT/INR - ( 2022 03:29 )   PT: 21.8 sec;   INR: 1.82       PTT - ( 2022 05:49 )  PTT:53.9 sec    Urinalysis Basic - ( 2022 02:44 )    Color: Yellow / Appearance: Clear / S.020 / pH: x  Gluc: x / Ketone: NEGATIVE  / Bili: Negative / Urobili: 1.0 E.U./dL   Blood: x / Protein: NEGATIVE mg/dL / Nitrite: NEGATIVE   Leuk Esterase: Small / RBC: 5-10 /HPF / WBC > 10 /HPF   Sq Epi: x / Non Sq Epi: 0-5 /HPF / Bacteria: Present /HPF    RADIOLOGY & ADDITIONAL STUDIES: PULMONARY SERVICE INITIAL CONSULT NOTE    HPI:  This is a 41M with PMH CAD, MI (2022 s/p GARFIELD), left heart thrombus (on Xarelto), HFrEF, colon cancer (s/p resection , and chemotherapy, no complications), HIV on HAART who presents for 2 days of shortness of breath. Patient reported a cold with congestion, took cold medicine and woke up with mild SOB that progressed over the next to days. He felt "winded" descending stairs. Endorsed dry cough and orthopnea but denied worsened leg swelling. No sick contacts, no Covid exposure, Vaccinated x3. No animals, no travels. Denied dysuria, changes in ostomy output. Paints as a hobby with mild fume. His father  at age 30 from a heart attack. Patient was admitted for hypoxemic respiratory failure requiring BIPAP and HFNC. CXR concerning for pulmonary edema with a BNP of 34k. Patient was admitted to CCU for lasix gtt and nitroglycerin gtt and has since had improvement in respiratory status. CD4 noted to be <200 and given fevers and abnormal CXR, pulmonology consulted for concern for PCP pneumonia.    REVIEW OF SYSTEMS:  Constitutional: No fever, weight loss or fatigue  Eyes: No eye pain, visual disturbances, or discharge  ENMT:  No difficulty hearing, tinnitus, vertigo; No sinus or throat pain  Neck: No pain, stiffness or neck swelling  Respiratory: see HPI  Cardiovascular: No chest pain, palpitations, dizziness or leg swelling  Gastrointestinal: No abdominal or epigastric pain. No nausea, vomiting or hematemesis; No diarrhea or constipation. No melena or hematochezia.  Genitourinary: No dysuria, frequency, hematuria or incontinence  Neurological: No headaches, memory loss, loss of strength, numbness or tremors  Skin: No itching, burning, rashes or lesions   Lymph Nodes: No enlarged glands  Endocrine: No heat or cold intolerance; No hair loss  Musculoskeletal: No joint pain or swelling; No muscle, back or extremity pain  Psychiatric: No depression, anxiety, mood swings or difficulty sleeping  Heme/Lymph: No easy bruising or bleeding gums  Allergy and Immunologic: No hives or eczema    PAST MEDICAL & SURGICAL HISTORY:  Acute myocardial infarction  Acute on chronic systolic congestive heart failure  HIV disease  Colon cancer  Left heart thrombus  on xarelto    FAMILY HISTORY:  FHx: early MI (Father)  FH: colon cancer  FH: breast cancer  FH: diabetes mellitus    SOCIAL HISTORY:  Smoking Status: [ ] Current, [X] Former, [ ] Never  Pack Years: 3 years x1 pack per week smoker, quit 2022    MEDICATIONS:    Antimicrobials:  bictegravir 50 mG/emtricitabine 200 mG/tenofovir alafenamide 25 mG (BIKTARVY) 1 Tablet(s) Oral daily  piperacillin/tazobactam IVPB.. 3.375 Gram(s) IV Intermittent every 6 hours  trimethoprim / sulfamethoxazole IVPB 500 milliGRAM(s) IV Intermittent every 8 hours    Anticoagulants:  clopidogrel Tablet 75 milliGRAM(s) Oral daily    GI/:  pantoprazole    Tablet 40 milliGRAM(s) Oral before breakfast    Endocrine:  atorvastatin 80 milliGRAM(s) Oral at bedtime  dextrose 50% Injectable 25 Gram(s) IV Push once  dextrose 50% Injectable 12.5 Gram(s) IV Push once  dextrose 50% Injectable 25 Gram(s) IV Push once  dextrose Oral Gel 15 Gram(s) Oral once PRN  glucagon  Injectable 1 milliGRAM(s) IntraMuscular once  insulin lispro (ADMELOG) corrective regimen sliding scale   SubCutaneous Before meals and at bedtime  predniSONE   Tablet 40 milliGRAM(s) Oral every 12 hours    Other Medications:  chlorhexidine 2% Cloths 1 Application(s) Topical <User Schedule>  dextrose 5%. 1000 milliLiter(s) IV Continuous <Continuous>  dextrose 5%. 1000 milliLiter(s) IV Continuous <Continuous>    Allergies  No Known Allergies    Vital Signs Last 24 Hrs  T(C): 36.8 (2022 05:56), Max: 36.8 (2022 05:56)  T(F): 98.2 (2022 05:56), Max: 98.2 (2022 05:56)  HR: 87 (2022 09:00) (78 - 94)  BP: 92/62 (2022 09:00) (83/48 - 108/68)  BP(mean): 72 (2022 09:00) (60 - 83)  RR: 21 (2022 09:00) (14 - 36)  SpO2: 94% (2022 09:00) (91% - 97%)    -04 @ 07:01  -  04-05 @ 07:00  --------------------------------------------------------  IN: 2733 mL / OUT: 2110 mL / NET: 623 mL     @ 07:01  -   @ 09:28  --------------------------------------------------------  IN: 23 mL / OUT: 135 mL / NET: -112 mL    PHYSICAL EXAM:  Constitutional: WD, comfortable on HFNC  Head: NC/AT  EENT: anicteric sclera; oropharynx clear, MMM  Neck: supple, no appreciable JVD  Respiratory: bilateral crackles worse at bases; no W/R  Cardiovascular: +S1/S2, regular tachycardia  Gastrointestinal: soft, NT/ND  Extremities: WWP; trace pitting edema, clubbing or cyanosis  Vascular: 2+ radial and pedal pulses  Neurological: alert, oriented    LABS:  ABG - ( 2022 20:06 )  pH, Arterial: 7.46  pH, Blood: x     /  pCO2: 40    /  pO2: 100   / HCO3: 28    / Base Excess: 4.2   /  SaO2: 97.1      CBC Full  -  ( 2022 05:49 )  WBC Count : 16.48 K/uL  RBC Count : 3.80 M/uL  Hemoglobin : 11.0 g/dL  Hematocrit : 31.6 %  Platelet Count - Automated : 284 K/uL  Mean Cell Volume : 83.2 fl  Mean Cell Hemoglobin : 28.9 pg  Mean Cell Hemoglobin Concentration : 34.8 gm/dL  Auto Neutrophil # : x  Auto Lymphocyte # : x  Auto Monocyte # : x  Auto Eosinophil # : x  Auto Basophil # : x  Auto Neutrophil % : x  Auto Lymphocyte % : x  Auto Monocyte % : x  Auto Eosinophil % : x  Auto Basophil % : x        134<L>  |  98  |  23  ----------------------------<  148<H>  4.2   |  26  |  1.49<H>    Ca    8.0<L>      2022 05:49  Phos  2.7     04-05  Mg     2.2     04-05    TPro  6.6  /  Alb  2.6<L>  /  TBili  0.6  /  DBili  x   /  AST  58<H>  /  ALT  70<H>  /  AlkPhos  60  04-05    PT/INR - ( 2022 03:29 )   PT: 21.8 sec;   INR: 1.82       PTT - ( 2022 05:49 )  PTT:53.9 sec    Urinalysis Basic - ( 2022 02:44 )    Color: Yellow / Appearance: Clear / S.020 / pH: x  Gluc: x / Ketone: NEGATIVE  / Bili: Negative / Urobili: 1.0 E.U./dL   Blood: x / Protein: NEGATIVE mg/dL / Nitrite: NEGATIVE   Leuk Esterase: Small / RBC: 5-10 /HPF / WBC > 10 /HPF   Sq Epi: x / Non Sq Epi: 0-5 /HPF / Bacteria: Present /HPF    RADIOLOGY & ADDITIONAL STUDIES: Reviewed.    < from: Xray Chest 1 View- PORTABLE-Urgent (Xray Chest 1 View- PORTABLE-Urgent .) (22 @ 07:47) >  IMPRESSION: Improved moderate diffuse bilateral airspace disease.    < end of copied text >

## 2022-04-05 NOTE — CHART NOTE - NSCHARTNOTEFT_GEN_A_CORE
Was called to bedside by nursing team for cardiac arrhythmia. Pt was found to be in SVT with HR to 130s. Pt was hemodynamically stable and asymptomatic during the episode. SVT was aborted with carotid massage and valsalva maneuver, and patient converted back to normal sinus rhythm.     Metoprolol tartrate 12.5mg PO BID was started earlier this morning. At approximately 11:00, was called to bedside by nursing team for cardiac arrhythmia. Pt was found to be in SVT with HR to 130s. Pt was hemodynamically stable and asymptomatic during the episode. SVT was aborted with carotid massage and valsalva maneuver, and patient converted back to normal sinus rhythm.     Metoprolol tartrate 12.5mg PO BID was started earlier this morning.

## 2022-04-05 NOTE — CONSULT NOTE ADULT - SUBJECTIVE AND OBJECTIVE BOX
KENIA KRISHNAMURTHY  [id number]  Patient is a 41y old  Male who presents with a chief complaint of Acute hypoxic respiratory failure (2022 07:16)    HPI:  41M PMH CAD, MI (2022, stent), Left heart thrombus (Xarelto) CHF, colon cancer (s/p resection ), HIV (Biktarvy), former smoker (1pack/week for 3 years) BIBEMS for worsening shortness of breath for two days. Patient reported a cold with congestion, took cold medicine and woke up with mild SOB that progressed over the next to days. He felt "winded" descending stairs. Endorsed dry cough and orthopnea but denied worsened leg swelling. No sick contacts, or covid exposure, Vaccinated x3. Denied dysuria, changes in ostomy output. Paints as a hobby with mild fume. His father  at age 30 from a heart attack.  [facility] to be in [facility] distress with hypoxia to 60% on RA and fever to 102.5. -32 /93 Placed on BiPap 10/5 FIO2 50%, satting 97% with RR 45. CXR showed bilateral diffuse opacification, enlarged cardiac silhouette hyperinflated lungs with flattened diaphragm. Per ED, EKG sinus tachycardia with left axis deviation. Labs significant for Trop I 170, ,  ProBNP 34,267, wbc 13.87, lactate 3.8, Hb 11.5, VBG with respiratory alkalosis. Given CTX 1g, Tylenol 975mg, decadron 10mg IV, morphine 2mg IV, nitroglycerin drip (5mcg), furosemide 80mv IV push and ramos with 1200cc urine output.  Accepted to CCU for management of acute hypoxic respiratory failure likely i/s/o acute heart failure exacerbation.     In the CCU    - VS: Tmax: 96.3, HR: 109, BP: 147/65 MAP 92, RR: 42, O2: 99% on BiPap     - EKG: Sinus tach. Nonspecific st changes. LAFB. Incomplete RBBB     - Bedside EF appears 20-25 with RMA    - Treatment/interventions: Stopped Nitro. Bipap 60% 15/8. Started lasix and heparin gtt    PMHx: See above  PSHx: Stent , Colon resection   Meds: atorva 80, biktarvy, plavix, epleronone, escitalopram, entresto  metoprolol succina 25, xarelto 15, torsemide  Allergies: None  Social: see below   (2022 03:08)      REVIEW OF SYSTEMS:  CONSTITUTIONAL: No weakness, fevers or chills  EYES/ENT: No visual changes;  No vertigo or throat pain   NECK: No pain or stiffness  RESPIRATORY: No cough, wheezing, hemoptysis; No shortness of breath  CARDIOVASCULAR: No chest pain or palpitations  GASTROINTESTINAL: No abdominal or epigastric pain. No nausea, vomiting, or hematemesis; No diarrhea or constipation. No melena or hematochezia.  GENITOURINARY: No dysuria, frequency or hematuria  NEUROLOGICAL: No numbness or weakness  SKIN: No itching, rashes       Allergies    No Known Allergies    Intolerances      Antimicrobials:  bictegravir 50 mG/emtricitabine 200 mG/tenofovir alafenamide 25 mG (BIKTARVY) 1 Tablet(s) Oral daily  piperacillin/tazobactam IVPB.. 3.375 Gram(s) IV Intermittent every 6 hours  trimethoprim / sulfamethoxazole IVPB 500 milliGRAM(s) IV Intermittent every 8 hours      Other Medications:  atorvastatin 80 milliGRAM(s) Oral at bedtime  chlorhexidine 2% Cloths 1 Application(s) Topical <User Schedule>  clopidogrel Tablet 75 milliGRAM(s) Oral daily  dextrose 5%. 1000 milliLiter(s) IV Continuous <Continuous>  dextrose 5%. 1000 milliLiter(s) IV Continuous <Continuous>  dextrose 50% Injectable 25 Gram(s) IV Push once  dextrose 50% Injectable 12.5 Gram(s) IV Push once  dextrose 50% Injectable 25 Gram(s) IV Push once  dextrose Oral Gel 15 Gram(s) Oral once PRN  glucagon  Injectable 1 milliGRAM(s) IntraMuscular once  insulin lispro (ADMELOG) corrective regimen sliding scale   SubCutaneous Before meals and at bedtime  pantoprazole    Tablet 40 milliGRAM(s) Oral before breakfast  predniSONE   Tablet 40 milliGRAM(s) Oral every 12 hours      FAMILY HISTORY:  FHx: early MI (Father)    FH: colon cancer    FH: breast cancer    FH: diabetes mellitus      PAST MEDICAL & SURGICAL HISTORY:  Acute myocardial infarction    Acute on chronic systolic congestive heart failure    HIV disease    Colon cancer    Left heart thrombus  on xarelto          SOCIAL HISTORY:    Daily     Daily     Vital Signs Last 24 Hrs  T(C): 36.8 (2022 05:56), Max: 36.8 (2022 05:56)  T(F): 98.2 (2022 05:56), Max: 98.2 (2022 05:56)  HR: 85 (2022 08:00) (78 - 94)  BP: 90/52 (2022 08:00) (83/48 - 108/68)  BP(mean): 65 (2022 08:00) (60 - 83)  RR: 22 (2022 08:00) (14 - 36)  SpO2: 92% (2022 08:00) (91% - 97%)    PHYSICAL EXAM  General: NAD  HEENT: NC/AT; PERRL, clear conjunctiva  Neck: supple  Respiratory: CTA b/l  Cardiovascular: +S1/S2; RRR  Abdomen: soft, NT/ND; +BS x4  Extremities: 2+ peripheral pulses b/l; no LE edema  Skin: normal color and turgor; no rash  Neurological: AAOx3. No focal neurologic deficits. Gait deferred.   Psychiatry: Mood and affect appropriate.  ~  Lines:  Ramos Catheter:  Drains:      Lab Results:                        11.0   16.48 )-----------( 284      ( 2022 05:49 )             31.6     04-05    134<L>  |  98  |  23  ----------------------------<  148<H>  4.2   |  26  |  1.49<H>    Ca    8.0<L>      2022 05:49  Phos  2.7     04-05  Mg     2.2     04-05    TPro  6.6  /  Alb  2.6<L>  /  TBili  0.6  /  DBili  x   /  AST  58<H>  /  ALT  70<H>  /  AlkPhos  60  04-05    LIVER FUNCTIONS - ( 2022 05:49 )  Alb: 2.6 g/dL / Pro: 6.6 g/dL / ALK PHOS: 60 U/L / ALT: 70 U/L / AST: 58 U/L / GGT: x           PT/INR - ( 2022 03:29 )   PT: 21.8 sec;   INR: 1.82          PTT - ( 2022 05:49 )  PTT:53.9 sec  Urinalysis Basic - ( 2022 02:44 )    Color: Yellow / Appearance: Clear / S.020 / pH: x  Gluc: x / Ketone: NEGATIVE  / Bili: Negative / Urobili: 1.0 E.U./dL   Blood: x / Protein: NEGATIVE mg/dL / Nitrite: NEGATIVE   Leuk Esterase: Small / RBC: 5-10 /HPF / WBC > 10 /HPF   Sq Epi: x / Non Sq Epi: 0-5 /HPF / Bacteria: Present /HPF        MICROBIOLOGY    IMAGING **INCOMPLETE NOTE****    KENIA KRISHNAMURTHY  [id number]  Patient is a 41y old  Male who presents with a chief complaint of Acute hypoxic respiratory failure (2022 07:16)    HPI:  41M PMH CAD, MI (2022, stent), Left heart thrombus (Xarelto) CHF, colon cancer (s/p resection ), HIV (Biktarvy), former smoker (1pack/week for 3 years) BIBEMS for worsening shortness of breath for two days. Patient reported a cold with congestion, took cold medicine and woke up with mild SOB that progressed over the next to days. He felt "winded" descending stairs. Endorsed dry cough and orthopnea but denied worsened leg swelling. No sick contacts, or covid exposure, Vaccinated x3. Denied dysuria, changes in ostomy output. Paints as a hobby with mild fume. His father  at age 30 from a heart attack.  [facility] distress with hypoxia to 60% on RA and fever to 102.5. -32 /93 Placed on BiPap 10/5 FIO2 50%, satting 97% with RR 45. CXR showed bilateral diffuse opacification, enlarged cardiac silhouette hyperinflated lungs with flattened diaphragm. Per ED, EKG sinus tachycardia with left axis deviation. Labs significant for Trop I 170, ,  ProBNP 34,267, wbc 13.87, lactate 3.8, Hb 11.5, VBG with respiratory alkalosis. Given CTX 1g, Tylenol 975mg, decadron 10mg IV, morphine 2mg IV, nitroglycerin drip (5mcg), furosemide 80mv IV push and ramos with 1200cc urine output.  Accepted to CCU for management of acute hypoxic respiratory failure likely i/s/o acute heart failure exacerbation.     In the CCU    - VS: Tmax: 96.3, HR: 109, BP: 147/65 MAP 92, RR: 42, O2: 99% on BiPap     - EKG: Sinus tach. Nonspecific st changes. LAFB. Incomplete RBBB     - Bedside EF appears 20-25 with RMA    - Treatment/interventions: Stopped Nitro. Bipap 60% 15/8. Started lasix and heparin gtt    PMHx: See above  PSHx: Stent , Colon resection   Meds: atorva 80, biktarvy, plavix, epleronone, escitalopram, entresto  metoprolol succina 25, xarelto 15, torsemide  Allergies: None  Social: see below   (2022 03:08)      REVIEW OF SYSTEMS:  CONSTITUTIONAL: No weakness, fevers or chills  EYES/ENT: No visual changes;  No vertigo or throat pain   NECK: No pain or stiffness  RESPIRATORY: No cough, wheezing, hemoptysis; No shortness of breath  CARDIOVASCULAR: No chest pain or palpitations  GASTROINTESTINAL: No abdominal or epigastric pain. No nausea, vomiting, or hematemesis; No diarrhea or constipation. No melena or hematochezia.  GENITOURINARY: No dysuria, frequency or hematuria  NEUROLOGICAL: No numbness or weakness  SKIN: No itching, rashes       Allergies    No Known Allergies    Intolerances      Antimicrobials:  bictegravir 50 mG/emtricitabine 200 mG/tenofovir alafenamide 25 mG (BIKTARVY) 1 Tablet(s) Oral daily  piperacillin/tazobactam IVPB.. 3.375 Gram(s) IV Intermittent every 6 hours  trimethoprim / sulfamethoxazole IVPB 500 milliGRAM(s) IV Intermittent every 8 hours      Other Medications:  atorvastatin 80 milliGRAM(s) Oral at bedtime  chlorhexidine 2% Cloths 1 Application(s) Topical <User Schedule>  clopidogrel Tablet 75 milliGRAM(s) Oral daily  dextrose 5%. 1000 milliLiter(s) IV Continuous <Continuous>  dextrose 5%. 1000 milliLiter(s) IV Continuous <Continuous>  dextrose 50% Injectable 25 Gram(s) IV Push once  dextrose 50% Injectable 12.5 Gram(s) IV Push once  dextrose 50% Injectable 25 Gram(s) IV Push once  dextrose Oral Gel 15 Gram(s) Oral once PRN  glucagon  Injectable 1 milliGRAM(s) IntraMuscular once  insulin lispro (ADMELOG) corrective regimen sliding scale   SubCutaneous Before meals and at bedtime  pantoprazole    Tablet 40 milliGRAM(s) Oral before breakfast  predniSONE   Tablet 40 milliGRAM(s) Oral every 12 hours      FAMILY HISTORY:  FHx: early MI (Father)    FH: colon cancer    FH: breast cancer    FH: diabetes mellitus      PAST MEDICAL & SURGICAL HISTORY:  Acute myocardial infarction    Acute on chronic systolic congestive heart failure    HIV disease    Colon cancer    Left heart thrombus  on xarelto          SOCIAL HISTORY:    Daily     Daily     Vital Signs Last 24 Hrs  T(C): 36.8 (2022 05:56), Max: 36.8 (2022 05:56)  T(F): 98.2 (2022 05:56), Max: 98.2 (2022 05:56)  HR: 85 (2022 08:00) (78 - 94)  BP: 90/52 (2022 08:00) (83/48 - 108/68)  BP(mean): 65 (2022 08:00) (60 - 83)  RR: 22 (2022 08:00) (14 - 36)  SpO2: 92% (2022 08:00) (91% - 97%)    PHYSICAL EXAM  General: NAD  HEENT: NC/AT; PERRL, clear conjunctiva  Neck: supple  Respiratory: CTA b/l  Cardiovascular: +S1/S2; RRR  Abdomen: soft, NT/ND; +BS x4  Extremities: 2+ peripheral pulses b/l; no LE edema  Skin: normal color and turgor; no rash  Neurological: AAOx3. No focal neurologic deficits. Gait deferred.   Psychiatry: Mood and affect appropriate.  ~  Lines:  Ramos Catheter:  Drains:      Lab Results:                        11.0   16.48 )-----------( 284      ( 2022 05:49 )             31.6     04-05    134<L>  |  98  |  23  ----------------------------<  148<H>  4.2   |  26  |  1.49<H>    Ca    8.0<L>      2022 05:49  Phos  2.7     04-05  Mg     2.2     04-05    TPro  6.6  /  Alb  2.6<L>  /  TBili  0.6  /  DBili  x   /  AST  58<H>  /  ALT  70<H>  /  AlkPhos  60  04-05    LIVER FUNCTIONS - ( 2022 05:49 )  Alb: 2.6 g/dL / Pro: 6.6 g/dL / ALK PHOS: 60 U/L / ALT: 70 U/L / AST: 58 U/L / GGT: x           PT/INR - ( 2022 03:29 )   PT: 21.8 sec;   INR: 1.82          PTT - ( 2022 05:49 )  PTT:53.9 sec  Urinalysis Basic - ( 2022 02:44 )    Color: Yellow / Appearance: Clear / S.020 / pH: x  Gluc: x / Ketone: NEGATIVE  / Bili: Negative / Urobili: 1.0 E.U./dL   Blood: x / Protein: NEGATIVE mg/dL / Nitrite: NEGATIVE   Leuk Esterase: Small / RBC: 5-10 /HPF / WBC > 10 /HPF   Sq Epi: x / Non Sq Epi: 0-5 /HPF / Bacteria: Present /HPF        MICROBIOLOGY    IMAGING INFECTIOUS DISEASE CONSULT NOTE    HPI:  41M PMH CAD, MI (2022, stent), Left heart thrombus (Xarelto) CHF, colon cancer (s/p resection ), HIV (Biktarvy), former smoker (1pack/week for 3 years) BIBEMS for worsening shortness of breath for two days. Patient reported a cold with congestion, took cold medicine and woke up with mild SOB that progressed over the next to days. He felt "winded" descending stairs. Endorsed dry cough and orthopnea but denied worsened leg swelling. No sick contacts, or covid exposure, Vaccinated x3. Denied dysuria, changes in ostomy output. Paints as a hobby with mild fume. His father  at age 30 from a heart attack.  [facility] with hypoxia to 60% on RA and fever to 102.5. -32 /93 Placed on BiPap 10/5 FIO2 50%, satting 97% with RR 45. CXR showed bilateral diffuse opacification, enlarged cardiac silhouette hyperinflated lungs with flattened diaphragm. Per ED, EKG sinus tachycardia with left axis deviation. Labs significant for Trop I 170, ,  ProBNP 34,267, wbc 13.87, lactate 3.8, Hb 11.5, VBG with respiratory alkalosis. Given CTX 1g, Tylenol 975mg, decadron 10mg IV, morphine 2mg IV, nitroglycerin drip (5mcg), furosemide 80mv IV push and ramos with 1200cc urine output.  Accepted to CCU for management of acute hypoxic respiratory failure likely i/s/o acute heart failure exacerbation.     In the CCU    - VS: Tmax: 96.3, HR: 109, BP: 147/65 MAP 92, RR: 42, O2: 99% on BiPap     - EKG: Sinus tach. Nonspecific st changes. LAFB. Incomplete RBBB     - Bedside EF appears 20-25 with RMA    - Treatment/interventions: Stopped Nitro. Bipap 60% 15/8. Started lasix and heparin gtt    PMHx: See above  PSHx: Stent , Colon resection   Meds: atorva 80, biktarvy, plavix, epleronone, escitalopram, entresto  metoprolol succina 25, xarelto 15, torsemide  Allergies: None  Social: see below  ----  41 M w/ PMH of CAD, MI (2022, s/p stent), left heart thrombus (on Xarelto, now resolved) CHF (EF 15-20%), colon cancer (s/p resection  w/ colostomy), HIV (on Biktarvy), former smoker (1pack/week for 3 years) BIBEMS for worsening shortness of breath for two days. He states that 3 days prior to admission, he began to experience cold symptoms for which he took cold medicine. The next morning, he woke up with SOB and chest tightness. He also reports a non-productive dry cough. Over the next 2 days his symptoms progressed with worsening SOB on ambulation (at baseline, able to walk 5 city blocks). He denies fever, chills, rhinorrhea, N/V, diarrhea, abd pain, rash. He denies any sick contacts or recent travel. He denies any recent fatigue or weight loss. Pt states that he is compliant with Biktarvy, missing doses only since onset of symptoms. He cannot recall recent CD4 count/viral load. He denies any hx of opportunistic infections, denies ever being told CD4 count is low and says that his HIV viral load has been undetectable in the past. He denies any known TB exposures.     REVIEW OF SYSTEMS:  CONSTITUTIONAL: No weakness, fevers or chills  EYES/ENT: No visual changes;  No vertigo or throat pain   NECK: No pain or stiffness  RESPIRATORY: + dry cough, +SOB, no wheezing, no hemoptysis  CARDIOVASCULAR: +chest tightness, no palpitations  GASTROINTESTINAL: No abdominal or epigastric pain. No nausea, vomiting, or hematemesis; No diarrhea or constipation. No melena or hematochezia.  GENITOURINARY: No dysuria, frequency or hematuria  NEUROLOGICAL: No numbness or weakness  SKIN: No itching, rashes     Allergies    No Known Allergies    Intolerances    Antimicrobials:  bictegravir 50 mG/emtricitabine 200 mG/tenofovir alafenamide 25 mG (BIKTARVY) 1 Tablet(s) Oral daily  piperacillin/tazobactam IVPB.. 3.375 Gram(s) IV Intermittent every 6 hours  trimethoprim / sulfamethoxazole IVPB 500 milliGRAM(s) IV Intermittent every 8 hours    Other Medications:  atorvastatin 80 milliGRAM(s) Oral at bedtime  chlorhexidine 2% Cloths 1 Application(s) Topical <User Schedule>  clopidogrel Tablet 75 milliGRAM(s) Oral daily  dextrose 5%. 1000 milliLiter(s) IV Continuous <Continuous>  dextrose 5%. 1000 milliLiter(s) IV Continuous <Continuous>  dextrose 50% Injectable 25 Gram(s) IV Push once  dextrose 50% Injectable 12.5 Gram(s) IV Push once  dextrose 50% Injectable 25 Gram(s) IV Push once  dextrose Oral Gel 15 Gram(s) Oral once PRN  glucagon  Injectable 1 milliGRAM(s) IntraMuscular once  insulin lispro (ADMELOG) corrective regimen sliding scale   SubCutaneous Before meals and at bedtime  pantoprazole    Tablet 40 milliGRAM(s) Oral before breakfast  predniSONE   Tablet 40 milliGRAM(s) Oral every 12 hours    FAMILY HISTORY:  FHx: early MI (Father)  FH: colon cancer  FH: breast cancer  FH: diabetes mellitus    PAST MEDICAL HISTORY:  Acute myocardial infarction    Acute on chronic systolic congestive heart failure    HIV disease: Pt was diagnosed with HIV in . He states that he was infected during a sexual encounter. He receives care at the [facility] at [address] [place] [address] and goes for appointments every 3 months. He has been treated in the past with Atripla, Stribild, Genvoya. He is now on Biktarvy.      Colon cancer    Left heart thrombus    SURGICAL HISTORY:  Colon resection ()  Cardiac stent ()    SOCIAL HISTORY:  Pt lives alone in apartment in the [facility]. He denies any pets at home. He was [place] in the [place]. He is currently unemployed. He is a former smoker (smoked 1pack/week) and quit after his MI. He endorses marijuana use but denies hx of IVDU. He denies current alcohol use (hx of use in his 20s). His sexual partners are male and he states that he consistently uses condoms during sexual encounters. He has not been sexually active in over 1 year. He denies hx of other STI. He paints as a hobby.     Vital Signs Last 24 Hrs  T(C): 36.8 (2022 05:56), Max: 36.8 (2022 05:56)  T(F): 98.2 (2022 05:56), Max: 98.2 (2022 05:56)  HR: 85 (2022 08:00) (78 - 94)  BP: 90/52 (2022 08:00) (83/48 - 108/68)  BP(mean): 65 (2022 08:00) (60 - 83)  RR: 22 (2022 08:00) (14 - 36)  SpO2: 92% (2022 08:00) (91% - 97%)    PHYSICAL EXAM  General: NAD, laying in bed, on HFNC 40/50%  HEENT: NC/AT; PERRL, clear conjunctiva  Neck: supple  Respiratory: b/l crackles at lung bases, no accessory muscle use  Cardiovascular: +S1/S2; RRR  Abdomen: soft, NT/ND; +BS x4  : Ramos draining dark yellow urine  Extremities: 2+ peripheral pulses b/l; trace pitting edema extending to knee  Skin: normal color and turgor; no rash  Neurological: AAOx3. No focal neurologic deficits. Gait deferred.   Psychiatry: Mood and affect appropriate.      Lab Results:                        11.0   16.48 )-----------( 284      ( 2022 05:49 )             31.6     04-05    134<L>  |  98  |  23  ----------------------------<  148<H>  4.2   |  26  |  1.49<H>    Ca    8.0<L>      2022 05:49  Phos  2.7     04-05  Mg     2.2     04-05    TPro  6.6  /  Alb  2.6<L>  /  TBili  0.6  /  DBili  x   /  AST  58<H>  /  ALT  70<H>  /  AlkPhos  60  04-05    LIVER FUNCTIONS - ( 2022 05:49 )  Alb: 2.6 g/dL / Pro: 6.6 g/dL / ALK PHOS: 60 U/L / ALT: 70 U/L / AST: 58 U/L / GGT: x           PT/INR - ( 2022 03:29 )   PT: 21.8 sec;   INR: 1.82          PTT - ( 2022 05:49 )  PTT:53.9 sec  Urinalysis Basic - ( 2022 02:44 )    Color: Yellow / Appearance: Clear / S.020 / pH: x  Gluc: x / Ketone: NEGATIVE  / Bili: Negative / Urobili: 1.0 E.U./dL   Blood: x / Protein: NEGATIVE mg/dL / Nitrite: NEGATIVE   Leuk Esterase: Small / RBC: 5-10 /HPF / WBC > 10 /HPF   Sq Epi: x / Non Sq Epi: 0-5 /HPF / Bacteria: Present /HPF    Lactate, Blood (22 @ 19:57)    Lactate, Blood: 1.6 mmol/L    Lactate Dehydrogenase, Serum (22 @ 03:29)    Lactate Dehydrogenase, Serum: 456 U/L    Full T Cell Subset (22 @ 15:15)    CD19 %: 29 %    CD3 %: 62 %    CD4 %: 28 %    CD8 %: 34 %    ABS QT6461: 55 /uL    ABS CD19: 204 /uL    ABS CD3: 413: 2407 lymphocytes were collected. The optimal collection of 2500  lymphocyte events is not met due to low cell count /uL    ABS CD4: 178 /uL    ABS CD8: 214 /uL    QT8944 %: 8 %    MICROBIOLOGY    Acute Hepatitis Panel (22 @ 03:29)    Hepatitis C Virus Interpretation: Nonreact    Hepatitis C Virus S/CO Ratio: 0.04 S/CO    Hepatitis B Core IgM Antibody: Nonreact    Hepatitis A IgM Antibody: Nonreact    Respiratory Viral Panel with COVID-19 by NATALIO (22 @ 22:52)    Rapid RVP Result: NotDete    SARS-CoV-2: NotDete    Legionella pneumophila Antigen, Urine (22 @ 06:11)    Legionella Antigen, Urine: Negative    MRSA/MSSA PCR (22 @ 03:33)    MRSA PCR Result.: Negative    Staph Aureus PCR Result: Negative    RECENT CULTURES   BCx: NGTD   UCx: E.coli   COVID: negative    IMAGING  EXAM:  XR CHEST AP OR PA 1V                        PROCEDURE DATE:  2022      FINDINGS:  Single frontal viewof the chest demonstrates severe diffuse dense   infiltrates. The cardiomediastinal silhouette is enlarged. No acute   osseous abnormalities. Overlying EKG leads and wires are noted    IMPRESSION: Severe diffuse bilateral dense infiltrates.   **INCOMPLETE NOTE ***    INFECTIOUS DISEASE CONSULT NOTE    HPI:  41M PMH CAD, MI (2022, stent), Left heart thrombus (Xarelto) CHF, colon cancer (s/p resection ), HIV (Biktarvy), former smoker (1pack/week for 3 years) BIBEMS for worsening shortness of breath for two days. Patient reported a cold with congestion, took cold medicine and woke up with mild SOB that progressed over the next to days. He felt "winded" descending stairs. Endorsed dry cough and orthopnea but denied worsened leg swelling. No sick contacts, or covid exposure, Vaccinated x3. Denied dysuria, changes in ostomy output. Paints as a hobby with mild fume. His father  at age 30 from a heart attack.  [facility] with hypoxia to 60% on RA and fever to 102.5. -32 /93 Placed on BiPap 10/5 FIO2 50%, satting 97% with RR 45. CXR showed bilateral diffuse opacification, enlarged cardiac silhouette hyperinflated lungs with flattened diaphragm. Per ED, EKG sinus tachycardia with left axis deviation. Labs significant for Trop I 170, ,  ProBNP 34,267, wbc 13.87, lactate 3.8, Hb 11.5, VBG with respiratory alkalosis. Given CTX 1g, Tylenol 975mg, decadron 10mg IV, morphine 2mg IV, nitroglycerin drip (5mcg), furosemide 80mv IV push and ramos with 1200cc urine output.  Accepted to CCU for management of acute hypoxic respiratory failure likely i/s/o acute heart failure exacerbation.     In the CCU    - VS: Tmax: 96.3, HR: 109, BP: 147/65 MAP 92, RR: 42, O2: 99% on BiPap     - EKG: Sinus tach. Nonspecific st changes. LAFB. Incomplete RBBB     - Bedside EF appears 20-25 with RMA    - Treatment/interventions: Stopped Nitro. Bipap 60% 15/8. Started lasix and heparin gtt    PMHx: See above  PSHx: Stent , Colon resection   Meds: atorva 80, biktarvy, plavix, epleronone, escitalopram, entresto  metoprolol succina 25, xarelto 15, torsemide  Allergies: None  Social: see below  ----  41 M w/ PMH of CAD, MI (2022, s/p stent), left heart thrombus (on Xarelto, now resolved) CHF (EF 15-20%), colon cancer (s/p resection  w/ colostomy), HIV (on Biktarvy), former smoker (1pack/week for 3 years) BIBEMS for worsening shortness of breath for two days. He states that 3 days prior to admission, he began to experience cold symptoms for which he took cold medicine. The next morning, he woke up with SOB and chest tightness. He also reports a non-productive dry cough. Over the next 2 days his symptoms progressed with worsening SOB on ambulation (at baseline, able to walk 5 city blocks). He denies fever, chills, rhinorrhea, N/V, diarrhea, abd pain, rash. He denies any sick contacts or recent travel. He denies any recent fatigue or weight loss. Pt states that he is compliant with Biktarvy, missing doses only since onset of symptoms. He cannot recall recent CD4 count/viral load. He denies any hx of opportunistic infections, denies ever being told CD4 count is low and says that his HIV viral load has been undetectable in the past. He denies any known TB exposures.     REVIEW OF SYSTEMS:  CONSTITUTIONAL: No weakness, fevers or chills  EYES/ENT: No visual changes;  No vertigo or throat pain   NECK: No pain or stiffness  RESPIRATORY: + dry cough, +SOB, no wheezing, no hemoptysis  CARDIOVASCULAR: +chest tightness, no palpitations  GASTROINTESTINAL: No abdominal or epigastric pain. No nausea, vomiting, or hematemesis; No diarrhea or constipation. No melena or hematochezia.  GENITOURINARY: No dysuria, frequency or hematuria  NEUROLOGICAL: No numbness or weakness  SKIN: No itching, rashes     Allergies    No Known Allergies    Intolerances    Antimicrobials:  bictegravir 50 mG/emtricitabine 200 mG/tenofovir alafenamide 25 mG (BIKTARVY) 1 Tablet(s) Oral daily  piperacillin/tazobactam IVPB.. 3.375 Gram(s) IV Intermittent every 6 hours  trimethoprim / sulfamethoxazole IVPB 500 milliGRAM(s) IV Intermittent every 8 hours    Other Medications:  atorvastatin 80 milliGRAM(s) Oral at bedtime  chlorhexidine 2% Cloths 1 Application(s) Topical <User Schedule>  clopidogrel Tablet 75 milliGRAM(s) Oral daily  dextrose 5%. 1000 milliLiter(s) IV Continuous <Continuous>  dextrose 5%. 1000 milliLiter(s) IV Continuous <Continuous>  dextrose 50% Injectable 25 Gram(s) IV Push once  dextrose 50% Injectable 12.5 Gram(s) IV Push once  dextrose 50% Injectable 25 Gram(s) IV Push once  dextrose Oral Gel 15 Gram(s) Oral once PRN  glucagon  Injectable 1 milliGRAM(s) IntraMuscular once  insulin lispro (ADMELOG) corrective regimen sliding scale   SubCutaneous Before meals and at bedtime  pantoprazole    Tablet 40 milliGRAM(s) Oral before breakfast  predniSONE   Tablet 40 milliGRAM(s) Oral every 12 hours    FAMILY HISTORY:  FHx: early MI (Father)  FH: colon cancer  FH: breast cancer  FH: diabetes mellitus    PAST MEDICAL HISTORY:  Acute myocardial infarction    Acute on chronic systolic congestive heart failure    HIV disease: Pt was diagnosed with HIV in . He states that he was infected during a sexual encounter. He receives care at the [facility] at [address] [place] [address] and goes for appointments every 3 months. He has been treated in the past with Atripla, Stribild, Genvoya. He is now on Biktarvy.      Colon cancer    Left heart thrombus    SURGICAL HISTORY:  Colon resection ()  Cardiac stent ()    SOCIAL HISTORY:  Pt lives alone in apartment in the [facility]. He denies any pets at home. He was born in the [place]. He is currently unemployed. He is a former smoker (smoked 1pack/week) and quit after his MI. He endorses marijuana use but denies hx of IVDU. He denies current alcohol use (hx of use in his 20s). His sexual partners are male and he states that he consistently uses condoms during sexual encounters. He has not been sexually active in over 1 year. He denies hx of other STI. He paints as a hobby.     Vital Signs Last 24 Hrs  T(C): 36.8 (2022 05:56), Max: 36.8 (2022 05:56)  T(F): 98.2 (2022 05:56), Max: 98.2 (2022 05:56)  HR: 85 (2022 08:00) (78 - 94)  BP: 90/52 (2022 08:00) (83/48 - 108/68)  BP(mean): 65 (2022 08:00) (60 - 83)  RR: 22 (2022 08:00) (14 - 36)  SpO2: 92% (2022 08:00) (91% - 97%)    PHYSICAL EXAM  General: NAD, laying in bed, on HFNC 40/50%  HEENT: NC/AT; PERRL, clear conjunctiva  Neck: supple  Respiratory: b/l crackles at lung bases, no accessory muscle use  Cardiovascular: +S1/S2; RRR  Abdomen: soft, NT/ND; +BS x4  : Ramos draining dark yellow urine  Extremities: 2+ peripheral pulses b/l; trace pitting edema extending to knee  Skin: normal color and turgor; no rash  Neurological: AAOx3. No focal neurologic deficits. Gait deferred.   Psychiatry: Mood and affect appropriate.      Lab Results:                        11.0   16.48 )-----------( 284      ( 2022 05:49 )             31.6     04-05    134<L>  |  98  |  23  ----------------------------<  148<H>  4.2   |  26  |  1.49<H>    Ca    8.0<L>      2022 05:49  Phos  2.7     04-05  Mg     2.2     04-05    TPro  6.6  /  Alb  2.6<L>  /  TBili  0.6  /  DBili  x   /  AST  58<H>  /  ALT  70<H>  /  AlkPhos  60  04-05    LIVER FUNCTIONS - ( 2022 05:49 )  Alb: 2.6 g/dL / Pro: 6.6 g/dL / ALK PHOS: 60 U/L / ALT: 70 U/L / AST: 58 U/L / GGT: x           PT/INR - ( 2022 03:29 )   PT: 21.8 sec;   INR: 1.82          PTT - ( 2022 05:49 )  PTT:53.9 sec  Urinalysis Basic - ( 2022 02:44 )    Color: Yellow / Appearance: Clear / S.020 / pH: x  Gluc: x / Ketone: NEGATIVE  / Bili: Negative / Urobili: 1.0 E.U./dL   Blood: x / Protein: NEGATIVE mg/dL / Nitrite: NEGATIVE   Leuk Esterase: Small / RBC: 5-10 /HPF / WBC > 10 /HPF   Sq Epi: x / Non Sq Epi: 0-5 /HPF / Bacteria: Present /HPF    Lactate, Blood (22 @ 19:57)    Lactate, Blood: 1.6 mmol/L    Lactate Dehydrogenase, Serum (22 @ 03:29)    Lactate Dehydrogenase, Serum: 456 U/L    Full T Cell Subset (22 @ 15:15)    CD19 %: 29 %    CD3 %: 62 %    CD4 %: 28 %    CD8 %: 34 %    ABS [id number]: 55 /uL    ABS CD19: 204 /uL    ABS CD3: 413: 2407 lymphocytes were collected. The optimal collection of 2500  lymphocyte events is not met due to low cell count /uL    ABS CD4: 178 /uL    ABS CD8: 214 /uL    [id number] %: 8 %    MICROBIOLOGY    Acute Hepatitis Panel (22 @ 03:29)    Hepatitis C Virus Interpretation: Nonreact    Hepatitis C Virus S/CO Ratio: 0.04 S/CO    Hepatitis B Core IgM Antibody: Nonreact    Hepatitis A IgM Antibody: Nonreact    Respiratory Viral Panel with COVID-19 by NATALIO (22 @ 22:52)    Rapid RVP Result: NotDete    SARS-CoV-2: NotDetec    Legionella pneumophila Antigen, Urine (22 @ 06:11)    Legionella Antigen, Urine: Negative    MRSA/MSSA PCR (22 @ 03:33)    MRSA PCR Result.: Negative    Staph Aureus PCR Result: Negative    RECENT CULTURES   BCx: NGTD   UCx: E.coli   COVID: negative    IMAGING  EXAM:  XR CHEST AP OR PA 1V                        PROCEDURE DATE:  2022      FINDINGS:  Single frontal viewof the chest demonstrates severe diffuse dense   infiltrates. The cardiomediastinal silhouette is enlarged. No acute   osseous abnormalities. Overlying EKG leads and wires are noted    IMPRESSION: Severe diffuse bilateral dense infiltrates.   INFECTIOUS DISEASE CONSULT NOTE    HPI:  41M PMH CAD, MI (2022, stent), Left heart thrombus (Xarelto) CHF, colon cancer (s/p resection ), HIV (Biktarvy), former smoker (1pack/week for 3 years) BIBEMS for worsening shortness of breath for two days. Patient reported a cold with congestion, took cold medicine and woke up with mild SOB that progressed over the next to days. He felt "winded" descending stairs. Endorsed dry cough and orthopnea but denied worsened leg swelling. No sick contacts, or covid exposure, Vaccinated x3. Denied dysuria, changes in ostomy output. Paints as a hobby with mild fume. His father  at age 30 from a heart attack.  [facility] be in [facility] respiratory distress with hypoxia to 60% on RA and fever to 102.5. -32 /93 Placed on BiPap 10/5 FIO2 50%, satting 97% with RR 45. CXR showed bilateral diffuse opacification, enlarged cardiac silhouette hyperinflated lungs with flattened diaphragm. Per ED, EKG sinus tachycardia with left axis deviation. Labs significant for Trop I 170, ,  ProBNP 34,267, wbc 13.87, lactate 3.8, Hb 11.5, VBG with respiratory alkalosis. Given CTX 1g, Tylenol 975mg, decadron 10mg IV, morphine 2mg IV, nitroglycerin drip (5mcg), furosemide 80mv IV push and ramos with 1200cc urine output.  Accepted to CCU for management of acute hypoxic respiratory failure likely i/s/o acute heart failure exacerbation.     In the CCU    - VS: Tmax: 96.3, HR: 109, BP: 147/65 MAP 92, RR: 42, O2: 99% on BiPap     - EKG: Sinus tach. Nonspecific st changes. LAFB. Incomplete RBBB     - Bedside EF appears 20-25 with RMA    - Treatment/interventions: Stopped Nitro. Bipap 60% 15/8. Started lasix and heparin gtt    PMHx: See above  PSHx: Stent , Colon resection   Meds: atorva 80, biktarvy, plavix, epleronone, escitalopram, entresto  metoprolol succina 25, xarelto 15, torsemide  Allergies: None  Social: see below  ----  41 M w/ PMH of CAD, MI (2022, s/p stent), left heart thrombus (on Xarelto, now resolved) CHF (EF 15-20%), colon cancer (s/p resection  w/ colostomy), HIV (on Biktarvy), former smoker (1pack/week for 3 years) BIBEMS for worsening shortness of breath for two days. He states that 3 days prior to admission, he began to experience cold symptoms for which he took cold medicine. The next morning, he woke up with SOB and chest tightness. He also reports a non-productive dry cough. Over the next 2 days his symptoms progressed with worsening SOB on ambulation (at baseline, able to walk 5 city blocks). He denies fever, chills, rhinorrhea, N/V, diarrhea, abd pain, rash. He denies any sick contacts or recent travel. He denies any recent fatigue or weight loss. Pt states that he is compliant with Biktarvy, missing doses only since onset of symptoms. He cannot recall recent CD4 count/viral load. He denies any hx of opportunistic infections, denies ever being told CD4 count is low and says that his HIV viral load has been undetectable in the past. He denies any known TB exposures.     REVIEW OF SYSTEMS:  CONSTITUTIONAL: No weakness, fevers or chills  EYES/ENT: No visual changes;  No vertigo or throat pain   NECK: No pain or stiffness  RESPIRATORY: + dry cough, +SOB, no wheezing, no hemoptysis  CARDIOVASCULAR: +chest tightness, no palpitations  GASTROINTESTINAL: No abdominal or epigastric pain. No nausea, vomiting, or hematemesis; No diarrhea or constipation. No melena or hematochezia.  GENITOURINARY: No dysuria, frequency or hematuria  NEUROLOGICAL: No numbness or weakness  SKIN: No itching, rashes     Allergies    No Known Allergies    Intolerances    Antimicrobials:  bictegravir 50 mG/emtricitabine 200 mG/tenofovir alafenamide 25 mG (BIKTARVY) 1 Tablet(s) Oral daily  piperacillin/tazobactam IVPB.. 3.375 Gram(s) IV Intermittent every 6 hours  trimethoprim / sulfamethoxazole IVPB 500 milliGRAM(s) IV Intermittent every 8 hours    Other Medications:  atorvastatin 80 milliGRAM(s) Oral at bedtime  chlorhexidine 2% Cloths 1 Application(s) Topical <User Schedule>  clopidogrel Tablet 75 milliGRAM(s) Oral daily  dextrose 5%. 1000 milliLiter(s) IV Continuous <Continuous>  dextrose 5%. 1000 milliLiter(s) IV Continuous <Continuous>  dextrose 50% Injectable 25 Gram(s) IV Push once  dextrose 50% Injectable 12.5 Gram(s) IV Push once  dextrose 50% Injectable 25 Gram(s) IV Push once  dextrose Oral Gel 15 Gram(s) Oral once PRN  glucagon  Injectable 1 milliGRAM(s) IntraMuscular once  insulin lispro (ADMELOG) corrective regimen sliding scale   SubCutaneous Before meals and at bedtime  pantoprazole    Tablet 40 milliGRAM(s) Oral before breakfast  predniSONE   Tablet 40 milliGRAM(s) Oral every 12 hours    FAMILY HISTORY:  FHx: early MI (Father)  FH: colon cancer  FH: breast cancer  FH: diabetes mellitus    PAST MEDICAL HISTORY:  Acute myocardial infarction    Acute on chronic systolic congestive heart failure    HIV disease: Pt was diagnosed with HIV in . He states that he was infected during a sexual encounter. He receives care at the [facility] at [address] [place] [address] and goes for appointments every 3 months. He has been treated in the past with Atripla, Stribild, Genvoya. He is now on Biktarvy.      Colon cancer    Left heart thrombus    SURGICAL HISTORY:  Colon resection ()  Cardiac stent ()    SOCIAL HISTORY:  Pt lives alone in apartment in the [facility]. He denies any pets at home. [place] was [place]. He is currently unemployed. He is a former smoker (smoked 1pack/week) and quit after his MI. He endorses marijuana use but denies hx of IVDU. He denies current alcohol use (hx of use in his 20s). His sexual partners are male and he states that he consistently uses condoms during sexual encounters. He has not been sexually active in over 1 year. He denies hx of other STI. He paints as a hobby.     Vital Signs Last 24 Hrs  T(C): 36.8 (2022 05:56), Max: 36.8 (2022 05:56)  T(F): 98.2 (2022 05:56), Max: 98.2 (2022 05:56)  HR: 85 (2022 08:00) (78 - 94)  BP: 90/52 (2022 08:00) (83/48 - 108/68)  BP(mean): 65 (2022 08:00) (60 - 83)  RR: 22 (2022 08:00) (14 - 36)  SpO2: 92% (2022 08:00) (91% - 97%)    PHYSICAL EXAM  General: NAD, laying in bed, on HFNC 40/50%  HEENT: NC/AT; PERRL, clear conjunctiva  Neck: supple  Respiratory: b/l crackles at lung bases, no accessory muscle use  Cardiovascular: +S1/S2; RRR  Abdomen: soft, NT/ND; +BS x4  : Ramos draining dark yellow urine  Extremities: 2+ peripheral pulses b/l; trace pitting edema extending to knee  Skin: normal color and turgor; no rash  Neurological: AAOx3. No focal neurologic deficits. Gait deferred.   Psychiatry: Mood and affect appropriate.      Lab Results:                        11.0   16.48 )-----------( 284      ( 2022 05:49 )             31.6     04-05    134<L>  |  98  |  23  ----------------------------<  148<H>  4.2   |  26  |  1.49<H>    Ca    8.0<L>      2022 05:49  Phos  2.7     04-05  Mg     2.2     04-05    TPro  6.6  /  Alb  2.6<L>  /  TBili  0.6  /  DBili  x   /  AST  58<H>  /  ALT  70<H>  /  AlkPhos  60  04-05    LIVER FUNCTIONS - ( 2022 05:49 )  Alb: 2.6 g/dL / Pro: 6.6 g/dL / ALK PHOS: 60 U/L / ALT: 70 U/L / AST: 58 U/L / GGT: x           PT/INR - ( 2022 03:29 )   PT: 21.8 sec;   INR: 1.82          PTT - ( 2022 05:49 )  PTT:53.9 sec  Urinalysis Basic - ( 2022 02:44 )    Color: Yellow / Appearance: Clear / S.020 / pH: x  Gluc: x / Ketone: NEGATIVE  / Bili: Negative / Urobili: 1.0 E.U./dL   Blood: x / Protein: NEGATIVE mg/dL / Nitrite: NEGATIVE   Leuk Esterase: Small / RBC: 5-10 /HPF / WBC > 10 /HPF   Sq Epi: x / Non Sq Epi: 0-5 /HPF / Bacteria: Present /HPF    Lactate, Blood (22 @ 19:57)    Lactate, Blood: 1.6 mmol/L    Lactate Dehydrogenase, Serum (22 @ 03:29)    Lactate Dehydrogenase, Serum: 456 U/L    Full T Cell Subset (22 @ 15:15)    CD19 %: 29 %    CD3 %: 62 %    CD4 %: 28 %    CD8 %: 34 %    ABS [id number]: 55 /uL    ABS CD19: 204 /uL    ABS CD3: 413: 2407 lymphocytes were collected. The optimal collection of 2500  lymphocyte events is not met due to low cell count /uL    ABS CD4: 178 /uL    ABS CD8: 214 /uL    [id number] %: 8 %    MICROBIOLOGY    Acute Hepatitis Panel (22 @ 03:29)    Hepatitis C Virus Interpretation: Nonreact    Hepatitis C Virus S/CO Ratio: 0.04 S/CO    Hepatitis B Core IgM Antibody: Nonreact    Hepatitis A IgM Antibody: Nonreact    Respiratory Viral Panel with COVID-19 by NATALIO (22 @ 22:52)    Rapid RVP Result: NotDete    SARS-CoV-2: NotDete    Legionella pneumophila Antigen, Urine (22 @ 06:11)    Legionella Antigen, Urine: Negative    MRSA/MSSA PCR (22 @ 03:33)    MRSA PCR Result.: Negative    Staph Aureus PCR Result: Negative    Cryptococcal Antigen - Serum (22 @ 12:13)    Cryptococcal Antigen - Serum: Negative    HIV-1 RNA Quantitative, Viral Load (22 @ 13:31)    HIV-1 RNA Quantitative, Viral Load: 50,732    HIV-1 Viral Load Result: DET.    RECENT CULTURES   BCx: NGTD   UCx: E.coli   COVID: negative    IMAGING  EXAM:  XR CHEST AP OR PA 1V                        PROCEDURE DATE:  2022      FINDINGS:  Single frontal viewof the chest demonstrates severe diffuse dense   infiltrates. The cardiomediastinal silhouette is enlarged. No acute   osseous abnormalities. Overlying EKG leads and wires are noted    IMPRESSION: Severe diffuse bilateral dense infiltrates.

## 2022-04-05 NOTE — PROGRESS NOTE ADULT - ASSESSMENT
41M PMH CAD, MI (Jan 2022, stent), Left heart thrombus (Xarelto) CHF (unknown EF), colon cancer (PAWAN, s/p resection/chemo 2016), HIV (Biktarvy), former smoker (<1 pack year) BIBEMS for worsening shortness of breath for two days with nonproductive cough, admitted to CCU for acute hypoxic respiratory failure 2/2 PNA vs acute heart failure exacerbation.    NEURO  No active issues, AAOX3, neuro intact    CVS  #Acute Decompensated Heart Failure  Known CHF. Baseline EF on 2/2022 was 15-20% with regional wall abnormality. No valvular disease.  Primary cardiologist is at [facility] (Dr. Saniya Ramirez and Dr. Marlo Kapoor)  Home meds: Entresto, torsemide, Toprol XL  Diffuse pulmonary edema on XR, volume overloaded on exam with edema, S3, ProBNP 34,267. S/p lasix 80mg and NG drip in ED.  EKG 4/4: Sinus tach. Nonspecific st changes. LAFB. Incomplete RBBB  Troponin I 170, ProBNP 34,267  Bedside EF appears 20-25 with RMA  - c/w Lasix gtt @5mg/hr with q12 CMP and repletion  - holding Entresto, restart when BP stable  - holding Toprol, restart Lopressor 12.5mg when euvolemic  - holding torsemide, restart when appropriate  - f/u TTE complete with contrast  - strict I&Os    #CAD  #History of MI s/p PCI  Home Plavix & Xarelto, atorvastatin 80mg.  MI in Jan 2022 at [facility], stent placed to pLAD, stent re-stenosed.  Primary cardiologist is at [facility] (Dr. Saniya Ramirez and Dr. Marlo Kapoor)  Lipid panel unremarkable.  - c/w heparin gtt, likely transition back to Xarelto tomorrow  - c/w Plavix 75mg daily  - c/w atorvastatin 80mg daily    #H/o LV thrombus  After initial MI in 1/2022, small LV thrombus. Last seen on 1/6. No longer seen on subsequent studies (2/2022).  Home Xarelto recently switched from coumadin.  - c/w heparin gtt, likely transition back to Xarelto tomorrow  - f/u TTE complete with contrast    #R Pulmonary embolism   Hypoxia, tachycardia, tachypnea,  elevated D-Dimer, elevated pro-BNP, history of cancer  - f/u BL LE ultrasound r/o DVT    PULM  #Acute Hypoxic Respiratory Failure likely 2/2 CHF exacerbation vs PNA  SOB progressing for 2 days after cold-like illness found to be in AHRF requiring BIPAP.   Febrile on admission. Elevated LDH, d-dimer, LDH, procal, ESR, CRP.   COVID, MRSA nares, Ur legionella/strep neg.  CXR shows bilateral hilar congestion and diffuse bilateral opacification   Differentials include ARDS, flash pulmonary edema, CHF exacerbation, COVID, Pneumonia. COVID negative in ED however ARDS with decreased lymphocyte%.   Less suspicion for PCP as patient states compliant on Biktarvy.  - transition to HFNC from BIPAP  - STOP decadron and monitor clinical status on diuretics  - STOP vancomycin  - c/w Lasix gtt, monitor UOP and CXR  - c/w zosyn 3.375g q6h  - f/u HIV viral load and CD4        - If CD4<200, start PCP treatment  - obtain sputum cx if produces sputum  - f/u RVP, fungitell    #Pneumonia  S/p CTX, decadron 10mg. BIPAP in ED with improvement in SpO2.   COVID, MRSA nares, Ur legionella/strep neg.  Elevated Procal 5.27,    - acute hypoxic respiratory failure plan, as above    GI  #Transaminitis  May be i/s/o acute HF exacerbation with poor perfusion (transaminits, lactate) or hepatitis i/s/o immunocompromise from HIV. Denied EtOH. Hepatitis panel wnl.  - trend CMP    #Colon Cancer s/p Resection  Resection in 2016. Colostomy bag in place. Follows with Dr. Guilherme Travis with [facility]. No active disease.  - outpatient follow-up    RENAL  #KULWINDER vs KULWINDER on CKD  Elevated Cr. Unknown baseline. Likely pre-renal as also with lactate, transaminitis in acute heart failure exacerbation. FeUrea pre-renal.  - continue to trend creatinine  - avoid nephrotoxic medications    HEME  #Leukocytosis  WBC 13.87 on admission and febrile to 102.5. Procal 5.2. Being treated for possible CAP vs. PCP PNA. Previously on decadron earlier in course.  - holding decadron, as above  - abx plan, as above  - continue to trend    ENDO  A1c 5.2, no PMH of diabetes  - low ISS  - holding decadron, as above  - FSG before meals and at bedtime    ID  #Severe sepsis  Presented with lactate 3.8, tachypnea, leukocytosis with neutrophilic predominance and fever.   Likely due to PNA vs acute decompensated HF vs AHRF due to COVID. Less likely from UTI as denies urinary symptoms. Lactate now cleared.  s/p CTX 1gr in ED. s/p vanc x1. RVP, COVID, MRSA nares negative.  - STOP vancomycin  - c/w zosyn 3.375g q6h  - f/u blood culture, UCx  - obtain sputum cx if produces sputum    #HIV  Febrile on admission.  Hx HIV (dx 2010). States compliance on Biktarvy. States last viral load was wnl in 1/2022. Per pt, last sexual encounter was over 1 year ago.  [facility].  - c/w home Biktarvy  - f/u viral load, CD4        - start PCP ppx if CD4<200    #Asymptomatic bacteriuria  Denies dysuria, urinary frequency/urgency. No suprapubic/CVA tenderness. Urine: small LE, neg nitrites. WBC>10.   - on abx for PNA, as above  - f/u urine cx results      PROPHYLAXIS  Fluids: None  Electrolytes: replete as necessary, K>4, Mg>2  Nutrition: DASH  Bowel Regimen: None  DVT ppx: Heparin gtt  GI ppx: Pantoprazole 40mg IV   Code: Full  Disposition: CCU 41M PMH CAD, MI (Jan 2022, stent), Left heart thrombus (Xarelto) CHF (unknown EF), colon cancer (PAWAN, s/p resection/chemo 2016), HIV (Biktarvy), former smoker (<1 pack year) BIBEMS for worsening shortness of breath for two days with nonproductive cough, admitted to CCU for acute hypoxic respiratory failure 2/2 PNA vs acute heart failure exacerbation.    NEURO  No active issues, AAOX3, neuro intact    CVS  #Acute Decompensated Heart Failure  Known CHF. Baseline EF on 2/2022 was 15-20% with regional wall abnormality. No valvular disease.  Primary cardiologist is at [facility] (Dr. Saniya Rmairez and Dr. Marlo Kapoor)  Home meds: Entresto, torsemide, Toprol XL  Diffuse pulmonary edema on XR, volume overloaded on exam with edema, S3, ProBNP 34,267. S/p lasix 80mg and NG drip in ED.  EKG 4/4: Sinus tach. Nonspecific st changes. LAFB. Incomplete RBBB  Troponin I 170, ProBNP 34,267  - holding Lasix gtt I/S/O hypotension  - holding home GDMT        - holding Entresto, restart when BP stable        - holding Toprol, restart Lopressor 12.5mg when euvolemic        - holding torsemide, was on lasix gtt   - strict I&Os    #CAD  #History of MI s/p PCI  Home Plavix & Xarelto, atorvastatin 80mg.  MI in Jan 2022 at [facility], stent placed to [facility], stent re-stenosed.  Primary cardiologist is at [facility] (Dr. Saniya Ramirez and Dr. Marlo Kapoor)  Lipid panel unremarkable.  - c/w heparin gtt, likely transition back to Xarelto tomorrow  - c/w Plavix 75mg daily  - c/w atorvastatin 80mg daily    #H/o LV thrombus  After initial MI in 1/2022, small LV thrombus. Last seen on 1/6. No longer seen on subsequent studies (2/2022).  Home Xarelto recently switched from coumadin.  - discontinue heparin gtt  - now s/p 3 months of Xarelto      #R Pulmonary embolism   Hypoxia, tachycardia, tachypnea,  elevated D-Dimer, elevated pro-BNP, history of cancer. No calf tenderness, LE dopplers negative.  - monitor RR and SpO2    PULM  #Acute Hypoxic Respiratory Failure likely 2/2 CHF exacerbation vs PNA  SOB progressing for 2 days after cold-like illness found to be in AHRF requiring BIPAP.   Febrile on admission. Elevated LDH, d-dimer, LDH, procal, ESR, CRP.   COVID, MRSA nares, Ur legionella/strep neg.  CXR shows bilateral hilar congestion and diffuse bilateral opacification   Differentials include ARDS, flash pulmonary edema, CHF exacerbation, COVID, Pneumonia. COVID negative in ED however ARDS with decreased lymphocyte%.   Less suspicion for PCP as patient states compliant on Biktarvy.  s/p CTX in ED and vanc x1 dose  - c/w HFNC, wean as tolerated  - c/w Lasix gtt, monitor UOP and CXR  - c/w zosyn 3.375g q6h  - f/u HIV viral load and CD4        - If CD4<200, start PCP treatment  - obtain sputum cx if produces sputum  - f/u RVP, fungitell    #Pneumonia  S/p CTX, decadron 10mg. BIPAP in ED with improvement in SpO2.   COVID, MRSA nares, Ur legionella/strep neg.  Elevated Procal 5.27,    - acute hypoxic respiratory failure plan, as above    GI  #Transaminitis  May be i/s/o acute HF exacerbation with poor perfusion (transaminits, lactate) or hepatitis i/s/o immunocompromise from HIV. Denied EtOH. Hepatitis panel wnl.  - trend CMP    #Colon Cancer s/p Resection  Resection in 2016. Colostomy bag in place. Follows with Dr. Guilherme Travis with [facility]. No active disease.  - outpatient follow-up    RENAL  #KULWINDER vs KULWINDER on CKD  Elevated Cr. Unknown baseline. Likely pre-renal as also with lactate, transaminitis in acute heart failure exacerbation. FeUrea pre-renal.  - continue to trend creatinine  - avoid nephrotoxic medications    HEME  #Leukocytosis  WBC 13.87 on admission and febrile to 102.5. Procal 5.2. Being treated for possible CAP vs. PCP PNA. Previously on decadron earlier in course.  - on dexamethasone for empiric PCP tx  - abx plan, as above  - continue to trend    ENDO  A1c 5.2, no PMH of diabetes  - low ISS  - holding decadron, as above  - FSG before meals and at bedtime    ID  #Severe sepsis  Presented with lactate 3.8, tachypnea, leukocytosis with neutrophilic predominance and fever.   Likely due to PNA vs acute decompensated HF vs AHRF due to COVID. Less likely from UTI as denies urinary symptoms. Lactate now cleared.  s/p CTX 1gr in ED. s/p vanc x1. RVP, COVID, MRSA nares negative.  - STOP vancomycin  - c/w zosyn 3.375g q6h  - f/u blood culture, UCx  - obtain sputum cx if produces sputum    #HIV  Febrile on admission.  Hx HIV (dx 2010). States compliance on Biktarvy. States last viral load was wnl in 1/2022. Per pt, last sexual encounter was over 1 year ago.  Follows at [facility] for care.  - c/w home Biktarvy  - f/u viral load, CD4        - start PCP ppx if CD4<200    #Asymptomatic bacteriuria  Denies dysuria, urinary frequency/urgency. No suprapubic/CVA tenderness. Urine: small LE, neg nitrites. WBC>10.   - on abx for PNA, as above  - f/u urine cx results      PROPHYLAXIS  Fluids: None  Electrolytes: replete as necessary, K>4, Mg>2  Nutrition: DASH  Bowel Regimen: None  DVT ppx: Heparin gtt  GI ppx: Pantoprazole 40mg IV   Code: Full  Disposition: CCU 41M PMH CAD, MI (Jan 2022, stent), Left heart thrombus (Xarelto) CHF (unknown EF), colon cancer (PAWAN, s/p resection/chemo 2016), HIV (Biktarvy), former smoker (<1 pack year) BIBEMS for worsening shortness of breath for two days with nonproductive cough, admitted to CCU for acute hypoxic respiratory failure 2/2 PNA vs acute heart failure exacerbation.    NEURO  No active issues, AAOX3, neuro intact    CVS  #Acute Decompensated Heart Failure  Known CHF. Baseline EF on 2/2022 was 15-20% with regional wall abnormality. No valvular disease.  Primary cardiologist is at [facility] (Dr. Saniya Ramirez and Dr. Marlo Kapoor).  Home meds: Entresto, torsemide, Toprol XL  Diffuse pulmonary edema on XR, volume overloaded on exam with edema, S3, ProBNP 34,267. S/p lasix 80mg and NG drip in ED.  EKG 4/4: Sinus tach. Nonspecific st changes. LAFB. Incomplete RBBB  Troponin I 170, ProBNP 34,267  TTE (4/4) shows EF 10-15%, no LV thrombus, dilated LV, severely dilated LA, mild/mod TR/MR.  - holding Lasix gtt I/S/O hypotension  - introduce GDMT as appropriate:        - START Lopressor 12.5mg PO BID        - holding Entresto, restart when BP stable        - holding torsemide, restart when BP stable  - strict I&Os    #CAD  #History of MI s/p PCI  Home Plavix & Xarelto, atorvastatin 80mg.  MI in Jan 2022 at [facility], stent placed to pLAD, stent re-stenosed.  Primary cardiologist is at [facility] (Dr. Saniya Ramirez and Dr. Marlo Kapoor)  Lipid panel unremarkable.  - discontinued home Xarelto 15mg (no longer with LV thrombus, s/p 3mo tx)  - ASA 325mg loading dose --> ASA 81mg daily starting tomorrow  - c/w Plavix 75mg daily  - c/w atorvastatin 80mg daily    #H/o LV thrombus  After initial MI in 1/2022, small LV thrombus. Last seen on 1/6. No longer seen on subsequent studies (2/2022).  Thrombus not seen on TTE on current admission.  Home Xarelto recently switched from coumadin.  - now s/p 3 months of Xarelto  - CAD plan, as above    #R Pulmonary embolism   Hypoxia, tachycardia, tachypnea,  elevated D-Dimer, elevated pro-BNP, history of cancer. No calf tenderness, LE dopplers negative.  - monitor RR and SpO2    PULM  #Acute Hypoxic Respiratory Failure likely 2/2 CHF exacerbation vs PNA  Worsening SOB x2d, found to be in AHRF requiring BIPAP.   Ddx ARDS, flash pulmonary edema, CHF exacerbation, bacterial PNA, PCP PNA.  Febrile initially. Elevated LDH, d-dimer, LDH, procal, ESR, CRP.   CXR shows bilateral hilar congestion and diffuse bilateral opacities  Negative COVID, MRSA nares, Ur legionella/strep.  s/p CTX in ED and vanc x1 dose  - on HFNC, wean as tolerated  - holding Lasix gtt, as pt seems euvolemic  - pulmonlogy consulted, appreciate recs        - send sputum for culture & PCP        - proceed with CT chest        - no bronchoscopy at this time  - ID consulted, appreciate recs        - CD4 174 on current admission        - START bactrim 500mg IV q8h for empiric PCP tx        - START prednisone 40mg PO BID        - c/w zosyn 3.375g q6h for possible bacterial PNA        - f/u fungitell, TB quant    #Pneumonia (bacterial vs. PCP)  S/p CTX, decadron 10mg. BIPAP in ED with improvement in SpO2.  COVID, MRSA nares, Ur legionella/strep neg.  Elevated Procal 5.27,   - AHRF plan, as above  - ID & pulmonology following, as above    GI  #Transaminitis  May be i/s/o acute HF exacerbation with poor perfusion transaminitis lactate) or hepatitis (i/s/o immunocompromise from HIV). Denied EtOH. Hepatitis panel wnl.  - trend CMP  - f/u remaining hepatitis labs (HBV cAb total/sAb/sAg, HCV PCR)    #Colon Cancer s/p Resection  Resection in 2016. Colostomy bag in place. Follows with Dr. Guilherme Travis with [facility]. No active disease.  - outpatient follow-up    RENAL  #KULWINDER vs KULWINDER on CKD  Elevated Cr. Unknown baseline. Likely pre-renal as also with lactate, transaminitis in acute heart failure exacerbation. FeUrea pre-renal.  - continue to trend creatinine  - avoid nephrotoxic medications    HEME  #Leukocytosis  WBC 13.87 on admission and febrile to 102.5. Procal 5.2. Being treated for possible CAP vs. PCP PNA. Previously on decadron earlier in course.  - on prednisone for empiric PCP tx  - abx plan, as above  - continue to trend    ENDO  A1c 5.2, no PMH of diabetes  - low ISS  - on prednisone for empiric PCP tx  - FSG before meals and at bedtime    ID  #Severe sepsis  Presented with lactate 3.8, tachypnea, leukocytosis with neutrophilic predominance and fever.   Likely due to PNA vs acute decompensated HF vs AHRF due to COVID. Less likely from UTI as denies urinary symptoms. Lactate now cleared.  s/p CTX 1gr in ED. s/p vanc x1. RVP, COVID, MRSA nares negative.  - c/w zosyn 3.375g q6h  - START bactrim for empiric PCP tx, as above  - f/u blood culture, UCx  - will send sputum cx, PCP sputum    #HIV  CD4 178, VL 50,732. Febrile on admission.  PMH HIV (dx 2010). States compliance on Biktarvy. States last viral load was wnl in 1/2022. Per pt, last sexual encounter was over 1 year ago. Follows at [facility].   Prior labs in 12/2021 showed CD4 671,   - c/w home Biktarvy  - HIV team consulted, appreciate recs    #Asymptomatic e. coli bacteriuria  Denies dysuria, urinary frequency/urgency. No suprapubic/CVA tenderness. Urine: small LE, neg nitrites. WBC>10. Ucx shows e. coli >100k.  - on abx for PNA, as above      PROPHYLAXIS  Fluids: None  Electrolytes: replete as necessary, K>4, Mg>2  Nutrition: DASH  Bowel Regimen: None  DVT ppx: Heparin gtt  GI ppx: Pantoprazole 40mg IV   Code: Full  Disposition: CCU 41M PMH CAD, MI (Jan 2022, stent), Left heart thrombus (Xarelto) CHF (unknown EF), colon cancer (PAWAN, s/p resection/chemo 2016), HIV (Biktarvy), former smoker (<1 pack year) BIBEMS for worsening shortness of breath for two days with nonproductive cough, admitted to CCU for acute hypoxic respiratory failure 2/2 PNA vs acute heart failure exacerbation.    NEURO  No active issues, AAOX3, neuro intact    CVS  #Acute Decompensated Heart Failure  Known CHF. Baseline EF on 2/2022 was 15-20% with regional wall abnormality. No valvular disease.  Primary cardiologist is at [facility] (Dr. Saniya Ramirez and Dr. Marlo Kapoor).  Home meds: Entresto, torsemide, Toprol XL  Diffuse pulmonary edema on XR, volume overloaded on exam with edema, S3, ProBNP 34,267. S/p lasix 80mg and NG drip in ED.  EKG 4/4: Sinus tach. Nonspecific st changes. LAFB. Incomplete RBBB  Troponin I 170, ProBNP 34,267  TTE (4/4) shows EF 10-15%, no LV thrombus, dilated LV, severely dilated LA, mild/mod TR/MR.  - holding Lasix gtt I/S/O hypotension  - introduce GDMT as appropriate:        - START Lopressor 12.5mg PO BID        - holding Entresto, restart when BP stable        - holding torsemide, restart when BP stable  - strict I&Os    #CAD  #History of MI s/p PCI  Home Plavix & Xarelto, atorvastatin 80mg.  MI in Jan 2022 at [facility], stent placed to pLAD, stent re-stenosed.  Primary cardiologist is at [facility] (Dr. Saniya Ramirez and Dr. Marlo Kapoor)  Lipid panel unremarkable.  - discontinued home Xarelto 15mg (no longer with LV thrombus, s/p 3mo tx)  - ASA 325mg loading dose --> ASA 81mg daily starting tomorrow  - c/w Plavix 75mg daily  - c/w atorvastatin 80mg daily    #H/o LV thrombus  After initial MI in 1/2022, small LV thrombus. Last seen on 1/6. No longer seen on subsequent studies (2/2022).  Thrombus not seen on TTE on current admission.  Home Xarelto recently switched from coumadin.  - now s/p 3 months of Xarelto  - CAD plan, as above    #R Pulmonary embolism   Hypoxia, tachycardia, tachypnea,  elevated D-Dimer, elevated pro-BNP, history of cancer. No calf tenderness, LE dopplers negative.  - monitor RR and SpO2    PULM  #Acute Hypoxic Respiratory Failure likely 2/2 CHF exacerbation vs PNA  Worsening SOB x2d, found to be in AHRF requiring BIPAP.   Ddx ARDS, flash pulmonary edema, CHF exacerbation, bacterial PNA, PCP PNA.  Febrile initially. Elevated LDH, d-dimer, LDH, procal, ESR, CRP.   CXR shows bilateral hilar congestion and diffuse bilateral opacities  Negative COVID, MRSA nares, Ur legionella/strep.  s/p CTX in ED and vanc x1 dose  - on HFNC, wean as tolerated  - holding Lasix gtt, as pt seems euvolemic  - pulmonlogy consulted, appreciate recs        - send sputum for culture & PCP        - proceed with CT chest        - no bronchoscopy at this time  - ID consulted, appreciate recs        - CD4 174 on current admission        - START bactrim 500mg IV q8h for empiric PCP tx        - START prednisone 40mg PO BID        - c/w zosyn 3.375g q6h for possible bacterial PNA        - f/u fungitell, TB quant    #Pneumonia (bacterial vs. PCP)  S/p CTX, decadron 10mg. BIPAP in ED with improvement in SpO2.  COVID, MRSA nares, Ur legionella/strep neg.  Elevated Procal 5.27,   - AHRF plan, as above  - ID & pulmonology following, as above    GI  #Transaminitis  May be i/s/o acute HF exacerbation with poor perfusion transaminitis lactate) or hepatitis (i/s/o immunocompromise from HIV). Denied EtOH. Hepatitis panel wnl.  - trend CMP  - f/u remaining hepatitis labs (HBV cAb total/sAb/sAg, HCV PCR)    #Colon Cancer s/p Resection  Resection in 2016. Colostomy bag in place. Follows with Dr. Guilherme Travis with [facility]. No active disease.  - outpatient follow-up    RENAL  #KULWINDER vs KULWINDER on CKD  Elevated Cr. Unknown baseline. Likely pre-renal as also with lactate, transaminitis in acute heart failure exacerbation. FeUrea pre-renal.  - continue to trend creatinine  - avoid nephrotoxic medications    HEME  #Leukocytosis  WBC 13.87 on admission and febrile to 102.5. Procal 5.2. Being treated for possible CAP vs. PCP PNA. Previously on decadron earlier in course.  - on prednisone for empiric PCP tx  - abx plan, as above  - continue to trend    ENDO  - A1c 5.2, no PMH of diabetes  - low ISS  - on prednisone for empiric PCP tx  - FSG before meals and at bedtime    ID  #Severe sepsis  Presented with lactate 3.8, tachypnea, leukocytosis with neutrophilic predominance and fever.   Likely due to PNA vs acute decompensated HF vs AHRF due to COVID. Less likely from UTI as denies urinary symptoms. Lactate now cleared.  s/p CTX 1gr in ED. s/p vanc x1. RVP, COVID, MRSA nares negative.  - c/w zosyn 3.375g q6h  - START bactrim for empiric PCP tx, as above  - f/u blood culture, UCx  - will send sputum cx, PCP sputum    #HIV  Febrile on admission. PMH HIV (dx 2010). States compliance on Biktarvy.   Per pt, last sexual encounter was over 1 year ago. Follows at [facility].  - current CD4 178, VL 50,732  - per collateral, previous CD4 671,  (12/2021)  - c/w home Biktarvy  - HIV team consulted, appreciate recs    #Asymptomatic e. coli bacteriuria  Denies dysuria, urinary frequency/urgency. No suprapubic/CVA tenderness. Urine: small LE, neg nitrites. WBC>10. Ucx shows e. coli >100k.  - on abx for PNA, as above      PROPHYLAXIS  Fluids: None  Electrolytes: replete as necessary, K>4, Mg>2  Nutrition: DASH  Bowel Regimen: None  DVT ppx: Heparin gtt  GI ppx: Pantoprazole 40mg IV   Code: Full  Disposition: CCU

## 2022-04-05 NOTE — PROGRESS NOTE ADULT - SUBJECTIVE AND OBJECTIVE BOX
CCU Progress Note  Jon Jon, PGY-1  Pager: [phone]    ******INCOMPLETE******    Patient is a 41y old  Male who presents with a chief complaint of Acute hypoxic respiratory failure (2022 07:16)    OVERNIGHT EVENTS/INTERVAL HPI:    REVIEW OF SYSTEMS:  All other review of systems is negative unless indicated above.    OBJECTIVE:  T(C): 36.8 (22 @ 05:56), Max: 36.8 (22 @ 05:56)  HR: 85 (22 @ 06:00) (78 - 94)  BP: 91/53 (22 @ 06:00) (85/51 - 108/68)  RR: 28 (22 @ 06:00) (18 - 36)  SpO2: 93% (22 @ 06:00) (91% - 97%)  Daily     Daily     Physical Exam:  General: in no acute distress  Eyes: EOMI intact bilaterally. Anicteric sclerae, moist conjunctivae  HENT: Moist mucous membranes  Neck: Trachea midline, supple  Lungs: CTA B/L. No wheezes, rales, or ronchi  Cardiovascular: RRR. No murmurs, rubs, or gallops  Abdomen: Soft, non-tender non-distended; No rebound or guarding  Extremities: Normal range of motion, No clubbing, cyanosis or edema  MSK: No midline bony tenderness. No CVA tenderness bilaterally  Neurological: Alert and oriented x3  Skin: Warm and dry. No obvious rash     Medications:  MEDICATIONS  (STANDING):  atorvastatin 80 milliGRAM(s) Oral at bedtime  bictegravir 50 mG/emtricitabine 200 mG/tenofovir alafenamide 25 mG (BIKTARVY) 1 Tablet(s) Oral daily  chlorhexidine 2% Cloths 1 Application(s) Topical <User Schedule>  clopidogrel Tablet 75 milliGRAM(s) Oral daily  dextrose 5%. 1000 milliLiter(s) (100 mL/Hr) IV Continuous <Continuous>  dextrose 5%. 1000 milliLiter(s) (50 mL/Hr) IV Continuous <Continuous>  dextrose 50% Injectable 25 Gram(s) IV Push once  dextrose 50% Injectable 12.5 Gram(s) IV Push once  dextrose 50% Injectable 25 Gram(s) IV Push once  glucagon  Injectable 1 milliGRAM(s) IntraMuscular once  heparin  Infusion 2300 Unit(s)/Hr (23 mL/Hr) IV Continuous <Continuous>  insulin lispro (ADMELOG) corrective regimen sliding scale   SubCutaneous Before meals and at bedtime  pantoprazole    Tablet 40 milliGRAM(s) Oral before breakfast  piperacillin/tazobactam IVPB.. 3.375 Gram(s) IV Intermittent every 6 hours  predniSONE   Tablet 40 milliGRAM(s) Oral every 12 hours  trimethoprim / sulfamethoxazole IVPB 500 milliGRAM(s) IV Intermittent every 8 hours    MEDICATIONS  (PRN):  dextrose Oral Gel 15 Gram(s) Oral once PRN Blood Glucose LESS THAN 70 milliGRAM(s)/deciliter      Labs:                        11.0   16.48 )-----------( 284      ( 2022 05:49 )             31.6     04-05    134<L>  |  98  |  23  ----------------------------<  148<H>  4.2   |  26  |  1.49<H>    Ca    8.0<L>      2022 05:49  Phos  2.7     04-05  Mg     2.2     04-05    TPro  6.6  /  Alb  2.6<L>  /  TBili  0.6  /  DBili  x   /  AST  58<H>  /  ALT  70<H>  /  AlkPhos  60  04-05    PT/INR - ( 2022 03:29 )   PT: 21.8 sec;   INR: 1.82          PTT - ( 2022 05:49 )  PTT:53.9 sec  Urinalysis Basic - ( 2022 02:44 )    Color: Yellow / Appearance: Clear / S.020 / pH: x  Gluc: x / Ketone: NEGATIVE  / Bili: Negative / Urobili: 1.0 E.U./dL   Blood: x / Protein: NEGATIVE mg/dL / Nitrite: NEGATIVE   Leuk Esterase: Small / RBC: 5-10 /HPF / WBC > 10 /HPF   Sq Epi: x / Non Sq Epi: 0-5 /HPF / Bacteria: Present /HPF      SARS-CoV-2: NotDetec (2022 22:52)  COVID-19 PCR: NotDetec (2022 03:33)      Radiology: Reviewed CCU Progress Note  Jon Dontrell, PGY-1  Pager: [phone]    ******INCOMPLETE******    Patient is a 41y old  Male who presents with a chief complaint of Acute hypoxic respiratory failure (2022 07:16)    OVERNIGHT EVENTS/INTERVAL HPI:    /: Cr elevated for PE protocol. MRSA, [place], strep ag negative. d/c'ed decadron. weaned to HF 50L/50%. started DASH/TLC, renal, 1500cc diet. hep increased to 14cc/hr at 11am. COVID neg x2. LE dopplers neg for DVT. per collateral, pLAD stent with re-thrombosis no intervention done, EF 15-20%. No reported thrombus on repeat echo. 4pm weaned to 40L. TTE showed dilated LV, EF 10-15% global hypokinesis, grade 3 LV diastolic dysfunction, severe LA dilation, mild/mod MR/TR. CD4 178 but VL pending. Consulted ID, started empiric tx with bactrim 500mg IV q8h and prednisone 40mg BID x5 days (will need taper course). Fungitell was send out (shows collected but lab confirmed receiving it). Inducing sputum for PCR. PTT 38, increased rate to 19.    O/n: lactate 1.6. ABG acceptable. L radial a-line placed. MAp 61-64 SBP 70s-85 by [place] and NIBP. lasix stopped at 10PM per fellow for soft BPs. IVC was normal also. Sputum sent. RVP and crypto osent per ID recs Dr. Dempsey. RVP negative. PTT 45, increase rate to 21. All night BP soft and MAP 61-65 asymptomatic. perefered to not pusshing for the CT chest (unstable). called lab, confirmed that they received Quant and G6PD. PTT sub therapeutic. heparin changed 21 to 23.       REVIEW OF SYSTEMS:  All other review of systems is negative unless indicated above.    OBJECTIVE:  T(C): 36.8 (22 @ 05:56), Max: 36.8 (22 @ 05:56)  HR: 85 (22 @ 06:00) (78 - 94)  BP: 91/53 (22 @ 06:00) (85/51 - 108/68)  RR: 28 (22 @ 06:00) (18 - 36)  SpO2: 93% (22 @ 06:00) (91% - 97%)  Daily     Daily     Physical Exam:  General: in no acute distress  Eyes: EOMI intact bilaterally. Anicteric sclerae, moist conjunctivae  HENT: Moist mucous membranes  Neck: Trachea midline, supple  Lungs: CTA B/L. No wheezes, rales, or ronchi  Cardiovascular: RRR. No murmurs, rubs, or gallops  Abdomen: Soft, non-tender non-distended; No rebound or guarding  Extremities: Normal range of motion, No clubbing, cyanosis or edema  MSK: No midline bony tenderness. No CVA tenderness bilaterally  Neurological: Alert and oriented x3  Skin: Warm and dry. No obvious rash     Medications:  MEDICATIONS  (STANDING):  atorvastatin 80 milliGRAM(s) Oral at bedtime  bictegravir 50 mG/emtricitabine 200 mG/tenofovir alafenamide 25 mG (BIKTARVY) 1 Tablet(s) Oral daily  chlorhexidine 2% Cloths 1 Application(s) Topical <User Schedule>  clopidogrel Tablet 75 milliGRAM(s) Oral daily  dextrose 5%. 1000 milliLiter(s) (100 mL/Hr) IV Continuous <Continuous>  dextrose 5%. 1000 milliLiter(s) (50 mL/Hr) IV Continuous <Continuous>  dextrose 50% Injectable 25 Gram(s) IV Push once  dextrose 50% Injectable 12.5 Gram(s) IV Push once  dextrose 50% Injectable 25 Gram(s) IV Push once  glucagon  Injectable 1 milliGRAM(s) IntraMuscular once  heparin  Infusion 2300 Unit(s)/Hr (23 mL/Hr) IV Continuous <Continuous>  insulin lispro (ADMELOG) corrective regimen sliding scale   SubCutaneous Before meals and at bedtime  pantoprazole    Tablet 40 milliGRAM(s) Oral before breakfast  piperacillin/tazobactam IVPB.. 3.375 Gram(s) IV Intermittent every 6 hours  predniSONE   Tablet 40 milliGRAM(s) Oral every 12 hours  trimethoprim / sulfamethoxazole IVPB 500 milliGRAM(s) IV Intermittent every 8 hours    MEDICATIONS  (PRN):  dextrose Oral Gel 15 Gram(s) Oral once PRN Blood Glucose LESS THAN 70 milliGRAM(s)/deciliter      Labs:                        11.0   16.48 )-----------( 284      ( 2022 05:49 )             31.6     04-05    134<L>  |  98  |  23  ----------------------------<  148<H>  4.2   |  26  |  1.49<H>    Ca    8.0<L>      2022 05:49  Phos  2.7     04-05  Mg     2.2     04-05    TPro  6.6  /  Alb  2.6<L>  /  TBili  0.6  /  DBili  x   /  AST  58<H>  /  ALT  70<H>  /  AlkPhos  60  04-05    PT/INR - ( 2022 03:29 )   PT: 21.8 sec;   INR: 1.82          PTT - ( 2022 05:49 )  PTT:53.9 sec  Urinalysis Basic - ( 2022 02:44 )    Color: Yellow / Appearance: Clear / S.020 / pH: x  Gluc: x / Ketone: NEGATIVE  / Bili: Negative / Urobili: 1.0 E.U./dL   Blood: x / Protein: NEGATIVE mg/dL / Nitrite: NEGATIVE   Leuk Esterase: Small / RBC: 5-10 /HPF / WBC > 10 /HPF   Sq Epi: x / Non Sq Epi: 0-5 /HPF / Bacteria: Present /HPF      SARS-CoV-2: NotDetec (2022 22:52)  COVID-19 PCR: NotDetec (2022 03:33)      Radiology: Reviewed CCU Progress Note  Jon Jon, PGY-1  Pager: [phone]    ******INCOMPLETE******    Patient is a 41y old  Male who presents with a chief complaint of Acute hypoxic respiratory failure (2022 07:16)    OVERNIGHT EVENTS/INTERVAL HPI:    : LE dopplers neg for DVT. 4pm weaned to 40L. [facility] TTE showed dilated LV, EF 10-15% global hypokinesis, grade 3 LV diastolic dysfunction, severe LA dilation, mild/mod MR/TR. CD4 178 but VL pending.     Consulted ID, started empiric tx with bactrim 500mg IV q8h and prednisone 40mg BID x5 days (will need taper course). Fungitell, quant, G6PD, crypto were sent out (shows collected but lab confirmed receiving it). Inducing sputum for PCR.    O/n: lactate 1.6. ABG acceptable. L radial a-line placed. MAp 61-64 SBP 70s-85 by A-line and NIBP. lasix stopped at 10PM for soft BPs. IVC was normal also. Sputum sent. repeat RVP negative. All night BP soft and MAP 61-65 asymptomatic. preferred to not pushing for the CT chest (unstable).     REVIEW OF SYSTEMS:  All other review of systems is negative unless indicated above.    OBJECTIVE:  T(C): 36.8 (22 @ 05:56), Max: 36.8 (22 @ 05:56)  HR: 85 (22 @ 06:00) (78 - 94)  BP: 91/53 (22 @ 06:00) (85/51 - 108/68)  RR: 28 (22 @ 06:00) (18 - 36)  SpO2: 93% (22 @ 06:00) (91% - 97%)    Physical Exam:  General: in no acute distress, resting comfortably on BIPAP  Eyes: EOMI intact bilaterally. Anicteric sclerae, moist conjunctivae. Yellow crusting on left lower eyelid. No ptosis.  HENT: Moist mucous membranes. Poor dentition.  Neck: Trachea midline, supple  Lungs: Equal breath sounds B/L. No wheezes or ronchi. Bibasilar rales. No accessory muscle use.  Cardiovascular: RRR. No murmurs, rubs, or gallops  Abdomen: Obese abdomen. Soft, non-tender non-distended; No rebound or guarding. Ostomy bag in place C/D/I without surrounding signs of infection.  Extremities: WWP, No clubbing, cyanosis. 2+ pitting edema. No calf tenderness bilaterally.  MSK: No midline bony tenderness. No CVA tenderness bilaterally.  Neurological: Alert and oriented x3  Skin: Warm and dry. No obvious rash. Venous stasis changes on b/l LE.    Medications:  MEDICATIONS  (STANDING):  atorvastatin 80 milliGRAM(s) Oral at bedtime  bictegravir 50 mG/emtricitabine 200 mG/tenofovir alafenamide 25 mG (BIKTARVY) 1 Tablet(s) Oral daily  chlorhexidine 2% Cloths 1 Application(s) Topical <User Schedule>  clopidogrel Tablet 75 milliGRAM(s) Oral daily  dextrose 5%. 1000 milliLiter(s) (100 mL/Hr) IV Continuous <Continuous>  dextrose 5%. 1000 milliLiter(s) (50 mL/Hr) IV Continuous <Continuous>  dextrose 50% Injectable 25 Gram(s) IV Push once  dextrose 50% Injectable 12.5 Gram(s) IV Push once  dextrose 50% Injectable 25 Gram(s) IV Push once  glucagon  Injectable 1 milliGRAM(s) IntraMuscular once  heparin  Infusion 2300 Unit(s)/Hr (23 mL/Hr) IV Continuous <Continuous>  insulin lispro (ADMELOG) corrective regimen sliding scale   SubCutaneous Before meals and at bedtime  pantoprazole    Tablet 40 milliGRAM(s) Oral before breakfast  piperacillin/tazobactam IVPB.. 3.375 Gram(s) IV Intermittent every 6 hours  predniSONE   Tablet 40 milliGRAM(s) Oral every 12 hours  trimethoprim / sulfamethoxazole IVPB 500 milliGRAM(s) IV Intermittent every 8 hours    MEDICATIONS  (PRN):  dextrose Oral Gel 15 Gram(s) Oral once PRN Blood Glucose LESS THAN 70 milliGRAM(s)/deciliter      Labs:                        11.0   16.48 )-----------( 284      ( 2022 05:49 )             31.6     04-05    134<L>  |  98  |  23  ----------------------------<  148<H>  4.2   |  26  |  1.49<H>    Ca    8.0<L>      2022 05:49  Phos  2.7     04-05  Mg     2.2     04-05    TPro  6.6  /  Alb  2.6<L>  /  TBili  0.6  /  DBili  x   /  AST  58<H>  /  ALT  70<H>  /  AlkPhos  60  04-05    PT/INR - ( 2022 03:29 )   PT: 21.8 sec;   INR: 1.82     PTT - ( 2022 05:49 )  PTT:53.9 sec    Urinalysis Basic - ( 2022 02:44 )    Color: Yellow / Appearance: Clear / S.020 / pH: x  Gluc: x / Ketone: NEGATIVE  / Bili: Negative / Urobili: 1.0 E.U./dL   Blood: x / Protein: NEGATIVE mg/dL / Nitrite: NEGATIVE   Leuk Esterase: Small / RBC: 5-10 /HPF / WBC > 10 /HPF   Sq Epi: x / Non Sq Epi: 0-5 /HPF / Bacteria: Present /HPF    SARS-CoV-2: NotDetec (2022 22:52)  COVID-19 PCR: NotDetec (2022 03:33)    Radiology: Reviewed CCU Progress Note  Jon Jon, PGY-1  Pager: [phone]    ******INCOMPLETE******    Patient is a 41y old  Male who presents with a chief complaint of Acute hypoxic respiratory failure (2022 07:16)    OVERNIGHT EVENTS/INTERVAL HPI:     : LE dopplers neg for DVT. 4pm weaned to 40L. [facility] TTE showed dilated LV, EF 10-15% global hypokinesis, grade 3 LV diastolic dysfunction, severe LA dilation, mild/mod MR/TR. CD4 178 but VL pending.     Consulted ID, started empiric tx with bactrim 500mg IV q8h and prednisone 40mg BID x5 days (will need taper course). Fungitell, quant, G6PD, crypto were sent out (shows collected but lab confirmed receiving it). Inducing sputum for PCR.    O/n: lactate 1.6. L radial a-line placed. MAp 61-64 SBP 70s-85 by A-line and NIBP. lasix stopped at 10PM for soft BPs. IVC was normal also. Sputum sent. repeat RVP negative. All night BP soft and MAP 61-65 asymptomatic.    As per night team, no overnight events.     Patient seen and examined at bedside. ________________. Otherwise, no complaints at this time. Patient denies fevers, sweats, chills, SOB, dyspnea, chest pain, nausea/vomiting, diarrhea, constipation, abdominal pain. 12 pt ROS otherwise negative.     REVIEW OF SYSTEMS:  All other review of systems is negative unless indicated above.    OBJECTIVE:  T(C): 36.8 (22 @ 05:56), Max: 36.8 (22 @ 05:56)  HR: 85 (22 @ 06:00) (78 - 94)  BP: 91/53 (22 @ 06:00) (85/51 - 108/68)  RR: 28 (22 @ 06:00) (18 - 36)  SpO2: 93% (22 @ 06:00) (91% - 97%)    Physical Exam:  General: in no acute distress, resting comfortably on HFNC 40/50%  Eyes: EOMI intact bilaterally. Anicteric sclerae, moist conjunctivae. Yellow crusting on left lower eyelid. No ptosis.  HENT: Moist mucous membranes. Poor dentition.  Neck: Trachea midline, supple  Lungs: Equal breath sounds B/L. No wheezes or ronchi. Bibasilar rales. No accessory muscle use.  Cardiovascular: RRR. No murmurs, rubs, or gallops  Abdomen: Obese abdomen. Soft, non-tender non-distended; No rebound or guarding. Ostomy bag in place C/D/I without surrounding signs of infection.  Extremities: WWP, No clubbing, cyanosis. 2+ pitting edema. No calf tenderness bilaterally.  MSK: No midline bony tenderness. No CVA tenderness bilaterally.  Neurological: Alert and oriented x3  Skin: Warm and dry. No obvious rash. Venous stasis changes on b/l LE.    Medications:  MEDICATIONS  (STANDING):  atorvastatin 80 milliGRAM(s) Oral at bedtime  bictegravir 50 mG/emtricitabine 200 mG/tenofovir alafenamide 25 mG (BIKTARVY) 1 Tablet(s) Oral daily  chlorhexidine 2% Cloths 1 Application(s) Topical <User Schedule>  clopidogrel Tablet 75 milliGRAM(s) Oral daily  dextrose 5%. 1000 milliLiter(s) (100 mL/Hr) IV Continuous <Continuous>  dextrose 5%. 1000 milliLiter(s) (50 mL/Hr) IV Continuous <Continuous>  dextrose 50% Injectable 25 Gram(s) IV Push once  dextrose 50% Injectable 12.5 Gram(s) IV Push once  dextrose 50% Injectable 25 Gram(s) IV Push once  glucagon  Injectable 1 milliGRAM(s) IntraMuscular once  heparin  Infusion 2300 Unit(s)/Hr (23 mL/Hr) IV Continuous <Continuous>  insulin lispro (ADMELOG) corrective regimen sliding scale   SubCutaneous Before meals and at bedtime  pantoprazole    Tablet 40 milliGRAM(s) Oral before breakfast  piperacillin/tazobactam IVPB.. 3.375 Gram(s) IV Intermittent every 6 hours  predniSONE   Tablet 40 milliGRAM(s) Oral every 12 hours  trimethoprim / sulfamethoxazole IVPB 500 milliGRAM(s) IV Intermittent every 8 hours    MEDICATIONS  (PRN):  dextrose Oral Gel 15 Gram(s) Oral once PRN Blood Glucose LESS THAN 70 milliGRAM(s)/deciliter      Labs:                        11.0   16.48 )-----------( 284      ( 2022 05:49 )             31.6     04-05    134<L>  |  98  |  23  ----------------------------<  148<H>  4.2   |  26  |  1.49<H>    Ca    8.0<L>      2022 05:49  Phos  2.7     04-05  Mg     2.2     04-05    TPro  6.6  /  Alb  2.6<L>  /  TBili  0.6  /  DBili  x   /  AST  58<H>  /  ALT  70<H>  /  AlkPhos  60  04-05    PT/INR - ( 2022 03:29 )   PT: 21.8 sec;   INR: 1.82     PTT - ( 2022 05:49 )  PTT:53.9 sec    Urinalysis Basic - ( 2022 02:44 )    Color: Yellow / Appearance: Clear / S.020 / pH: x  Gluc: x / Ketone: NEGATIVE  / Bili: Negative / Urobili: 1.0 E.U./dL   Blood: x / Protein: NEGATIVE mg/dL / Nitrite: NEGATIVE   Leuk Esterase: Small / RBC: 5-10 /HPF / WBC > 10 /HPF   Sq Epi: x / Non Sq Epi: 0-5 /HPF / Bacteria: Present /HPF    SARS-CoV-2: NotDetec (2022 22:52)  COVID-19 PCR: NotDetec (2022 03:33)    Radiology: Reviewed CCU Progress Note  Jon Dontrell, PGY-1  Pager: [phone]    Patient is a 41y old  Male who presents with a chief complaint of Acute hypoxic respiratory failure (2022 07:16)    OVERNIGHT EVENTS/INTERVAL HPI: US LE negative for DVT. TTE shows EF 10-15%, no LV thrombus, dilated LV, severely dilated LA, mild/mod TR/MR. CD4 178. ID consulted, started bactrim & prednisone. Fungitell, quant, G6PD, crypto pending results. L radial a-line placed. Lasix gtt held I/S/O soft BP. Repeat RVP negative.     Patient seen and examined at bedside. Resting comfortably in bed, subjectively feels much improved since admission. Patient denies fevers, sweats, chills, SOB, dyspnea, chest pain, nausea/vomiting, abdominal pain.    REVIEW OF SYSTEMS:  All other review of systems is negative unless indicated above.    OBJECTIVE:  T(C): 36.8 (22 @ 05:56), Max: 36.8 (22 @ 05:56)  HR: 85 (22 @ 06:00) (78 - 94)  BP: 91/53 (22 @ 06:00) (85/51 - 108/68)  RR: 28 (22 @ 06:00) (18 - 36)  SpO2: 93% (22 @ 06:00) (91% - 97%)    Physical Exam:  General: in no acute distress, resting comfortably on HFNC 40/50%  Eyes: EOMI intact bilaterally. Anicteric sclerae, moist conjunctivae.  HENT: Moist mucous membranes. Poor dentition.  Neck: Trachea midline, supple  Lungs: Equal breath sounds B/L. No wheezes or ronchi. Mild bibasilar rales. No accessory muscle use.  Cardiovascular: RRR. No murmurs, rubs, or gallops.  Abdomen: Obese abdomen. Soft, non-tender non-distended; No rebound or guarding. Ostomy bag in place C/D/I without surrounding signs of infection.  Extremities: WWP, No clubbing, cyanosis. Trace LE edema. No calf tenderness bilaterally.  Neurological: Alert and oriented x3  Skin: Warm and dry. No obvious rash. Venous stasis changes on b/l LE.    Medications:  MEDICATIONS  (STANDING):  atorvastatin 80 milliGRAM(s) Oral at bedtime  bictegravir 50 mG/emtricitabine 200 mG/tenofovir alafenamide 25 mG (BIKTARVY) 1 Tablet(s) Oral daily  chlorhexidine 2% Cloths 1 Application(s) Topical <User Schedule>  clopidogrel Tablet 75 milliGRAM(s) Oral daily  dextrose 5%. 1000 milliLiter(s) (100 mL/Hr) IV Continuous <Continuous>  dextrose 5%. 1000 milliLiter(s) (50 mL/Hr) IV Continuous <Continuous>  dextrose 50% Injectable 25 Gram(s) IV Push once  dextrose 50% Injectable 12.5 Gram(s) IV Push once  dextrose 50% Injectable 25 Gram(s) IV Push once  glucagon  Injectable 1 milliGRAM(s) IntraMuscular once  heparin  Infusion 2300 Unit(s)/Hr (23 mL/Hr) IV Continuous <Continuous>  insulin lispro (ADMELOG) corrective regimen sliding scale   SubCutaneous Before meals and at bedtime  pantoprazole    Tablet 40 milliGRAM(s) Oral before breakfast  piperacillin/tazobactam IVPB.. 3.375 Gram(s) IV Intermittent every 6 hours  predniSONE   Tablet 40 milliGRAM(s) Oral every 12 hours  trimethoprim / sulfamethoxazole IVPB 500 milliGRAM(s) IV Intermittent every 8 hours    MEDICATIONS  (PRN):  dextrose Oral Gel 15 Gram(s) Oral once PRN Blood Glucose LESS THAN 70 milliGRAM(s)/deciliter      Labs:                        11.0   16.48 )-----------( 284      ( 2022 05:49 )             31.6     04-05    134<L>  |  98  |  23  ----------------------------<  148<H>  4.2   |  26  |  1.49<H>    Ca    8.0<L>      2022 05:49  Phos  2.7     04-05  Mg     2.2     04-05    TPro  6.6  /  Alb  2.6<L>  /  TBili  0.6  /  DBili  x   /  AST  58<H>  /  ALT  70<H>  /  AlkPhos  60  04-05    PT/INR - ( 2022 03:29 )   PT: 21.8 sec;   INR: 1.82     PTT - ( 2022 05:49 )  PTT:53.9 sec    Urinalysis Basic - ( 2022 02:44 )    Color: Yellow / Appearance: Clear / S.020 / pH: x  Gluc: x / Ketone: NEGATIVE  / Bili: Negative / Urobili: 1.0 E.U./dL   Blood: x / Protein: NEGATIVE mg/dL / Nitrite: NEGATIVE   Leuk Esterase: Small / RBC: 5-10 /HPF / WBC > 10 /HPF   Sq Epi: x / Non Sq Epi: 0-5 /HPF / Bacteria: Present /HPF    SARS-CoV-2: NotDetec (2022 22:52)  COVID-19 PCR: NotDetec (2022 03:33)    Radiology: Reviewed CCU Progress Note  Jon Dontrell, PGY-1  Pager: [phone]    Patient is a 41y old  Male who presents with a chief complaint of Acute hypoxic respiratory failure (2022 07:16)    OVERNIGHT EVENTS/INTERVAL HPI: US LE negative for DVT. TTE shows EF 10-15%, no LV thrombus, dilated LV, severely dilated LA, mild/mod TR/MR. CD4 178. ID consulted, started bactrim & prednisone. Fungitell, quant, G6PD, crypto pending results. L radial a-line placed. Lasix gtt held I/S/O soft BP. Repeat RVP negative.     Patient seen and examined at bedside. Resting comfortably in bed, subjectively feels much improved since admission. Still with mild non-productive cough, but denies SOB or dyspnea on HFNC. Patient denies fevers, sweats, chills, SOB, dyspnea, chest pain, nausea/vomiting, abdominal pain.    REVIEW OF SYSTEMS:  All other review of systems is negative unless indicated above.    OBJECTIVE:  T(C): 36.8 (22 @ 05:56), Max: 36.8 (22 @ 05:56)  HR: 85 (22 @ 06:00) (78 - 94)  BP: 91/53 (22 @ 06:00) (85/51 - 108/68)  RR: 28 (22 @ 06:00) (18 - 36)  SpO2: 93% (22 @ 06:00) (91% - 97%)    Physical Exam:  General: in no acute distress, resting comfortably on HFNC 40/50%  Eyes: EOMI intact bilaterally. Anicteric sclerae, moist conjunctivae.  HENT: Moist mucous membranes. Poor dentition.  Neck: Trachea midline, supple  Lungs: Equal breath sounds B/L. No wheezes or ronchi. Mild bibasilar rales. No accessory muscle use.  Cardiovascular: RRR. No murmurs, rubs, or gallops.  Abdomen: Obese abdomen. Soft, non-tender non-distended; No rebound or guarding. Ostomy bag in place C/D/I without surrounding signs of infection.  Extremities: WWP, No clubbing, cyanosis. Trace LE edema. No calf tenderness bilaterally.  Neurological: Alert and oriented x3  Skin: Warm and dry. No obvious rash. Venous stasis changes on b/l LE.    Medications:  MEDICATIONS  (STANDING):  atorvastatin 80 milliGRAM(s) Oral at bedtime  bictegravir 50 mG/emtricitabine 200 mG/tenofovir alafenamide 25 mG (BIKTARVY) 1 Tablet(s) Oral daily  chlorhexidine 2% Cloths 1 Application(s) Topical <User Schedule>  clopidogrel Tablet 75 milliGRAM(s) Oral daily  dextrose 5%. 1000 milliLiter(s) (100 mL/Hr) IV Continuous <Continuous>  dextrose 5%. 1000 milliLiter(s) (50 mL/Hr) IV Continuous <Continuous>  dextrose 50% Injectable 25 Gram(s) IV Push once  dextrose 50% Injectable 12.5 Gram(s) IV Push once  dextrose 50% Injectable 25 Gram(s) IV Push once  glucagon  Injectable 1 milliGRAM(s) IntraMuscular once  heparin  Infusion 2300 Unit(s)/Hr (23 mL/Hr) IV Continuous <Continuous>  insulin lispro (ADMELOG) corrective regimen sliding scale   SubCutaneous Before meals and at bedtime  pantoprazole    Tablet 40 milliGRAM(s) Oral before breakfast  piperacillin/tazobactam IVPB.. 3.375 Gram(s) IV Intermittent every 6 hours  predniSONE   Tablet 40 milliGRAM(s) Oral every 12 hours  trimethoprim / sulfamethoxazole IVPB 500 milliGRAM(s) IV Intermittent every 8 hours    MEDICATIONS  (PRN):  dextrose Oral Gel 15 Gram(s) Oral once PRN Blood Glucose LESS THAN 70 milliGRAM(s)/deciliter      Labs:                        11.0   16.48 )-----------( 284      ( 2022 05:49 )             31.6     04-05    134<L>  |  98  |  23  ----------------------------<  148<H>  4.2   |  26  |  1.49<H>    Ca    8.0<L>      2022 05:49  Phos  2.7     04-05  Mg     2.2     04-05    TPro  6.6  /  Alb  2.6<L>  /  TBili  0.6  /  DBili  x   /  AST  58<H>  /  ALT  70<H>  /  AlkPhos  60  04-05    PT/INR - ( 2022 03:29 )   PT: 21.8 sec;   INR: 1.82     PTT - ( 2022 05:49 )  PTT:53.9 sec    Urinalysis Basic - ( 2022 02:44 )    Color: Yellow / Appearance: Clear / S.020 / pH: x  Gluc: x / Ketone: NEGATIVE  / Bili: Negative / Urobili: 1.0 E.U./dL   Blood: x / Protein: NEGATIVE mg/dL / Nitrite: NEGATIVE   Leuk Esterase: Small / RBC: 5-10 /HPF / WBC > 10 /HPF   Sq Epi: x / Non Sq Epi: 0-5 /HPF / Bacteria: Present /HPF    SARS-CoV-2: NotDetec (2022 22:52)  COVID-19 PCR: NotDetec (2022 03:33)    Radiology: Reviewed CCU Progress Note  Jon Jon, PGY-1  Pager: [phone]    Patient is a 41y old  Male who presents with a chief complaint of Acute hypoxic respiratory failure (2022 07:16)    HOSPITAL COURSE:  42 y/o M with PMH CAD (MI 2022, s/p 1 stent placed to pLAD, restenosed), hx left heart thrombus (on Xarelto, no longer visualized since 2022), HFrEF (EF 10-15% on current admission), HIV (?compliance on Biktarvy), hx colon CA (s/p resection with chemo , PAAWN) BIBEMS for worsening SOB x2 days, found to be in acute hypoxic respiratory failure with SpO2 to 60% on RA, improved to 97% on BIPAP. Also febrile to 102.5. CXR showed significant diffuse bilateral opacification. for Trop I 170, ,  ProBNP 34,267, wbc 13.87, lactate 3.8, Hb 11.5, VBG with respiratory alkalosis. In ED, was started on CTX, decadron, morphine, nitro gtt. Given Lasix 80m gwith 1200cc UOP. Accepted to CCU for management of AHRF I/S/O CHF exacerbation +/- PNA. Upon admission, vanc/zosyn started for empiric coverage. Lasix gtt @5mg/hr started for volume overload on exam. Decadron discontinued to monitor clinical course on diuretics alone. Vanc discontinued after 1 dose for negative MRSA. Pt was transitioned to HFNC. UA+ for small LE, no nitrites. Ucx showing e. coli, however, pt denies any urinary symptoms. Repeat TTE showed EF 10-15%, dilated LV, severely dilated LA, mild/mod TR/MR, and no remaining thrombus. Biktarvy resumed. HIV team consulted. Repeat CD4 178, VL 50,732 (previously CD4 674,  in 2022 per primary care provider). ID consulted, started on empiric PCP tx with Bactrim and prednisone. Pulmonology consulted, will send for PCP sputum, sputum cx. Awaiting CT chest. On , pt appears much more euvolemic on exam, Lasix gtt held I/S/O soft BP. Brief episode of asymptomatic SVT, aborted with carotid massage and Valsalva. Transitioning to 6LNC. Now awaiting possible bronchoscopy on Thursday to further eval PCP.  -------------------------    OVERNIGHT EVENTS/INTERVAL HPI: US LE negative for DVT. TTE shows EF 10-15%, no LV thrombus, dilated LV, severely dilated LA, mild/mod TR/MR. CD4 178. ID consulted, started bactrim & prednisone. Fungitell, quant, G6PD, crypto pending results. L radial a-line placed. Lasix gtt held I/S/O soft BP. Repeat RVP negative.     Patient seen and examined at bedside. Resting comfortably in bed, subjectively feels much improved since admission. Still with mild non-productive cough, but denies SOB or dyspnea on HFNC. Patient denies fevers, sweats, chills, SOB, dyspnea, chest pain, nausea/vomiting, abdominal pain.    REVIEW OF SYSTEMS:  All other review of systems is negative unless indicated above.    OBJECTIVE:  T(C): 36.8 (22 @ 05:56), Max: 36.8 (22 @ 05:56)  HR: 85 (22 @ 06:00) (78 - 94)  BP: 91/53 (22 @ 06:00) (85/51 - 108/68)  RR: 28 (22 @ 06:00) (18 - 36)  SpO2: 93% (22 @ 06:00) (91% - 97%)    Physical Exam:  General: in no acute distress, resting comfortably on HFNC 40/50%  Eyes: EOMI intact bilaterally. Anicteric sclerae, moist conjunctivae.  HENT: Moist mucous membranes. Poor dentition.  Neck: Trachea midline, supple  Lungs: Equal breath sounds B/L. No wheezes or ronchi. Mild bibasilar rales. No accessory muscle use.  Cardiovascular: RRR. No murmurs, rubs, or gallops.  Abdomen: Obese abdomen. Soft, non-tender non-distended; No rebound or guarding. Ostomy bag in place C/D/I without surrounding signs of infection.  Extremities: WWP, No clubbing, cyanosis. Trace LE edema. No calf tenderness bilaterally.  Neurological: Alert and oriented x3  Skin: Warm and dry. No obvious rash. Venous stasis changes on b/l LE.    Medications:  MEDICATIONS  (STANDING):  atorvastatin 80 milliGRAM(s) Oral at bedtime  bictegravir 50 mG/emtricitabine 200 mG/tenofovir alafenamide 25 mG (BIKTARVY) 1 Tablet(s) Oral daily  chlorhexidine 2% Cloths 1 Application(s) Topical <User Schedule>  clopidogrel Tablet 75 milliGRAM(s) Oral daily  dextrose 5%. 1000 milliLiter(s) (100 mL/Hr) IV Continuous <Continuous>  dextrose 5%. 1000 milliLiter(s) (50 mL/Hr) IV Continuous <Continuous>  dextrose 50% Injectable 25 Gram(s) IV Push once  dextrose 50% Injectable 12.5 Gram(s) IV Push once  dextrose 50% Injectable 25 Gram(s) IV Push once  glucagon  Injectable 1 milliGRAM(s) IntraMuscular once  heparin  Infusion 2300 Unit(s)/Hr (23 mL/Hr) IV Continuous <Continuous>  insulin lispro (ADMELOG) corrective regimen sliding scale   SubCutaneous Before meals and at bedtime  pantoprazole    Tablet 40 milliGRAM(s) Oral before breakfast  piperacillin/tazobactam IVPB.. 3.375 Gram(s) IV Intermittent every 6 hours  predniSONE   Tablet 40 milliGRAM(s) Oral every 12 hours  trimethoprim / sulfamethoxazole IVPB 500 milliGRAM(s) IV Intermittent every 8 hours    MEDICATIONS  (PRN):  dextrose Oral Gel 15 Gram(s) Oral once PRN Blood Glucose LESS THAN 70 milliGRAM(s)/deciliter      Labs:                        11.0   16.48 )-----------( 284      ( 2022 05:49 )             31.6     04-05    134<L>  |  98  |  23  ----------------------------<  148<H>  4.2   |  26  |  1.49<H>    Ca    8.0<L>      2022 05:49  Phos  2.7     04-05  Mg     2.2     04-05    TPro  6.6  /  Alb  2.6<L>  /  TBili  0.6  /  DBili  x   /  AST  58<H>  /  ALT  70<H>  /  AlkPhos  60  04-05    PT/INR - ( 2022 03:29 )   PT: 21.8 sec;   INR: 1.82     PTT - ( 2022 05:49 )  PTT:53.9 sec    Urinalysis Basic - ( 2022 02:44 )    Color: Yellow / Appearance: Clear / S.020 / pH: x  Gluc: x / Ketone: NEGATIVE  / Bili: Negative / Urobili: 1.0 E.U./dL   Blood: x / Protein: NEGATIVE mg/dL / Nitrite: NEGATIVE   Leuk Esterase: Small / RBC: 5-10 /HPF / WBC > 10 /HPF   Sq Epi: x / Non Sq Epi: 0-5 /HPF / Bacteria: Present /HPF    SARS-CoV-2: NotDetec (2022 22:52)  COVID-19 PCR: NotDetec (2022 03:33)    Radiology: Reviewed

## 2022-04-05 NOTE — CONSULT NOTE ADULT - ASSESSMENT
41M PMH CAD, MI (Jan 2022, stent), Left heart thrombus (Xarelto) CHF (unknown EF), colon cancer (PAWAN, s/p resection/chemo 2016), HIV (Biktarvy), former smoker (<1 pack year) BIBEMS for worsening shortness of breath for two days with nonproductive cough, admitted to CCU for acute hypoxic respiratory failure 2/2 PNA vs acute heart failure exacerbation.    #Acute Hypoxic Respiratory Failure likely 2/2 CHF exacerbation vs PNA  SOB progressing for 2 days after cold-like illness found to be in AHRF requiring BIPAP.   Febrile on admission. S/p CTX in ED and vanc x1 dose. S/p CTX, decadron 10mg. BIPAP in ED with improvement in SpO2.   Elevated LDH, d-dimer, LDH, procal, ESR, CRP.   COVID, RVP, MRSA nares, Ur legionella/strep neg.    CXR shows bilateral hilar congestion and diffuse bilateral opacification   Differentials include ARDS, flash pulmonary edema, CHF exacerbation, COVID, Pneumonia. COVID negative in ED however ARDS with decreased lymphocyte%.   Less suspicion for PCP as patient states compliant on Biktarvy. CD4 178.  - c/w HFNC, wean as tolerated  - c/w Lasix gtt, monitor UOP and CXR  - c/w Zosyn 3.375g q6h  - c/w Bactrim IVP 500mg q8h  - obtain sputum cx if produces sputum  - f/u fungitell    #Acute Decompensated Heart Failure  Known CHF. Baseline EF on 2/2022 was 15-20% with regional wall abnormality. No valvular disease.  Primary cardiologist is at [facility] (Dr. Saniya Ramirez and Dr. Marlo Kapoor)  Home meds: Entresto, torsemide, Toprol XL  Diffuse pulmonary edema on XR, volume overloaded on exam with edema, S3. S/p lasix 80mg and NG drip in ED.  EKG 4/4: Sinus tach. Nonspecific st changes. LAFB. Incomplete RBBB  Troponin I 170, ProBNP 34,267. I&O apparently compensated during this admission.   Dilated left ventriclular size.  TTE: Severely reduced left ventricular systolic function. Grade III left ventricular diastolic dysfunction. Reduced right ventricular systolic function. Severely dilated left atrium. Mild-to-moderate mitral regurgitation. Mild-to-moderate tricuspid regurgitation. Pulmonary hypertension present, pulmonary artery systolic pressure is 38 mmHg. Trivial pericardial effusion without echocardiographic evidence of   cardiac tamponade physiology.  - holding Lasix gtt I/S/O hypotension  - holding home GDMT        - holding Entresto, restart when BP stable        - holding Toprol, restart Lopressor 12.5mg when euvolemic        - holding torsemide, was on lasix gtt   - strict I&Os   41M PMH CAD, MI (Jan 2022, stent), Left heart thrombus (Xarelto) CHF (unknown EF), colon cancer (PAWAN, s/p resection/chemo 2016), HIV (Biktarvy), former smoker (<1 pack year) BIBEMS for worsening shortness of breath for two days with nonproductive cough, admitted to CCU for acute hypoxic respiratory failure 2/2 PNA vs acute heart failure exacerbation.    #Acute Hypoxic Respiratory Failure likely 2/2 CHF exacerbation vs PNA  SOB progressing for 2 days after cold-like illness found to be in AHRF requiring BIPAP.   Febrile on admission. S/p CTX in ED and vanc x1 dose. S/p CTX, decadron 10mg. BIPAP in ED with improvement in SpO2.   Elevated LDH, d-dimer, LDH, procal, ESR, CRP.   COVID, RVP, MRSA nares, Ur legionella/strep neg.    CXR shows bilateral hilar congestion and diffuse bilateral opacification   Differentials include ARDS, flash pulmonary edema, CHF exacerbation, COVID, Pneumonia. COVID negative in ED however ARDS with decreased lymphocyte%.   Less suspicion for PCP as patient states compliant on Biktarvy. CD4 178.  - c/w HFNC, wean as tolerated  - c/w Lasix gtt, monitor UOP and CXR  - c/w Zosyn 3.375g q6h  - c/w Bactrim IVP 500mg q8h  - obtain induced sputum Cx if produces sputum  - order sputum AFB smear/cuture, serum CryptoAg, urinary histoplasmosis antigen  - will consider bronchoscopy after evaluating HRCT (still pending)  - f/u fungitell    #Acute Decompensated Heart Failure  Known CHF. Baseline EF on 2/2022 was 15-20% with regional wall abnormality. No valvular disease.  Primary cardiologist is at [facility] (Dr. Saniya Ramirez and Dr. Marlo Kapoor)  Home meds: Entresto, torsemide, Toprol XL  Diffuse pulmonary edema on XR, volume overloaded on exam with edema, S3. S/p lasix 80mg and NG drip in ED.  EKG 4/4: Sinus tach. Nonspecific st changes. LAFB. Incomplete RBBB  Troponin I 170, ProBNP 34,267. I&O apparently compensated during this admission.   Dilated left ventriclular size.  TTE: Severely reduced left ventricular systolic function. Grade III left ventricular diastolic dysfunction. Reduced right ventricular systolic function. Severely dilated left atrium. Mild-to-moderate mitral regurgitation. Mild-to-moderate tricuspid regurgitation. Pulmonary hypertension present, pulmonary artery systolic pressure is 38 mmHg. Trivial pericardial effusion without echocardiographic evidence of   cardiac tamponade physiology.  - holding Lasix gtt I/S/O hypotension  - holding home GDMT        - holding Entresto, restart when BP stable        - holding Toprol, restart Lopressor 12.5mg when euvolemic        - holding torsemide, was on lasix gtt   - strict I&Os 41M PMH CAD, MI (Jan 2022, stent), Left heart thrombus (Xarelto) CHF (unknown EF), colon cancer (PAWAN, s/p resection/chemo 2016), HIV (Biktarvy), former smoker (<1 pack year) BIBEMS for worsening shortness of breath for two days with nonproductive cough, admitted to CCU for acute hypoxic respiratory failure 2/2 PNA vs acute heart failure exacerbation.    #Acute Hypoxic Respiratory Failure likely 2/2 CHF exacerbation vs PNA  SOB progressing for 2 days after cold-like illness found to be in AHRF requiring BIPAP.   Febrile on admission. S/p CTX in ED and vanc x1 dose. S/p CTX, decadron 10mg. BIPAP in ED with improvement in SpO2.   Elevated LDH, d-dimer, LDH, procal, ESR, CRP.   COVID, RVP, MRSA nares, Ur legionella/strep neg.    CXR shows bilateral hilar congestion and diffuse bilateral opacification   Differentials include ARDS, flash pulmonary edema, CHF exacerbation, COVID, Pneumonia. COVID negative in ED however ARDS with decreased lymphocyte%.   Less suspicion for PCP as patient states compliant on Biktarvy. CD4 178.  - c/w HFNC, wean as tolerated  - c/w monitor UOP and CXR  - c/w Zosyn 3.375g q6h  - c/w Bactrim IVP 500mg q8h  - obtain induced sputum Cx if produces sputum  - order sputum AFB smear/cuture, serum CryptoAg, urinary histoplasmosis antigen  - will consider bronchoscopy after evaluating HRCT (still pending)  - f/u fungitell    #Acute Decompensated Heart Failure  Known CHF. Baseline EF on 2/2022 was 15-20% with regional wall abnormality. No valvular disease.  Primary cardiologist is at [facility] (Dr. Saniya Ramirez and Dr. Marlo Kapoor)  Home meds: Entresto, torsemide, Toprol XL  Diffuse pulmonary edema on XR, volume overloaded on exam with edema, S3. S/p lasix 80mg and NG drip in ED.  EKG 4/4: Sinus tach. Nonspecific st changes. LAFB. Incomplete RBBB  Troponin I 170, ProBNP 34,267. I&O apparently compensated during this admission.   Dilated left ventriclular size.  TTE: Severely reduced left ventricular systolic function. Grade III left ventricular diastolic dysfunction. Reduced right ventricular systolic function. Severely dilated left atrium. Mild-to-moderate mitral regurgitation. Mild-to-moderate tricuspid regurgitation. Pulmonary hypertension present, pulmonary artery systolic pressure is 38 mmHg. Trivial pericardial effusion without echocardiographic evidence of cardiac tamponade physiology.  - holding Lasix gtt I/S/O hypotension  - holding home GDMT        - holding Entresto, restart when BP stable        - restarted Lopressor 12.5mg when euvolemic        - holding torsemide  - strict I&Os 41M PMH CAD, MI (Jan 2022, stent), Left heart thrombus (Xarelto) CHF (unknown EF), colon cancer (PAWAN, s/p resection/chemo 2016), HIV (Biktarvy), former smoker (<1 pack year) BIBEMS for worsening shortness of breath for two days with nonproductive cough, admitted to CCU for acute hypoxic respiratory failure 2/2 PNA vs acute heart failure exacerbation.    #Acute Hypoxic Respiratory Failure likely 2/2 CHF exacerbation vs PNA  SOB progressing for 2 days after cold-like illness found to be in AHRF requiring BIPAP.   Febrile on admission. S/p CTX in ED and vanc x1 dose. S/p CTX, decadron 10mg. BIPAP in ED with improvement in SpO2.   Elevated LDH, d-dimer, LDH, procal, ESR, CRP.   COVID, RVP, MRSA nares, Ur legionella/strep neg.    CXR shows bilateral hilar congestion and diffuse bilateral opacification   Differentials include ARDS, flash pulmonary edema, CHF exacerbation, COVID, Pneumonia. COVID negative in ED however ARDS with decreased lymphocyte%.   Less suspicion for PCP as patient states compliant on Biktarvy. CD4 178.  - c/w HFNC, wean as tolerated  - c/w monitor UOP and CXR  - c/w Zosyn 3.375g q6h  - c/w Bactrim IVP 500mg q8h  - obtain induced sputum Cx if produces sputum  - order sputum AFB smear/cuture and urinary histoplasmosis antigen  - will consider bronchoscopy after evaluating HRCT (still pending)  - f/u fungitell, serum CryptoAg, PCP PCR    #Acute Decompensated Heart Failure  Known CHF. Baseline EF on 2/2022 was 15-20% with regional wall abnormality. No valvular disease.  Primary cardiologist is at [facility] (Dr. Saniya Ramirez and Dr. Marlo Kapoor)  Home meds: Entresto, torsemide, Toprol XL  Diffuse pulmonary edema on XR, volume overloaded on exam with edema, S3. S/p lasix 80mg and NG drip in ED.  EKG 4/4: Sinus tach. Nonspecific st changes. LAFB. Incomplete RBBB  Troponin I 170, ProBNP 34,267. I&O apparently compensated during this admission.   Dilated left ventriclular size.  TTE: Severely reduced left ventricular systolic function. Grade III left ventricular diastolic dysfunction. Reduced right ventricular systolic function. Severely dilated left atrium. Mild-to-moderate mitral regurgitation. Mild-to-moderate tricuspid regurgitation. Pulmonary hypertension present, pulmonary artery systolic pressure is 38 mmHg. Trivial pericardial effusion without echocardiographic evidence of cardiac tamponade physiology.  - holding Lasix gtt I/S/O hypotension  - holding home GDMT        - holding Entresto, restart when BP stable        - restarted Lopressor 12.5mg when euvolemic        - holding torsemide  - strict I&Os 41M PMH CAD, MI (Jan 2022, stent), Left heart thrombus (Xarelto) CHF (unknown EF), colon cancer (PAWAN, s/p resection/chemo 2016), HIV (Biktarvy), former smoker (<1 pack year) BIBEMS for worsening shortness of breath for two days with nonproductive cough, admitted to CCU for acute hypoxic respiratory failure 2/2 PNA vs acute heart failure exacerbation.    #Acute Hypoxic Respiratory Failure likely 2/2 CHF exacerbation vs PNA  SOB progressing for 2 days after cold-like illness found to be in AHRF requiring BIPAP.   Febrile on admission. S/p CTX in ED and vanc x1 dose. S/p CTX, decadron 10mg. BIPAP in ED with improvement in SpO2.   Elevated LDH, d-dimer, LDH, procal, ESR, CRP.   COVID, RVP, MRSA nares, Ur legionella/strep neg.    CXR shows bilateral hilar congestion and diffuse bilateral opacification   Differentials include ARDS, flash pulmonary edema, CHF exacerbation, COVID, Pneumonia. COVID negative in ED however ARDS with decreased lymphocyte%.   Less suspicion for PCP as patient states compliant on Biktarvy. CD4 178.  - c/w HFNC, wean as tolerated  - c/w Zosyn 3.375g q6h  - c/w Bactrim IVP 500mg q8h  - obtain induced sputum Cx if produces sputum  - order sputum AFB smear/cuture and urinary histoplasmosis antigen  - will consider bronchoscopy after evaluating HRCT (still pending)  - f/u fungitell, serum CryptoAg, PCP PCR  - c/w acute decompensated heart failure management  - c/w monitor UOP and CXR    #Acute Decompensated Heart Failure  Known CHF. Baseline EF on 2/2022 was 15-20% with regional wall abnormality. No valvular disease.  Primary cardiologist is at [facility] (Dr. Saniya Ramirez and Dr. Marlo Kapoor)  Home meds: Entresto, torsemide, Toprol XL  Diffuse pulmonary edema on XR, volume overloaded on exam with edema, S3. S/p lasix 80mg and NG drip in ED.  EKG 4/4: Sinus tach. Nonspecific st changes. LAFB. Incomplete RBBB  Troponin I 170, ProBNP 34,267. I&O apparently compensated during this admission.   Dilated left ventriclular size.  TTE: Severely reduced left ventricular systolic function. Grade III left ventricular diastolic dysfunction. Reduced right ventricular systolic function. Severely dilated left atrium. Mild-to-moderate mitral regurgitation. Mild-to-moderate tricuspid regurgitation. Pulmonary hypertension present, pulmonary artery systolic pressure is 38 mmHg. Trivial pericardial effusion without echocardiographic evidence of cardiac tamponade physiology.  - holding Lasix gtt I/S/O hypotension  - holding home GDMT        - holding Entresto, restart when BP stable        - restarted Lopressor 12.5mg when euvolemic        - holding torsemide  - strict I&Os 41M PMH CAD, MI (Jan 2022, stent), Left heart thrombus (Xarelto) CHF (unknown EF), colon cancer (PAWAN, s/p resection/chemo 2016), HIV (Biktarvy), former smoker (<1 pack year) BIBEMS for worsening shortness of breath for two days with nonproductive cough and chest discomfort, admitted to CCU for acute hypoxic respiratory failure likely 2/2 CHF exacerbation vs flash pulmonary edema vs PNA (bacterial, fungal...) vs ARDS in an immunocompromised patient with HIV    #Acute Hypoxic Respiratory Failure   Likely 2/2 acute decompensated heart failure vs pneumonia (bacterial, fungal. PCP,...) vs ARDS in an immunocompromised patient with HIV  SOB progressing for 2 days after cold-like illness found to be in AHRF requiring BIPAP. Known CHF. Baseline EF on 2/2022 was 15-20% with regional wall abnormality. No valvular disease. Orthopnea Diffuse pulmonary edema on XR, volume overloaded on exam with edema, S3. S/p lasix 80mg and NG drip in ED. ProBNP 34,267. I&O with a net positive 2000cc.   - TTE: Severely reduced left ventricular systolic function. Grade III left ventricular diastolic dysfunction. Reduced right ventricular systolic function. Severely dilated left atrium. Mild-to-moderate mitral regurgitation. Mild-to-moderate tricuspid regurgitation. Pulmonary hypertension present, pulmonary artery systolic pressure is 38 mmHg. Trivial pericardial effusion without echocardiographic evidence of cardiac tamponade physiology.   Febrile on admission. S/p CTX in ED and vanc x1 dose. S/p CTX, decadron 10mg. BIPAP in ED with improvement in SpO2.   Elevated LDH, d-dimer, LDH, procal, ESR, CRP. WBC persistently elevated.   COVID, RVP, MRSA nares swab, Ur legionella/strep neg.    CxR shows bilateral hilar congestion and diffuse bilateral opacification with significant improvement during the admission.  Patient states compliant on Biktarvy and CD4 abs count is 178.  PLAN:  - c/w HFNC, wean as tolerated  - c/w Zosyn 3.375g q6h (as per ID recs)  - c/w Bactrim IVP 500mg q8h (as per ID recs)  - obtain induced sputum Cx if produces sputum  - order sputum AFB smear/cuture, galactomannan test, urinary histoplasmosis antigen  - f/u Fungitell, serum CryptoAg, PCP PCR  - c/w acute decompensated heart failure management  - c/w monitor UOP and CxR  - will evaluate HRCT (still pending) This is a 41M with PMH CAD, MI (Jan 2022 s/p GARFIELD), left heart thrombus (on Xarelto), HFrEF, colon cancer (s/p resection 2016, and chemotherapy, no complications), HIV who presents for 2 days of shortness of breath. Patient admitted for hypoxemic respiratory failure in the setting of cardiogenic pulmonary edema requiring lasix gtt. Patient with underlying interstitial lung process and given fevers and low CD4, concern for PCP pneumonia, for which pulmonology has been consulted.     #Acute hypoxemic respiratory failure   #Cardiogenic pulmonary edema  #Reticulonodular opacities on CXR  #R/o PCP    Patient presenting as above with clear component of cardiogenic pulmonary edema requiring BIPAP and HFNC. He has had significant improvement in CXR as well as clinically and planned to de-escalate to NC today. However, CXR remains abnormal with reticulonodular opacities concerning for primary intrapulmonary process, which can be consistent with opportunistic infections such as PCP or other fungal infections. Nonetheless, the most common cause of pneumonia in immunocompromised patients remains bacterial with Strep pneumo or MSSA.  which is sensitive for PCP pneumonia but not specific.     Recommend:  - Wean 40/40 HFNC to 6L NC if possible, Spo2 goal 92% and above  - Agree with broad coverage antibiotics  - Agree with empiric coverage with bactrim, ID following  - Continue 21 day steroid taper for severe PCP  - Please send sputum culture as well as sputum for PCP PCR  - Check serum galactomannan, histo Ag, crypto Ag, and Fungitell  - Management of heart failure per primary CCU team  - Reasonable to obtain CT chest to evaluate lung parenchyma    Patient discussed with attending Dr. Dorsey. Recommendations finalized when attending attests. This is a 41M with PMH CAD, MI (Jan 2022 s/p GARFIELD), left heart thrombus (on Xarelto), HFrEF, colon cancer (s/p resection 2016, and chemotherapy, no complications), HIV who presents for 2 days of shortness of breath. Patient admitted for hypoxemic respiratory failure in the setting of cardiogenic pulmonary edema requiring lasix gtt. Patient with underlying interstitial lung process and given fevers and low CD4, concern for PCP pneumonia, for which pulmonology has been consulted.     #Acute hypoxemic respiratory failure   #Cardiogenic pulmonary edema  #Reticulonodular opacities on CXR  #R/o PCP    Patient presenting as above with clear component of cardiogenic pulmonary edema requiring BIPAP and HFNC. He has had significant improvement in CXR as well as clinically and planned to de-escalate to NC today. POCUS still with diffuse B lines bilaterally, and L small consolidation of lung. No pleural effusions. However, CXR remains abnormal with reticulonodular opacities concerning for primary intrapulmonary process, which can be consistent with opportunistic infections such as PCP or other fungal infections. Nonetheless, the most common cause of pneumonia in immunocompromised patients remains bacterial with Strep pneumo or MSSA.  which is sensitive for PCP pneumonia but not specific.     Recommend:  - Wean 40/40 HFNC to 6L NC if possible, Spo2 goal 92% and above  - Agree with broad coverage antibiotics  - Agree with empiric coverage with bactrim, ID following  - Continue 21 day steroid taper for severe PCP  - Please send sputum culture as well as sputum for PCP PCR  - Check serum galactomannan, histo Ag, crypto Ag, and Fungitell  - Management of heart failure per primary CCU team  - Reasonable to obtain CT chest to evaluate lung parenchyma    Patient discussed with attending Dr. Dorsey. Recommendations finalized when attending attests. This is a 41M with PMH CAD, MI (Jan 2022 s/p GARFIELD), left heart thrombus (on Xarelto), HFrEF, colon cancer (s/p resection 2016, and chemotherapy, no complications), HIV who presents for 2 days of shortness of breath. Patient admitted for hypoxemic respiratory failure in the setting of cardiogenic pulmonary edema requiring lasix gtt. Patient with underlying interstitial lung process and given fevers and low CD4, concern for PCP pneumonia, for which pulmonology has been consulted.     #Acute hypoxemic respiratory failure   #Cardiogenic pulmonary edema  #Reticulonodular opacities on CXR  #R/o PCP    Patient presenting as above with clear component of cardiogenic pulmonary edema requiring BIPAP and HFNC. He has had significant improvement in CXR as well as clinically and planned to de-escalate to NC today. POCUS still with diffuse B lines bilaterally, and L small consolidation of lung. No pleural effusions. However, CXR remains abnormal with reticulonodular opacities concerning for primary intrapulmonary process, which can be consistent with opportunistic infections such as PCP or other fungal infections. Nonetheless, the most common cause of pneumonia in immunocompromised patients remains bacterial with Strep pneumo or MSSA.  which is sensitive for PCP pneumonia but not specific.     Recommend:  - Wean 40/40 HFNC to 6L NC if possible, Spo2 goal 92% and above  - Agree with broad coverage antibiotics  - Agree with empiric coverage with bactrim, ID following  - Continue 21 day steroid taper for severe PCP  - Please send sputum culture as well as sputum for PCP PCR  - Check serum galactomannan, histo Ag, crypto Ag, and Fungitell  - Management of heart failure per primary CCU team  - Reasonable to obtain CT chest to evaluate lung parenchyma  - Will plan for bronchoscopy on 4/7- patient needs cardiology clearance prior to undergoing bronchoscopy     Patient discussed with attending Dr. Dorsey. Recommendations finalized when attending attests.

## 2022-04-05 NOTE — CONSULT NOTE ADULT - ASSESSMENT
41 M w/ PMH of CAD, MI (Jan 2022, s/p stent), left heart thrombus (on Xarelto, now resolved) CHF (EF 15-20%), colon cancer (s/p resection 2016 w/ colostomy), HIV (on Biktarvy), former smoker (1pack/week for 3 years) presenting with 2 days of worsening SOB, chest tightness, and non-productive cough. Pt was admitted for acute hypoxic respiratory failure requiring lasix diuresis. He was found to have CXR significant for severe diffuse b/l dense infiltrates in setting of CD4 178 and VL 50k. ID consulted for concern for PCP pneumonia. Pt clinically improved following diuresis which increases suspicion for HF exacerbation with possible superimposed bacterial vs. PCP pneumonia.     SUMMARY:  - COVID neg, RVP neg  - Hepatitis panel neg  - HIV (CD4 178, VL 50,732)  - MRSA/MSSA neg  - Legionella urine antigen neg  - Cryptococcal serum Ag neg  - UA c/w asymptomatic bacteriuria, UCx growing E.coli    RECOMMENDATIONS:  - F/u BCX 04/04  - F/u fungitell  - F/u induced sputum with PCP PCR  - Send for Histoplasma Ag  - Obtain CT chest  - Please obtain records from [facility] at [address] [place] [address] for collateral on HIV treatment history    Medical Student Note  ID Team 1 will continue to follow  Note not finalized until attested by attending   41 M w/ PMH of CAD, MI (Jan 2022, s/p stent), left heart thrombus (on Xarelto, now resolved) CHF (EF 15-20%), colon cancer (s/p resection 2016 w/ colostomy), HIV (on Biktarvy), former smoker (1pack/week for 3 years) presenting with 2 days of worsening SOB, chest tightness, and non-productive cough. Pt was admitted for acute hypoxic respiratory failure requiring lasix diuresis. He was found to have CXR significant for severe diffuse b/l dense infiltrates in setting of CD4 178 and VL 50k. ID consulted for concern for PCP pneumonia. Pt clinically improved following diuresis which increases suspicion for HF exacerbation with possible superimposed bacterial vs. PCP pneumonia.     SUMMARY:  - COVID neg, RVP neg  - Hepatitis panel neg  - HIV (CD4 178, VL 50,732)  - MRSA/MSSA neg  - Legionella urine antigen neg  - Cryptococcal serum Ag neg  - UA c/w asymptomatic bacteriuria, UCx growing E.coli    RECOMMENDATIONS:  - Continue with zosyn 3.375g IV q6h  - Continue with bactrim 500 mg IV q8h  - F/u BCX 04/04  - F/u fungitell  - F/u induced sputum with PCP PCR  - Send for Histoplasma Ag  - Obtain CT chest  - Please obtain records from [facility] at [address] [place] [address] for collateral on HIV treatment history    Medical Student Note  ID Team 1 will continue to follow  Note not finalized until attested by attending   41 M w/ PMH of CAD, MI (Jan 2022, s/p stent), left heart thrombus (on Xarelto, now resolved) CHF (EF 15-20%), colon cancer (s/p resection 2016 w/ colostomy), HIV (on Biktarvy), former smoker (1pack/week for 3 years) presenting with 2 days of worsening SOB, chest tightness, and non-productive cough. Pt was admitted for acute hypoxic respiratory failure requiring lasix diuresis. He was found to have CXR significant for severe diffuse b/l dense infiltrates in setting of CD4 178 and VL 50k. ID consulted for concern for PCP pneumonia. Pt clinically improved following diuresis which increases suspicion for HF exacerbation with possible superimposed bacterial vs. PCP pneumonia.     SUMMARY:  - COVID neg, RVP neg  - Hepatitis panel neg  - HIV (CD4 178, VL 50,732)  - MRSA/MSSA neg  - Legionella urine antigen neg  - Cryptococcal serum Ag neg  - UA c/w asymptomatic bacteriuria, UCx growing E.coli    RECOMMENDATIONS:  - Continue with zosyn 3.375g IV q6h  - Continue with bactrim 500 mg IV q8h  - Continue 21 day steroid taper for severe PCP  - F/u BCX 04/04  - F/u fungitell  - F/u induced sputum with culture + PCP PCR  - Send for Histoplasma Ag  - Obtain CT chest  - Please obtain records from [facility] at [address] [place] [address] for collateral on HIV treatment history    Medical Student Note  ID Team 1 will continue to follow  Note not finalized until attested by attending   41 M w/ PMH of CAD, MI (Jan 2022, s/p stent), left heart thrombus (on Xarelto, now resolved) CHF (EF 15-20%), colon cancer (s/p resection 2016 w/ colostomy), HIV (on Biktarvy), former smoker (1pack/week for 3 years) presenting with 2 days of worsening SOB, chest tightness, and non-productive cough. Pt was admitted for acute hypoxic respiratory failure requiring lasix diuresis. He was found to have CXR significant for severe diffuse b/l dense infiltrates in setting of CD4 178 and HIV VL 50k. ID consulted for concern for PCP pneumonia. Pt clinically improved following diuresis which increases suspicion for HF exacerbation with possible superimposed bacterial vs. PCP pneumonia.     SUMMARY:  - COVID neg, RVP neg  - Hepatitis panel neg  - HIV (CD4 178, VL 50,732)  - MRSA/MSSA neg  - Legionella urine antigen neg  - Cryptococcal serum Ag neg  - UA c/w asymptomatic bacteriuria, UCx growing E.coli    RECOMMENDATIONS:  - Continue with zosyn 3.375g IV q6h  - Continue with bactrim 500 mg IV q8h  - Continue 21 day steroid taper for severe PCP  - F/u BCX 04/04  - F/u fungitell  - F/u induced sputum with culture + PCP PCR  - Send for Histoplasma Ag  - Obtain CT chest  - Please obtain records from [facility] at [address] [place] [address] for collateral on HIV treatment history    Medical Student Note  ID Team 1 will continue to follow  Note not finalized until attested by attending

## 2022-04-05 NOTE — PROVIDER CONTACT NOTE (OTHER) - ACTION/TREATMENT ORDERED:
Spoke with MD Dontrell and MD Estela. MD still wants pt to receive metoprolol with SBP 87. Stated as long as MAP >60, ok to give medication. Will administer and monitor pt closely.

## 2022-04-06 DIAGNOSIS — R91.8 OTHER NONSPECIFIC ABNORMAL FINDING OF LUNG FIELD: ICD-10-CM

## 2022-04-06 LAB
-  AMIKACIN: SIGNIFICANT CHANGE UP
-  AMOXICILLIN/CLAVULANIC ACID: SIGNIFICANT CHANGE UP
-  AMPICILLIN/SULBACTAM: SIGNIFICANT CHANGE UP
-  AMPICILLIN: SIGNIFICANT CHANGE UP
-  AZTREONAM: SIGNIFICANT CHANGE UP
-  CEFAZOLIN: SIGNIFICANT CHANGE UP
-  CEFEPIME: SIGNIFICANT CHANGE UP
-  CEFOXITIN: SIGNIFICANT CHANGE UP
-  CEFTRIAXONE: SIGNIFICANT CHANGE UP
-  CIPROFLOXACIN: SIGNIFICANT CHANGE UP
-  ERTAPENEM: SIGNIFICANT CHANGE UP
-  GENTAMICIN: SIGNIFICANT CHANGE UP
-  IMIPENEM: SIGNIFICANT CHANGE UP
-  LEVOFLOXACIN: SIGNIFICANT CHANGE UP
-  MEROPENEM: SIGNIFICANT CHANGE UP
-  NITROFURANTOIN: SIGNIFICANT CHANGE UP
-  PIPERACILLIN/TAZOBACTAM: SIGNIFICANT CHANGE UP
-  TIGECYCLINE: SIGNIFICANT CHANGE UP
-  TOBRAMYCIN: SIGNIFICANT CHANGE UP
-  TRIMETHOPRIM/SULFAMETHOXAZOLE: SIGNIFICANT CHANGE UP
ALBUMIN SERPL ELPH-MCNC: 2.4 G/DL — LOW (ref 3.3–5)
ALP SERPL-CCNC: 56 U/L — SIGNIFICANT CHANGE UP (ref 40–120)
ALT FLD-CCNC: 96 U/L — HIGH (ref 10–45)
ANION GAP SERPL CALC-SCNC: 9 MMOL/L — SIGNIFICANT CHANGE UP (ref 5–17)
AST SERPL-CCNC: 74 U/L — HIGH (ref 10–40)
BASOPHILS # BLD AUTO: 0.01 K/UL — SIGNIFICANT CHANGE UP (ref 0–0.2)
BASOPHILS NFR BLD AUTO: 0.1 % — SIGNIFICANT CHANGE UP (ref 0–2)
BILIRUB SERPL-MCNC: 0.3 MG/DL — SIGNIFICANT CHANGE UP (ref 0.2–1.2)
BLD GP AB SCN SERPL QL: NEGATIVE — SIGNIFICANT CHANGE UP
BUN SERPL-MCNC: 29 MG/DL — HIGH (ref 7–23)
C TRACH RRNA SPEC QL NAA+PROBE: SIGNIFICANT CHANGE UP
CALCIUM SERPL-MCNC: 8 MG/DL — LOW (ref 8.4–10.5)
CHLORIDE SERPL-SCNC: 98 MMOL/L — SIGNIFICANT CHANGE UP (ref 96–108)
CO2 SERPL-SCNC: 26 MMOL/L — SIGNIFICANT CHANGE UP (ref 22–31)
CREAT SERPL-MCNC: 1.66 MG/DL — HIGH (ref 0.5–1.3)
CULTURE RESULTS: SIGNIFICANT CHANGE UP
EGFR: 52 ML/MIN/1.73M2 — LOW
EOSINOPHIL # BLD AUTO: 0 K/UL — SIGNIFICANT CHANGE UP (ref 0–0.5)
EOSINOPHIL NFR BLD AUTO: 0 % — SIGNIFICANT CHANGE UP (ref 0–6)
GAMMA INTERFERON BACKGROUND BLD IA-ACNC: 0 IU/ML — SIGNIFICANT CHANGE UP
GLUCOSE BLDC GLUCOMTR-MCNC: 110 MG/DL — HIGH (ref 70–99)
GLUCOSE BLDC GLUCOMTR-MCNC: 151 MG/DL — HIGH (ref 70–99)
GLUCOSE BLDC GLUCOMTR-MCNC: 183 MG/DL — HIGH (ref 70–99)
GLUCOSE SERPL-MCNC: 146 MG/DL — HIGH (ref 70–99)
HBV CORE AB SER-ACNC: REACTIVE
HBV SURFACE AG SER-ACNC: REACTIVE
HBV SURFACE AG SER-ACNC: REACTIVE
HCT VFR BLD CALC: 28 % — LOW (ref 39–50)
HGB BLD-MCNC: 10 G/DL — LOW (ref 13–17)
IMM GRANULOCYTES NFR BLD AUTO: 0.7 % — SIGNIFICANT CHANGE UP (ref 0–1.5)
LYMPHOCYTES # BLD AUTO: 0.71 K/UL — LOW (ref 1–3.3)
LYMPHOCYTES # BLD AUTO: 5.9 % — LOW (ref 13–44)
M TB IFN-G BLD-IMP: NEGATIVE — SIGNIFICANT CHANGE UP
M TB IFN-G CD4+ BCKGRND COR BLD-ACNC: 0 IU/ML — SIGNIFICANT CHANGE UP
M TB IFN-G CD4+CD8+ BCKGRND COR BLD-ACNC: 0 IU/ML — SIGNIFICANT CHANGE UP
MAGNESIUM SERPL-MCNC: 2.3 MG/DL — SIGNIFICANT CHANGE UP (ref 1.6–2.6)
MCHC RBC-ENTMCNC: 28.3 PG — SIGNIFICANT CHANGE UP (ref 27–34)
MCHC RBC-ENTMCNC: 35.7 GM/DL — SIGNIFICANT CHANGE UP (ref 32–36)
MCV RBC AUTO: 79.3 FL — LOW (ref 80–100)
METHOD TYPE: SIGNIFICANT CHANGE UP
MONOCYTES # BLD AUTO: 0.45 K/UL — SIGNIFICANT CHANGE UP (ref 0–0.9)
MONOCYTES NFR BLD AUTO: 3.7 % — SIGNIFICANT CHANGE UP (ref 2–14)
N GONORRHOEA RRNA SPEC QL NAA+PROBE: SIGNIFICANT CHANGE UP
NEUTROPHILS # BLD AUTO: 10.8 K/UL — HIGH (ref 1.8–7.4)
NEUTROPHILS NFR BLD AUTO: 89.6 % — HIGH (ref 43–77)
NRBC # BLD: 0 /100 WBCS — SIGNIFICANT CHANGE UP (ref 0–0)
ORGANISM # SPEC MICROSCOPIC CNT: SIGNIFICANT CHANGE UP
ORGANISM # SPEC MICROSCOPIC CNT: SIGNIFICANT CHANGE UP
PHOSPHATE SERPL-MCNC: 2.8 MG/DL — SIGNIFICANT CHANGE UP (ref 2.5–4.5)
PLATELET # BLD AUTO: 290 K/UL — SIGNIFICANT CHANGE UP (ref 150–400)
POTASSIUM SERPL-MCNC: 3.9 MMOL/L — SIGNIFICANT CHANGE UP (ref 3.5–5.3)
POTASSIUM SERPL-SCNC: 3.9 MMOL/L — SIGNIFICANT CHANGE UP (ref 3.5–5.3)
PROT SERPL-MCNC: 5.8 G/DL — LOW (ref 6–8.3)
QUANT TB PLUS MITOGEN MINUS NIL: 0.37 IU/ML — SIGNIFICANT CHANGE UP
RBC # BLD: 3.53 M/UL — LOW (ref 4.2–5.8)
RBC # FLD: 15.7 % — HIGH (ref 10.3–14.5)
RH IG SCN BLD-IMP: POSITIVE — SIGNIFICANT CHANGE UP
SODIUM SERPL-SCNC: 133 MMOL/L — LOW (ref 135–145)
SPECIMEN SOURCE: SIGNIFICANT CHANGE UP
WBC # BLD: 12.06 K/UL — HIGH (ref 3.8–10.5)
WBC # FLD AUTO: 12.06 K/UL — HIGH (ref 3.8–10.5)

## 2022-04-06 PROCEDURE — 99291 CRITICAL CARE FIRST HOUR: CPT

## 2022-04-06 PROCEDURE — 99232 SBSQ HOSP IP/OBS MODERATE 35: CPT | Mod: GC

## 2022-04-06 PROCEDURE — 71045 X-RAY EXAM CHEST 1 VIEW: CPT | Mod: 26

## 2022-04-06 PROCEDURE — 99232 SBSQ HOSP IP/OBS MODERATE 35: CPT

## 2022-04-06 PROCEDURE — 93451 RIGHT HEART CATH: CPT | Mod: 26

## 2022-04-06 RX ADMIN — PANTOPRAZOLE SODIUM 40 MILLIGRAM(S): 20 TABLET, DELAYED RELEASE ORAL at 05:21

## 2022-04-06 RX ADMIN — PIPERACILLIN AND TAZOBACTAM 3.33 GRAM(S): 4; .5 INJECTION, POWDER, LYOPHILIZED, FOR SOLUTION INTRAVENOUS at 21:12

## 2022-04-06 RX ADMIN — Medication 40 MILLIGRAM(S): at 18:03

## 2022-04-06 RX ADMIN — Medication 2: at 21:11

## 2022-04-06 RX ADMIN — BICTEGRAVIR SODIUM, EMTRICITABINE, AND TENOFOVIR ALAFENAMIDE FUMARATE 1 TABLET(S): 30; 120; 15 TABLET ORAL at 11:32

## 2022-04-06 RX ADMIN — CHLORHEXIDINE GLUCONATE 1 APPLICATION(S): 213 SOLUTION TOPICAL at 05:50

## 2022-04-06 RX ADMIN — PIPERACILLIN AND TAZOBACTAM 3.33 GRAM(S): 4; .5 INJECTION, POWDER, LYOPHILIZED, FOR SOLUTION INTRAVENOUS at 03:30

## 2022-04-06 RX ADMIN — CLOPIDOGREL BISULFATE 75 MILLIGRAM(S): 75 TABLET, FILM COATED ORAL at 11:32

## 2022-04-06 RX ADMIN — Medication 40 MILLIGRAM(S): at 05:19

## 2022-04-06 RX ADMIN — Medication 2: at 11:33

## 2022-04-06 RX ADMIN — Medication 265.63 MILLIGRAM(S): at 10:08

## 2022-04-06 RX ADMIN — Medication 81 MILLIGRAM(S): at 11:33

## 2022-04-06 RX ADMIN — PIPERACILLIN AND TAZOBACTAM 3.33 GRAM(S): 4; .5 INJECTION, POWDER, LYOPHILIZED, FOR SOLUTION INTRAVENOUS at 15:13

## 2022-04-06 RX ADMIN — Medication 12.5 MILLIGRAM(S): at 05:22

## 2022-04-06 RX ADMIN — Medication 12.5 MILLIGRAM(S): at 18:03

## 2022-04-06 RX ADMIN — Medication 265.63 MILLIGRAM(S): at 19:05

## 2022-04-06 RX ADMIN — Medication 265.63 MILLIGRAM(S): at 02:06

## 2022-04-06 RX ADMIN — ATORVASTATIN CALCIUM 80 MILLIGRAM(S): 80 TABLET, FILM COATED ORAL at 21:12

## 2022-04-06 RX ADMIN — PIPERACILLIN AND TAZOBACTAM 3.33 GRAM(S): 4; .5 INJECTION, POWDER, LYOPHILIZED, FOR SOLUTION INTRAVENOUS at 09:30

## 2022-04-06 NOTE — PROGRESS NOTE ADULT - SUBJECTIVE AND OBJECTIVE BOX
PULMONARY CONSULT SERVICE FOLLOW-UP NOTE    INTERVAL HPI:  Reviewed chart and overnight events; patient seen and examined at bedside. Feels well on 6L NC.    MEDICATIONS:  Antimicrobials:  bictegravir 50 mG/emtricitabine 200 mG/tenofovir alafenamide 25 mG (BIKTARVY) 1 Tablet(s) Oral daily  piperacillin/tazobactam IVPB.. 3.375 Gram(s) IV Intermittent every 6 hours  trimethoprim / sulfamethoxazole IVPB 500 milliGRAM(s) IV Intermittent every 8 hours    Anticoagulants:  aspirin  chewable 81 milliGRAM(s) Oral daily  clopidogrel Tablet 75 milliGRAM(s) Oral daily    Cardiac:  metoprolol tartrate 12.5 milliGRAM(s) Oral every 12 hours    Allergies  No Known Allergies    Vital Signs Last 24 Hrs  T(C): 36.2 (06 Apr 2022 06:27), Max: 36.8 (05 Apr 2022 13:00)  T(F): 97.2 (06 Apr 2022 06:27), Max: 98.3 (05 Apr 2022 13:00)  HR: 68 (06 Apr 2022 06:09) (63 - 94)  BP: 86/57 (06 Apr 2022 06:00) (76/56 - 97/67)  BP(mean): 68 (06 Apr 2022 06:00) (59 - 78)  RR: 20 (06 Apr 2022 06:09) (15 - 28)  SpO2: 95% (06 Apr 2022 06:09) (92% - 98%)    04-05 @ 07:01  -  04-06 @ 07:00  --------------------------------------------------------  IN: 1977 mL / OUT: 1870 mL / NET: 107 mL    PHYSICAL EXAM:  Constitutional: WD, comfortable on HFNC  Head: NC/AT  EENT: anicteric sclera; oropharynx clear, MMM  Neck: supple, no appreciable JVD  Respiratory: bilateral crackles worse at bases; no W/R  Cardiovascular: +S1/S2, regular tachycardia  Gastrointestinal: soft, NT/ND  Extremities: WWP; trace pitting edema, clubbing or cyanosis  Vascular: 2+ radial and pedal pulses  Neurological: alert, oriented    LABS:  ABG - ( 05 Apr 2022 15:17 )  pH, Arterial: 7.47  pH, Blood: x     /  pCO2: 38    /  pO2: 102   / HCO3: 28    / Base Excess: 3.9   /  SaO2: 97.0      CBC Full  -  ( 06 Apr 2022 05:31 )  WBC Count : 12.06 K/uL  RBC Count : 3.53 M/uL  Hemoglobin : 10.0 g/dL  Hematocrit : 28.0 %  Platelet Count - Automated : 290 K/uL  Mean Cell Volume : 79.3 fl  Mean Cell Hemoglobin : 28.3 pg  Mean Cell Hemoglobin Concentration : 35.7 gm/dL  Auto Neutrophil # : 10.80 K/uL  Auto Lymphocyte # : 0.71 K/uL  Auto Monocyte # : 0.45 K/uL  Auto Eosinophil # : 0.00 K/uL  Auto Basophil # : 0.01 K/uL  Auto Neutrophil % : 89.6 %  Auto Lymphocyte % : 5.9 %  Auto Monocyte % : 3.7 %  Auto Eosinophil % : 0.0 %  Auto Basophil % : 0.1 %    04-06    133<L>  |  98  |  29<H>  ----------------------------<  146<H>  3.9   |  26  |  1.66<H>    Ca    8.0<L>      06 Apr 2022 05:31  Phos  2.8     04-06  Mg     2.3     04-06    TPro  5.8<L>  /  Alb  2.4<L>  /  TBili  0.3  /  DBili  x   /  AST  74<H>  /  ALT  96<H>  /  AlkPhos  56  04-06    PTT - ( 05 Apr 2022 05:49 )  PTT:53.9 sec    RADIOLOGY & ADDITIONAL STUDIES: Reviewed.

## 2022-04-06 NOTE — PROGRESS NOTE ADULT - ASSESSMENT
41 M w/ PMH of CAD, MI (Jan 2022, s/p stent), left heart thrombus (on Xarelto, now resolved) CHF (EF 15-20%), colon cancer (s/p resection 2016 w/ colostomy), HIV (on Biktarvy), former smoker (1pack/week for 3 years) presenting with 2 days of worsening SOB, chest tightness, and non-productive cough. Pt was admitted for acute hypoxic respiratory failure requiring lasix diuresis. He was found to have CXR significant for severe diffuse b/l dense infiltrates in setting of CD4 178 and HIV VL 50k. ID consulted for concern for PCP pneumonia. Pt clinically improved following diuresis which increases suspicion for HF exacerbation with possible superimposed bacterial vs. PCP pneumonia.     SUMMARY:  - COVID neg, RVP neg  - HBC neg, HAV neg, HBV c/w chronic HBV infection  - HIV (CD4 178, VL 50,732)  - MRSA/MSSA neg  - Legionella urine antigen neg  - Cryptococcal serum Ag neg  - UA c/w asymptomatic bacteriuria, UCx growing E.coli  - 04/05 CT chest: ground glass opacities throughout b/l lung fields c/w PCP pneumonia vs. COVID vs. ARDS vs. opportunistic infection    RECOMMENDATIONS:  - Continue Biktarvy   - Continue with zosyn 3.375g IV q6h  - Continue with bactrim 500 mg IV q8h  - Continue 21 day steroid taper for severe PCP  - F/u BCX 04/04  - F/u fungitell  - F/u induced sputum with culture + PCP PCR  - F/u Histoplasma Ag  - Please obtain records from [facility] at [address] [place] [address] for collateral on HIV treatment history    Medical Student Note  ID Team 1 will continue to follow  Note not finalized until attested by attending

## 2022-04-06 NOTE — PROGRESS NOTE ADULT - SUBJECTIVE AND OBJECTIVE BOX
Patient is a 41y old  Male who presents with a chief complaint of acute hypoxic respiratory failure (06 Apr 2022 09:03)    INTERVAL HPI/OVERNIGHT EVENTS:   No acute overnight events.  Patient seen at bedside in AM, reports breathing is much improved, still some non-productive cough but overall improving.  Patient does not report n/v/f/c, chest pain, palpitations, dizziness, light headed, abdominal pain, diarrhea, constipation, dysuria, urgency, frequency.     Vital Signs Last 24 Hrs  T(C): 36.7 (06 Apr 2022 09:00), Max: 36.8 (05 Apr 2022 13:00)  T(F): 98.1 (06 Apr 2022 09:00), Max: 98.3 (05 Apr 2022 13:00)  HR: 63 (06 Apr 2022 10:00) (63 - 90)  BP: 91/59 (06 Apr 2022 10:00) (76/56 - 97/67)  BP(mean): 70 (06 Apr 2022 10:00) (59 - 78)  ABP: 92/62 (06 Apr 2022 10:00) (73/51 - 104/69)  ABP(mean): 72 (06 Apr 2022 10:00) (59 - 81)  RR: 20 (06 Apr 2022 10:00) (15 - 28)  SpO2: 94% (06 Apr 2022 10:00) (93% - 98%)    I&O's Summary    05 Apr 2022 07:01  -  06 Apr 2022 07:00  --------------------------------------------------------  IN: 1977 mL / OUT: 2070 mL / NET: -93 mL    06 Apr 2022 07:01  -  06 Apr 2022 10:25  --------------------------------------------------------  IN: 600 mL / OUT: 355 mL / NET: 245 mL    LABS:                        10.0   12.06 )-----------( 290      ( 06 Apr 2022 05:31 )             28.0     04-06    133<L>  |  98  |  29<H>  ----------------------------<  146<H>  3.9   |  26  |  1.66<H>    Ca    8.0<L>      06 Apr 2022 05:31  Phos  2.8     04-06  Mg     2.3     04-06    TPro  5.8<L>  /  Alb  2.4<L>  /  TBili  0.3  /  DBili  x   /  AST  74<H>  /  ALT  96<H>  /  AlkPhos  56  04-06    PTT - ( 05 Apr 2022 05:49 )  PTT:53.9 sec    CAPILLARY BLOOD GLUCOSE    POCT Blood Glucose.: 126 mg/dL (05 Apr 2022 21:31)  POCT Blood Glucose.: 140 mg/dL (05 Apr 2022 16:33)  POCT Blood Glucose.: 178 mg/dL (05 Apr 2022 11:59)    ABG - ( 05 Apr 2022 15:17 )  pH, Arterial: 7.47  pH, Blood: x     /  pCO2: 38    /  pO2: 102   / HCO3: 28    / Base Excess: 3.9   /  SaO2: 97.0      RADIOLOGY & ADDITIONAL TESTS:    Consultant(s) Notes Reviewed:  [x ] YES  [ ] NO    MEDICATIONS  (STANDING):  aspirin  chewable 81 milliGRAM(s) Oral daily  atorvastatin 80 milliGRAM(s) Oral at bedtime  bictegravir 50 mG/emtricitabine 200 mG/tenofovir alafenamide 25 mG (BIKTARVY) 1 Tablet(s) Oral daily  chlorhexidine 2% Cloths 1 Application(s) Topical <User Schedule>  clopidogrel Tablet 75 milliGRAM(s) Oral daily  dextrose 5%. 1000 milliLiter(s) (100 mL/Hr) IV Continuous <Continuous>  dextrose 5%. 1000 milliLiter(s) (50 mL/Hr) IV Continuous <Continuous>  dextrose 50% Injectable 25 Gram(s) IV Push once  dextrose 50% Injectable 12.5 Gram(s) IV Push once  dextrose 50% Injectable 25 Gram(s) IV Push once  glucagon  Injectable 1 milliGRAM(s) IntraMuscular once  insulin lispro (ADMELOG) corrective regimen sliding scale   SubCutaneous Before meals and at bedtime  metoprolol tartrate 12.5 milliGRAM(s) Oral every 12 hours  pantoprazole    Tablet 40 milliGRAM(s) Oral before breakfast  piperacillin/tazobactam IVPB.. 3.375 Gram(s) IV Intermittent every 6 hours  predniSONE   Tablet 40 milliGRAM(s) Oral every 12 hours  trimethoprim / sulfamethoxazole IVPB 500 milliGRAM(s) IV Intermittent every 8 hours    MEDICATIONS  (PRN):  dextrose Oral Gel 15 Gram(s) Oral once PRN Blood Glucose LESS THAN 70 milliGRAM(s)/deciliter      PHYSICAL EXAM:  GENERAL:   HEAD:  Atraumatic, Normocephalic  EYES: EOMI, PERRLA, conjunctiva and sclera clear  NECK: Supple, No JVD, Normal thyroid, no enlarged nodes  NERVOUS SYSTEM:  Alert & Awake.   CHEST/LUNG: B/L good air entry; No rales, rhonchi, or wheezing  HEART: S1S2 normal, no S3, Regular rate and rhythm; No murmurs  ABDOMEN: Soft, Nontender, Nondistended; Bowel sounds present  EXTREMITIES:  2+ Peripheral Pulses, No clubbing, cyanosis, or edema  LYMPH: No lymphadenopathy noted  SKIN: No rashes or lesions    Care Discussed with Consultants/Other Providers [ x] YES  [ ] NO INCOMPLETE    Patient is a 41y old  Male who presents with a chief complaint of acute hypoxic respiratory failure (06 Apr 2022 09:03)    INTERVAL HPI/OVERNIGHT EVENTS:   No acute overnight events.  Patient seen at bedside in AM, reports breathing is much improved, still some non-productive cough but overall improving.  Patient does not report n/v/f/c, chest pain, palpitations, dizziness, light headed, abdominal pain, diarrhea, constipation, dysuria, urgency, frequency.     Vital Signs Last 24 Hrs  T(C): 36.7 (06 Apr 2022 09:00), Max: 36.8 (05 Apr 2022 13:00)  T(F): 98.1 (06 Apr 2022 09:00), Max: 98.3 (05 Apr 2022 13:00)  HR: 63 (06 Apr 2022 10:00) (63 - 90)  BP: 91/59 (06 Apr 2022 10:00) (76/56 - 97/67)  BP(mean): 70 (06 Apr 2022 10:00) (59 - 78)  ABP: 92/62 (06 Apr 2022 10:00) (73/51 - 104/69)  ABP(mean): 72 (06 Apr 2022 10:00) (59 - 81)  RR: 20 (06 Apr 2022 10:00) (15 - 28)  SpO2: 94% (06 Apr 2022 10:00) (93% - 98%)    I&O's Summary    05 Apr 2022 07:01  -  06 Apr 2022 07:00  --------------------------------------------------------  IN: 1977 mL / OUT: 2070 mL / NET: -93 mL    06 Apr 2022 07:01  -  06 Apr 2022 10:25  --------------------------------------------------------  IN: 600 mL / OUT: 355 mL / NET: 245 mL    LABS:                        10.0   12.06 )-----------( 290      ( 06 Apr 2022 05:31 )             28.0     04-06    133<L>  |  98  |  29<H>  ----------------------------<  146<H>  3.9   |  26  |  1.66<H>    Ca    8.0<L>      06 Apr 2022 05:31  Phos  2.8     04-06  Mg     2.3     04-06    TPro  5.8<L>  /  Alb  2.4<L>  /  TBili  0.3  /  DBili  x   /  AST  74<H>  /  ALT  96<H>  /  AlkPhos  56  04-06    PTT - ( 05 Apr 2022 05:49 )  PTT:53.9 sec    CAPILLARY BLOOD GLUCOSE    POCT Blood Glucose.: 126 mg/dL (05 Apr 2022 21:31)  POCT Blood Glucose.: 140 mg/dL (05 Apr 2022 16:33)  POCT Blood Glucose.: 178 mg/dL (05 Apr 2022 11:59)    ABG - ( 05 Apr 2022 15:17 )  pH, Arterial: 7.47  pH, Blood: x     /  pCO2: 38    /  pO2: 102   / HCO3: 28    / Base Excess: 3.9   /  SaO2: 97.0      RADIOLOGY & ADDITIONAL TESTS:    Consultant(s) Notes Reviewed:  [x ] YES  [ ] NO    MEDICATIONS  (STANDING):  aspirin  chewable 81 milliGRAM(s) Oral daily  atorvastatin 80 milliGRAM(s) Oral at bedtime  bictegravir 50 mG/emtricitabine 200 mG/tenofovir alafenamide 25 mG (BIKTARVY) 1 Tablet(s) Oral daily  chlorhexidine 2% Cloths 1 Application(s) Topical <User Schedule>  clopidogrel Tablet 75 milliGRAM(s) Oral daily  dextrose 5%. 1000 milliLiter(s) (100 mL/Hr) IV Continuous <Continuous>  dextrose 5%. 1000 milliLiter(s) (50 mL/Hr) IV Continuous <Continuous>  dextrose 50% Injectable 25 Gram(s) IV Push once  dextrose 50% Injectable 12.5 Gram(s) IV Push once  dextrose 50% Injectable 25 Gram(s) IV Push once  glucagon  Injectable 1 milliGRAM(s) IntraMuscular once  insulin lispro (ADMELOG) corrective regimen sliding scale   SubCutaneous Before meals and at bedtime  metoprolol tartrate 12.5 milliGRAM(s) Oral every 12 hours  pantoprazole    Tablet 40 milliGRAM(s) Oral before breakfast  piperacillin/tazobactam IVPB.. 3.375 Gram(s) IV Intermittent every 6 hours  predniSONE   Tablet 40 milliGRAM(s) Oral every 12 hours  trimethoprim / sulfamethoxazole IVPB 500 milliGRAM(s) IV Intermittent every 8 hours    MEDICATIONS  (PRN):  dextrose Oral Gel 15 Gram(s) Oral once PRN Blood Glucose LESS THAN 70 milliGRAM(s)/deciliter      PHYSICAL EXAM:  GENERAL:   HEAD:  Atraumatic, Normocephalic  EYES: EOMI, PERRLA, conjunctiva and sclera clear  NECK: Supple, No JVD, Normal thyroid, no enlarged nodes  NERVOUS SYSTEM:  Alert & Awake.   CHEST/LUNG: B/L good air entry; No rales, rhonchi, or wheezing  HEART: S1S2 normal, no S3, Regular rate and rhythm; No murmurs  ABDOMEN: Soft, Nontender, Nondistended; Bowel sounds present  EXTREMITIES:  2+ Peripheral Pulses, No clubbing, cyanosis, or edema  LYMPH: No lymphadenopathy noted  SKIN: No rashes or lesions    Care Discussed with Consultants/Other Providers [ x] YES  [ ] NO INCOMPLETE    Patient is a 41y old  Male who presents with a chief complaint of acute hypoxic respiratory failure (06 Apr 2022 09:03)    INTERVAL HPI/OVERNIGHT EVENTS:   No acute overnight events.  Patient seen at bedside in AM, reports breathing is much improved, still some non-productive cough but overall improving.  Patient does not report n/v/f/c, chest pain, palpitations, dizziness, light headed, abdominal pain, diarrhea, constipation, dysuria, urgency, frequency.     Vital Signs Last 24 Hrs  T(C): 36.7 (06 Apr 2022 09:00), Max: 36.8 (05 Apr 2022 13:00)  T(F): 98.1 (06 Apr 2022 09:00), Max: 98.3 (05 Apr 2022 13:00)  HR: 63 (06 Apr 2022 10:00) (63 - 90)  BP: 91/59 (06 Apr 2022 10:00) (76/56 - 97/67)  BP(mean): 70 (06 Apr 2022 10:00) (59 - 78)  ABP: 92/62 (06 Apr 2022 10:00) (73/51 - 104/69)  ABP(mean): 72 (06 Apr 2022 10:00) (59 - 81)  RR: 20 (06 Apr 2022 10:00) (15 - 28)  SpO2: 94% (06 Apr 2022 10:00) (93% - 98%)    I&O's Summary    05 Apr 2022 07:01  -  06 Apr 2022 07:00  --------------------------------------------------------  IN: 1977 mL / OUT: 2070 mL / NET: -93 mL    06 Apr 2022 07:01  -  06 Apr 2022 10:25  --------------------------------------------------------  IN: 600 mL / OUT: 355 mL / NET: 245 mL    LABS:                        10.0   12.06 )-----------( 290      ( 06 Apr 2022 05:31 )             28.0     04-06    133<L>  |  98  |  29<H>  ----------------------------<  146<H>  3.9   |  26  |  1.66<H>    Ca    8.0<L>      06 Apr 2022 05:31  Phos  2.8     04-06  Mg     2.3     04-06    TPro  5.8<L>  /  Alb  2.4<L>  /  TBili  0.3  /  DBili  x   /  AST  74<H>  /  ALT  96<H>  /  AlkPhos  56  04-06    PTT - ( 05 Apr 2022 05:49 )  PTT:53.9 sec    CAPILLARY BLOOD GLUCOSE    POCT Blood Glucose.: 126 mg/dL (05 Apr 2022 21:31)  POCT Blood Glucose.: 140 mg/dL (05 Apr 2022 16:33)  POCT Blood Glucose.: 178 mg/dL (05 Apr 2022 11:59)    ABG - ( 05 Apr 2022 15:17 )  pH, Arterial: 7.47  pH, Blood: x     /  pCO2: 38    /  pO2: 102   / HCO3: 28    / Base Excess: 3.9   /  SaO2: 97.0      RADIOLOGY & ADDITIONAL TESTS:    Consultant(s) Notes Reviewed:  [x ] YES  [ ] NO    MEDICATIONS  (STANDING):  aspirin  chewable 81 milliGRAM(s) Oral daily  atorvastatin 80 milliGRAM(s) Oral at bedtime  bictegravir 50 mG/emtricitabine 200 mG/tenofovir alafenamide 25 mG (BIKTARVY) 1 Tablet(s) Oral daily  chlorhexidine 2% Cloths 1 Application(s) Topical <User Schedule>  clopidogrel Tablet 75 milliGRAM(s) Oral daily  dextrose 5%. 1000 milliLiter(s) (100 mL/Hr) IV Continuous <Continuous>  dextrose 5%. 1000 milliLiter(s) (50 mL/Hr) IV Continuous <Continuous>  dextrose 50% Injectable 25 Gram(s) IV Push once  dextrose 50% Injectable 12.5 Gram(s) IV Push once  dextrose 50% Injectable 25 Gram(s) IV Push once  glucagon  Injectable 1 milliGRAM(s) IntraMuscular once  insulin lispro (ADMELOG) corrective regimen sliding scale   SubCutaneous Before meals and at bedtime  metoprolol tartrate 12.5 milliGRAM(s) Oral every 12 hours  pantoprazole    Tablet 40 milliGRAM(s) Oral before breakfast  piperacillin/tazobactam IVPB.. 3.375 Gram(s) IV Intermittent every 6 hours  predniSONE   Tablet 40 milliGRAM(s) Oral every 12 hours  trimethoprim / sulfamethoxazole IVPB 500 milliGRAM(s) IV Intermittent every 8 hours    MEDICATIONS  (PRN):  dextrose Oral Gel 15 Gram(s) Oral once PRN Blood Glucose LESS THAN 70 milliGRAM(s)/deciliter      PHYSICAL EXAM:  GENERAL: Resting comfortably, no acute distress  HEAD:  Atraumatic, Normocephalic  EYES: EOMI, PERRLA, conjunctiva and sclera clear  NECK: Supple, No JVD, Normal thyroid, no enlarged nodes  NERVOUS SYSTEM:  Alert & Awake.   CHEST/LUNG: B/L good air entry; No rales, rhonchi, or wheezing  HEART: S1S2 normal, no S3, Regular rate and rhythm; No murmurs  ABDOMEN: Soft, Nontender, Nondistended; Bowel sounds present  EXTREMITIES:  2+ Peripheral Pulses, No clubbing, cyanosis, or edema  LYMPH: No lymphadenopathy noted  SKIN: No rashes or lesions    Care Discussed with Consultants/Other Providers [ x] YES  [ ] NO Patient is a 41y old  Male who presents with a chief complaint of acute hypoxic respiratory failure (06 Apr 2022 09:03)    INTERVAL HPI/OVERNIGHT EVENTS:   No acute overnight events.  Patient seen at bedside in AM, reports breathing is much improved, still some non-productive cough but overall improving.  Patient does not report n/v/f/c, chest pain, palpitations, dizziness, light headed, abdominal pain, diarrhea, constipation, dysuria, urgency, frequency.     Vital Signs Last 24 Hrs  T(C): 36.7 (06 Apr 2022 09:00), Max: 36.8 (05 Apr 2022 13:00)  T(F): 98.1 (06 Apr 2022 09:00), Max: 98.3 (05 Apr 2022 13:00)  HR: 63 (06 Apr 2022 10:00) (63 - 90)  BP: 91/59 (06 Apr 2022 10:00) (76/56 - 97/67)  BP(mean): 70 (06 Apr 2022 10:00) (59 - 78)  ABP: 92/62 (06 Apr 2022 10:00) (73/51 - 104/69)  ABP(mean): 72 (06 Apr 2022 10:00) (59 - 81)  RR: 20 (06 Apr 2022 10:00) (15 - 28)  SpO2: 94% (06 Apr 2022 10:00) (93% - 98%)    I&O's Summary    05 Apr 2022 07:01  -  06 Apr 2022 07:00  --------------------------------------------------------  IN: 1977 mL / OUT: 2070 mL / NET: -93 mL    06 Apr 2022 07:01  -  06 Apr 2022 10:25  --------------------------------------------------------  IN: 600 mL / OUT: 355 mL / NET: 245 mL    LABS:                        10.0   12.06 )-----------( 290      ( 06 Apr 2022 05:31 )             28.0     04-06    133<L>  |  98  |  29<H>  ----------------------------<  146<H>  3.9   |  26  |  1.66<H>    Ca    8.0<L>      06 Apr 2022 05:31  Phos  2.8     04-06  Mg     2.3     04-06    TPro  5.8<L>  /  Alb  2.4<L>  /  TBili  0.3  /  DBili  x   /  AST  74<H>  /  ALT  96<H>  /  AlkPhos  56  04-06    PTT - ( 05 Apr 2022 05:49 )  PTT:53.9 sec    CAPILLARY BLOOD GLUCOSE    POCT Blood Glucose.: 126 mg/dL (05 Apr 2022 21:31)  POCT Blood Glucose.: 140 mg/dL (05 Apr 2022 16:33)  POCT Blood Glucose.: 178 mg/dL (05 Apr 2022 11:59)    ABG - ( 05 Apr 2022 15:17 )  pH, Arterial: 7.47  pH, Blood: x     /  pCO2: 38    /  pO2: 102   / HCO3: 28    / Base Excess: 3.9   /  SaO2: 97.0      RADIOLOGY & ADDITIONAL TESTS:    Consultant(s) Notes Reviewed:  [x ] YES  [ ] NO    MEDICATIONS  (STANDING):  aspirin  chewable 81 milliGRAM(s) Oral daily  atorvastatin 80 milliGRAM(s) Oral at bedtime  bictegravir 50 mG/emtricitabine 200 mG/tenofovir alafenamide 25 mG (BIKTARVY) 1 Tablet(s) Oral daily  chlorhexidine 2% Cloths 1 Application(s) Topical <User Schedule>  clopidogrel Tablet 75 milliGRAM(s) Oral daily  dextrose 5%. 1000 milliLiter(s) (100 mL/Hr) IV Continuous <Continuous>  dextrose 5%. 1000 milliLiter(s) (50 mL/Hr) IV Continuous <Continuous>  dextrose 50% Injectable 25 Gram(s) IV Push once  dextrose 50% Injectable 12.5 Gram(s) IV Push once  dextrose 50% Injectable 25 Gram(s) IV Push once  glucagon  Injectable 1 milliGRAM(s) IntraMuscular once  insulin lispro (ADMELOG) corrective regimen sliding scale   SubCutaneous Before meals and at bedtime  metoprolol tartrate 12.5 milliGRAM(s) Oral every 12 hours  pantoprazole    Tablet 40 milliGRAM(s) Oral before breakfast  piperacillin/tazobactam IVPB.. 3.375 Gram(s) IV Intermittent every 6 hours  predniSONE   Tablet 40 milliGRAM(s) Oral every 12 hours  trimethoprim / sulfamethoxazole IVPB 500 milliGRAM(s) IV Intermittent every 8 hours    MEDICATIONS  (PRN):  dextrose Oral Gel 15 Gram(s) Oral once PRN Blood Glucose LESS THAN 70 milliGRAM(s)/deciliter      PHYSICAL EXAM:  GENERAL: Resting comfortably, no acute distress  HEAD:  Atraumatic, Normocephalic  EYES: EOMI, PERRLA, conjunctiva and sclera clear  NECK: Supple, No JVD, Normal thyroid, no enlarged nodes  NERVOUS SYSTEM:  Alert & Awake.   CHEST/LUNG: B/L good air entry; No rales, rhonchi, or wheezing  HEART: S1S2 normal, no S3, Regular rate and rhythm; No murmurs  ABDOMEN: Soft, Nontender, Nondistended; Bowel sounds present  EXTREMITIES:  2+ Peripheral Pulses, No clubbing, cyanosis, or edema  LYMPH: No lymphadenopathy noted  SKIN: No rashes or lesions    Care Discussed with Consultants/Other Providers [ x] YES  [ ] NO

## 2022-04-06 NOTE — PROGRESS NOTE ADULT - ASSESSMENT
This is a 41M with PMH CAD, MI (Jan 2022 s/p GARFIELD), left heart thrombus (on Xarelto), HFrEF, colon cancer (s/p resection 2016, and chemotherapy, no complications), HIV who presents for 2 days of shortness of breath. Patient admitted for hypoxemic respiratory failure in the setting of cardiogenic pulmonary edema requiring lasix gtt. Patient with underlying interstitial lung process and given fevers and low CD4, concern for PCP pneumonia, for which pulmonology has been consulted.     #Acute hypoxemic respiratory failure   #Cardiogenic pulmonary edema  #Reticulonodular opacities on CXR  #R/o PCP    Patient presenting as above with clear component of cardiogenic pulmonary edema requiring BIPAP and HFNC. He has had significant improvement in CXR as well as clinically and planned to de-escalate to NC today. POCUS still with diffuse B lines bilaterally, and L small consolidation of lung. No pleural effusions. However, CXR remains abnormal with reticulonodular opacities concerning for primary intrapulmonary process, which can be consistent with opportunistic infections such as PCP or other fungal infections. Nonetheless, the most common cause of pneumonia in immunocompromised patients remains bacterial with Strep pneumo or MSSA.  which is sensitive for PCP pneumonia but not specific.     Recommend:  - On 6L NC, wean as tolerated. Spo2 goal 92% and above  - Agree with broad coverage antibiotics  - Empiric coverage with bactrim and steroids for severe PCP, ID following  - Will f/u sputum culture as well as sputum for PCP PCR  - f/u serum galactomannan, histo Ag, crypto Ag, and Fungitell  - Management of heart failure per primary CCU team  - CT Chest with interlobular septal thickening and scattered ground glass opacities which can be present in fluid overload or atypical infectious processes such as PCP  - Will plan for bronchoscopy pending endoscopy schedule- patient needs cardiology clearance prior to undergoing bronchoscopy     Patient discussed with attending Dr. Dorsey.  This is a 41M with PMH CAD, MI (Jan 2022 s/p GARFIELD), left heart thrombus (on Xarelto), HFrEF, colon cancer (s/p resection 2016, and chemotherapy, no complications), HIV who presents for 2 days of shortness of breath. Patient admitted for hypoxemic respiratory failure in the setting of cardiogenic pulmonary edema requiring lasix gtt. Patient with underlying interstitial lung process and given fevers and low CD4, concern for PCP pneumonia, for which pulmonology has been consulted.     #Acute hypoxemic respiratory failure   #Cardiogenic pulmonary edema  #Reticulonodular opacities on CXR  #R/o PCP    Patient presenting as above with clear component of cardiogenic pulmonary edema requiring BIPAP and HFNC. He has had significant improvement in CXR as well as clinically and planned to de-escalate to NC today. POCUS still with diffuse B lines bilaterally, and L small consolidation of lung. No pleural effusions. However, CXR remains abnormal with reticulonodular opacities concerning for primary intrapulmonary process, which can be consistent with opportunistic infections such as PCP or other fungal infections. Nonetheless, the most common cause of pneumonia in immunocompromised patients remains bacterial with Strep pneumo or MSSA.  which is sensitive for PCP pneumonia but not specific.     Recommend:  - On 6L NC, wean as tolerated. Spo2 goal 92% and above  - Agree with broad coverage antibiotics  - Empiric coverage with bactrim and steroids for severe PCP, ID following  - Will f/u sputum culture as well as sputum for PCP PCR  - f/u serum galactomannan, histo Ag, crypto Ag, and Fungitell  - Management of heart failure per primary CCU team  - CT Chest with interlobular septal thickening and scattered ground glass opacities which can be present in fluid overload or atypical infectious processes such as PCP  - Would plan to bronch, however patient is not cleared from a cardiac perspective and is planned to undergo cardiac catheterization today    Patient discussed with attending Dr. Dorsey.  This is a 41M with PMH CAD, MI (Jan 2022 s/p GARFIELD), left heart thrombus (on Xarelto), HFrEF, colon cancer (s/p resection 2016, and chemotherapy, no complications), HIV who presents for 2 days of shortness of breath. Patient admitted for hypoxemic respiratory failure in the setting of cardiogenic pulmonary edema requiring lasix gtt. Patient with underlying interstitial lung process and given fevers and low CD4, concern for PCP pneumonia, for which pulmonology has been consulted.     #Acute hypoxemic respiratory failure   #Cardiogenic pulmonary edema  #Reticulonodular opacities on CXR  #R/o PCP    Patient presenting as above with clear component of cardiogenic pulmonary edema requiring BIPAP and HFNC. He has had significant improvement in CXR as well as clinically and planned to de-escalate to NC today. POCUS still with diffuse B lines bilaterally, and L small consolidation of lung. No pleural effusions. However, CXR remains abnormal with reticulonodular opacities concerning for primary intrapulmonary process, which can be consistent with opportunistic infections such as PCP or other fungal infections. Nonetheless, the most common cause of pneumonia in immunocompromised patients remains bacterial with Strep pneumo or MSSA.  which is sensitive for PCP pneumonia but not specific.     Recommend:  - On 6L NC, wean as tolerated. Spo2 goal 92% and above  - Agree with broad coverage antibiotics  - Empiric coverage with bactrim and steroids for severe PCP, ID following  - Will f/u sputum culture as well as sputum for PCP PCR  - f/u serum galactomannan, histo Ag, crypto Ag, and Fungitell  - Management of heart failure per primary CCU team  - CT Chest with interlobular septal thickening and scattered ground glass opacities which can be present in fluid overload or atypical infectious processes such as PCP  - Would plan to bronch, however patient is not cleared from a cardiac perspective and is planned to undergo cardiac catheterization today. Will re-evaluate after acute cardiac issue resolves    Patient discussed with attending Dr. Dorsey.  This is a 41M with PMH CAD, MI (Jan 2022 s/p GARFIELD), left heart thrombus (on Xarelto), HFrEF, colon cancer (s/p resection 2016, and chemotherapy, no complications), HIV who presents for 2 days of shortness of breath. Patient admitted for hypoxemic respiratory failure in the setting of cardiogenic pulmonary edema requiring lasix gtt. Patient with underlying interstitial lung process and given fevers and low CD4, concern for PCP pneumonia, for which pulmonology has been consulted.     #Acute hypoxemic respiratory failure   #Cardiogenic pulmonary edema  #Reticulonodular opacities on CXR  #R/o PCP    Patient presenting as above with clear component of cardiogenic pulmonary edema requiring BIPAP and HFNC. He has had significant improvement in CXR as well as clinically and now on nasal cannula. POCUS still with diffuse B lines bilaterally, and L small consolidation of lung. No pleural effusions. However, CXR remains abnormal with reticulonodular opacities concerning for primary intrapulmonary process, which can be consistent with opportunistic infections such as PCP or other fungal infections. Nonetheless, the most common cause of pneumonia in immunocompromised patients remains bacterial with Strep pneumo or MSSA.  which is sensitive for PCP pneumonia but not specific. The most likely primary etiology of his shortness of breath remains pulmonary edema, and if PCP pneumonia is present, it is not the primary  of symptoms.    Recommend:  - On 6L NC, wean as tolerated. Spo2 goal 92% and above  - Agree with broad coverage antibiotics  - ID following, on empiric PCP treatment  - Crypto antigen negative  - Will f/u sputum PCP PCR  - Please send sputum culture for bacteria  - f/u serum galactomannan, histo Ag, and Fungitell  - CT Chest with interlobular septal thickening and scattered ground glass opacities which can be present in fluid overload or atypical infectious processes such as PCP  - If the sputum PCP PCR is negative, can consider bronchoscopy. However patient is not cleared from a cardiac perspective and is planned to undergo cardiac catheterization today   - Management of heart failure per primary CCU team    Patient discussed with attending Dr. Dorsey.  This is a 41M with PMH CAD, MI (Jan 2022 s/p GARFIELD), left heart thrombus (on Xarelto), HFrEF, colon cancer (s/p resection 2016, and chemotherapy, no complications), HIV who presents for 2 days of shortness of breath. Patient admitted for hypoxemic respiratory failure in the setting of cardiogenic pulmonary edema requiring lasix gtt. Patient with underlying interstitial lung process and given fevers and low CD4, concern for PCP pneumonia, for which pulmonology has been consulted.     #Acute hypoxemic respiratory failure   #Cardiogenic pulmonary edema  #GGO on CT chest    Patient presenting as above with clear component of cardiogenic pulmonary edema requiring BIPAP and HFNC. He has had significant improvement in CXR as well as clinically and now on nasal cannula. POCUS still with diffuse B lines bilaterally, and L small consolidation of lung. No pleural effusions. CT chest with interlobular septal thickening and central ground glass opacities which can be consistent with fluid overload vs atypical opportunistic infections such as PCP or other fungal or bacterial infections. Nonetheless, the most common cause of pneumonia in immunocompromised patients remains bacterial with Strep pneumo or MSSA.  which is sensitive for PCP pneumonia but not specific. The most likely primary etiology of his shortness of breath remains pulmonary edema, and if PCP pneumonia is present, it is not the primary  of symptoms.    Recommend:  - On 6L NC, wean as tolerated. Spo2 goal 92% and above  - Agree with broad coverage antibiotics  - ID following, on empiric PCP treatment  - Crypto antigen negative  - Will f/u sputum PCP PCR  - Please send sputum culture for bacteria  - f/u serum galactomannan, histo Ag, and Fungitell  - CT Chest with interlobular septal thickening and scattered ground glass opacities which can be present in fluid overload or atypical infectious processes such as PCP  - If the sputum PCP PCR is negative, can consider bronchoscopy. However patient is not cleared from a cardiac perspective and is planned to undergo cardiac catheterization today   - Management of heart failure per primary CCU team    Patient discussed with attending Dr. Dorsey.  This is a 41M with PMH CAD, MI (Jan 2022 s/p GARFIELD), left heart thrombus (on Xarelto), HFrEF, colon cancer (s/p resection 2016, and chemotherapy, no complications), HIV who presents for 2 days of shortness of breath. Patient admitted for hypoxemic respiratory failure in the setting of cardiogenic pulmonary edema requiring lasix gtt. Patient with underlying interstitial lung process and given fevers and low CD4, concern for PCP pneumonia, for which pulmonology has been consulted.     #Acute hypoxemic respiratory failure   #Cardiogenic pulmonary edema  #GGO on CT chest    Patient presenting as above with clear component of cardiogenic pulmonary edema requiring BIPAP and HFNC. He has had significant improvement in CXR as well as clinically and now on nasal cannula. POCUS still with diffuse B lines bilaterally, and L small consolidation of lung. No pleural effusions. CT chest with interlobular septal thickening and central ground glass opacities which can be consistent with fluid overload vs atypical opportunistic infections such as PCP or other fungal or bacterial infections. Nonetheless, the most common cause of pneumonia in immunocompromised patients remains bacterial with Strep pneumo or MSSA.  which is sensitive for PCP pneumonia but not specific. The most likely primary etiology of his shortness of breath remains pulmonary edema, and if PCP pneumonia is present, it is not the primary  of symptoms.    Recommend:  - On 6L NC, wean as tolerated. Spo2 goal 92% and above  - Agree with broad coverage antibiotics  - On empiric PCP treatment. However, agree with ID that PCP pneumonia is less likely  - Will f/u sputum PCP PCR, if negative would stop bactrim and steroids  - Crypto antigen negative  - Please send sputum culture for bacteria  - f/u serum galactomannan, histo Ag, and Fungitell  - CT Chest with interlobular septal thickening and scattered ground glass opacities which can be present in fluid overload or atypical infectious processes such as PCP  - If the sputum PCP PCR is negative, can consider bronchoscopy. However patient is not cleared from a cardiac perspective and is planned to undergo cardiac catheterization today   - Management of heart failure per primary CCU team    Patient discussed with attending Dr. Dorsey.

## 2022-04-06 NOTE — PROGRESS NOTE ADULT - SUBJECTIVE AND OBJECTIVE BOX
INCOMPLETE NOTE - NOTE IN PROGRESS**  Infectious Disease Progress Note    Interval Events:    Subjective:  Patient seen and evaluated at bedside.    ROS:  negative except as above      ANTIBIOTICS/RELEVANT:  antimicrobials  bictegravir 50 mG/emtricitabine 200 mG/tenofovir alafenamide 25 mG (BIKTARVY) 1 Tablet(s) Oral daily  piperacillin/tazobactam IVPB.. 3.375 Gram(s) IV Intermittent every 6 hours  trimethoprim / sulfamethoxazole IVPB 500 milliGRAM(s) IV Intermittent every 8 hours    immunologic:    OTHER:  aspirin  chewable 81 milliGRAM(s) Oral daily  atorvastatin 80 milliGRAM(s) Oral at bedtime  chlorhexidine 2% Cloths 1 Application(s) Topical <User Schedule>  clopidogrel Tablet 75 milliGRAM(s) Oral daily  dextrose 5%. 1000 milliLiter(s) IV Continuous <Continuous>  dextrose 5%. 1000 milliLiter(s) IV Continuous <Continuous>  dextrose 50% Injectable 25 Gram(s) IV Push once  dextrose 50% Injectable 12.5 Gram(s) IV Push once  dextrose 50% Injectable 25 Gram(s) IV Push once  dextrose Oral Gel 15 Gram(s) Oral once PRN  glucagon  Injectable 1 milliGRAM(s) IntraMuscular once  insulin lispro (ADMELOG) corrective regimen sliding scale   SubCutaneous Before meals and at bedtime  metoprolol tartrate 12.5 milliGRAM(s) Oral every 12 hours  pantoprazole    Tablet 40 milliGRAM(s) Oral before breakfast  predniSONE   Tablet 40 milliGRAM(s) Oral every 12 hours      Allergies    No Known Allergies    Intolerances        Objective:  Vital Signs Last 24 Hrs  T(C): 36.2 (06 Apr 2022 06:27), Max: 36.8 (05 Apr 2022 13:00)  T(F): 97.2 (06 Apr 2022 06:27), Max: 98.3 (05 Apr 2022 13:00)  HR: 67 (06 Apr 2022 08:00) (63 - 91)  BP: 92/58 (06 Apr 2022 08:00) (76/56 - 97/67)  BP(mean): 70 (06 Apr 2022 08:00) (59 - 78)  RR: 20 (06 Apr 2022 08:00) (15 - 28)  SpO2: 94% (06 Apr 2022 08:00) (93% - 98%)    PHYSICAL EXAM: **NOTE IN PROGRESS**  General: NAD  HEENT: NC/AT; PERRL, clear conjunctiva  Neck: supple  Respiratory: CTA b/l  Cardiovascular: +S1/S2; RRR  Abdomen: soft, NT/ND; +BS x4  Extremities: WWP, 2+ peripheral pulses b/l; no LE edema  Skin: normal color and turgor; no rash  Neurological:       LABS:                        10.0   12.06 )-----------( 290      ( 06 Apr 2022 05:31 )             28.0     04-06    133<L>  |  98  |  29<H>  ----------------------------<  146<H>  3.9   |  26  |  1.66<H>    Ca    8.0<L>      06 Apr 2022 05:31  Phos  2.8     04-06  Mg     2.3     04-06    TPro  5.8<L>  /  Alb  2.4<L>  /  TBili  0.3  /  DBili  x   /  AST  74<H>  /  ALT  96<H>  /  AlkPhos  56  04-06    PTT - ( 05 Apr 2022 05:49 )  PTT:53.9 sec      MICROBIOLOGY:            RECENT CULTURES:  04-04 @ 04:10  Catheterized Catheterized  --  --  --    >100,000 CFU/ml Escherichia coli  --  04-04 @ 03:35  .Blood Blood  --  --  --    No growth to date.  --      RADIOLOGY & ADDITIONAL STUDIES: INFECTIOUS DISEASE PROGRESS NOTE    Interval Events: Pt transitioned from [facility] to NC.     Subjective:  Patient seen and evaluated at bedside. Pt in NAD, laying in bed. Pt reports improved breathing with no SOB. He continues to endorse dry cough with some congestion, denies fever, chills, sore throat. He denies N/V, abd pain, diarrhea, dysuria, urgency, or frequency.     ROS:  CONSTITUTIONAL: No weakness, fevers or chills  EYES/ENT: No visual changes;  No vertigo or throat pain   NECK: No pain or stiffness  RESPIRATORY: + dry cough, no SOB, no wheezing, no hemoptysis  CARDIOVASCULAR:  no chest pain, no palpitations  GASTROINTESTINAL: No abdominal or epigastric pain. No nausea, vomiting, or hematemesis; No diarrhea or constipation. No melena or hematochezia.  GENITOURINARY: No dysuria, frequency or hematuria  NEUROLOGICAL: No numbness or weakness  SKIN: No itching, rashes     ANTIBIOTICS/RELEVANT:  antimicrobials  bictegravir 50 mG/emtricitabine 200 mG/tenofovir alafenamide 25 mG (BIKTARVY) 1 Tablet(s) Oral daily  piperacillin/tazobactam IVPB.. 3.375 Gram(s) IV Intermittent every 6 hours  trimethoprim / sulfamethoxazole IVPB 500 milliGRAM(s) IV Intermittent every 8 hours    immunologic:    OTHER:  aspirin  chewable 81 milliGRAM(s) Oral daily  atorvastatin 80 milliGRAM(s) Oral at bedtime  chlorhexidine 2% Cloths 1 Application(s) Topical <User Schedule>  clopidogrel Tablet 75 milliGRAM(s) Oral daily  dextrose 5%. 1000 milliLiter(s) IV Continuous <Continuous>  dextrose 5%. 1000 milliLiter(s) IV Continuous <Continuous>  dextrose 50% Injectable 25 Gram(s) IV Push once  dextrose 50% Injectable 12.5 Gram(s) IV Push once  dextrose 50% Injectable 25 Gram(s) IV Push once  dextrose Oral Gel 15 Gram(s) Oral once PRN  glucagon  Injectable 1 milliGRAM(s) IntraMuscular once  insulin lispro (ADMELOG) corrective regimen sliding scale   SubCutaneous Before meals and at bedtime  metoprolol tartrate 12.5 milliGRAM(s) Oral every 12 hours  pantoprazole    Tablet 40 milliGRAM(s) Oral before breakfast  predniSONE   Tablet 40 milliGRAM(s) Oral every 12 hours    Allergies    No Known Allergies    Intolerances      Objective:  Vital Signs Last 24 Hrs  T(C): 36.2 (06 Apr 2022 06:27), Max: 36.8 (05 Apr 2022 13:00)  T(F): 97.2 (06 Apr 2022 06:27), Max: 98.3 (05 Apr 2022 13:00)  HR: 67 (06 Apr 2022 08:00) (63 - 91)  BP: 92/58 (06 Apr 2022 08:00) (76/56 - 97/67)  BP(mean): 70 (06 Apr 2022 08:00) (59 - 78)  RR: 20 (06 Apr 2022 08:00) (15 - 28)  SpO2: 94% (06 Apr 2022 08:00) (93% - 98%)    PHYSICAL EXAM:  General: NAD, laying in bed, on NC 6L  HEENT: NC/AT; PERRL, clear conjunctiva  Neck: supple  Respiratory: clear to auscultation b/l, no accessory muscle use  Cardiovascular: +S1/S2; RRR  Abdomen: soft, NT/ND; +BS x4  : Ly draining dark yellow urine  Extremities: 2+ peripheral pulses b/l; trace pitting edema  Skin: normal color and turgor; no rash  Neurological: AAOx3. No focal neurologic deficits. Gait deferred.   Psychiatry: Mood and affect appropriate.    LABS:                        10.0   12.06 )-----------( 290      ( 06 Apr 2022 05:31 )             28.0     04-06    133<L>  |  98  |  29<H>  ----------------------------<  146<H>  3.9   |  26  |  1.66<H>    Ca    8.0<L>      06 Apr 2022 05:31  Phos  2.8     04-06  Mg     2.3     04-06    TPro  5.8<L>  /  Alb  2.4<L>  /  TBili  0.3  /  DBili  x   /  AST  74<H>  /  ALT  96<H>  /  AlkPhos  56  04-06    PTT - ( 05 Apr 2022 05:49 )  PTT:53.9 sec      MICROBIOLOGY  04/04 COVID: negative    Hepatitis B Surface Antigen (04.06.22 @ 12:59)    Hepatitis B Surface Antigen: Reactive  Hepatitis B Core Antibody, Total (04.06.22 @ 06:12)    Hepatitis B Core Antibody, Total: Reactive  Hepatitis B Surface Antibody (04.05.22 @ 15:14)    Hepatitis B Surface Antibody: Nonreact    Hepatitis C Virus RNA Detection by PCR (04.05.22 @ 12:08)    Hepatitis C RNA, Interpretation: NotDete    Cryptococcal Antigen - Serum (04.05.22 @ 12:13)    Cryptococcal Antigen - Serum: Negative    RECENT CULTURES  04/04 BCx: NGTD  04/04 UCx: E.coli    IMAGING    EXAM:  CT CHEST                        PROCEDURE DATE:  04/05/2022     LUNG PARENCHYMA / AIRWAYS:  Evaluation of the lung parenchyma   demonstrates diffuse severe patchy groundglass opacity throughout the   upper and lower lobes with subpleural sparing.  No region of confluent consolidation. No endobronchial lesion. No   pulmonary consolidation or mass.    IMPRESSION:  Findings consistent with PCP pneumonia, COVID 19, ARDS or other atypical   opportunistic infection in this HIV-positive patient.

## 2022-04-06 NOTE — PROGRESS NOTE ADULT - ASSESSMENT
41M PMH CAD, MI (Jan 2022, stent), Left heart thrombus (Xarelto) CHF (unknown EF), colon cancer (PAWAN, s/p resection/chemo 2016), HIV (Biktarvy), former smoker (<1 pack year) BIBEMS for worsening shortness of breath for two days with nonproductive cough, admitted to CCU for acute hypoxic respiratory failure 2/2 PNA vs acute heart failure exacerbation.    NEURO  No active issues, AAOX3, neuro intact    CVS  #Acute Decompensated Heart Failure  Patient with HFrEF (EF 10-15%) likely due to ischemic cardiomypathy. Primary cardiologist is at [facility] (Dr. Saniya Ramirez and Dr. Marlo Kapoor). Home meds: Entresto, torsemide, Toprol XL.  On admission, signs and symptoms of exacerbation diffuse pulmonary edema on XR, volume overloaded on exam with edema, S3, ProBNP 34,267. S/p lasix 80mg and NG drip in ED, currently being monitored off diuretics.  Patient euvolemic to dry on exam, normal output state. TTE (4/4) shows EF 10-15%, no LV thrombus, dilated LV, severely dilated LA, mild/mod TR/MR.  - holding Lasix gtt I/S/O hypotension  - introduce GDMT as appropriate:        - Continue Lopressor 12.5mg PO BID        - holding Entresto, restart when BP stable        - holding torsemide, restart when BP stable  - Right heart cath today  - strict I&Os    #CAD  #History of MI s/p PCI  MI in Jan 2022 at [facility], stent placed to [facility], stent re-stenosed. Home medications Plavix & Xarelto, atorvastatin 80mg. Primary cardiologist is at [facility] (Dr. Saniya Ramirez and Dr. Marlo Kapoor). Lipid panel unremarkable.  - discontinued home Xarelto 15mg (no longer with LV thrombus, s/p 3mo tx)  - Continue ASA 81mg daily starting tomorrow  - Continue Plavix 75mg daily  - Continue atorvastatin 80mg daily    #H/o LV thrombus  After initial MI in 1/2022, small LV thrombus. Last seen on 1/6. No longer seen on subsequent studies (2/2022). Thrombus not seen on TTE on current admission. Now s/p 3 months of Xarelto.    PULM  #Acute Hypoxic Respiratory Failure likely 2/2 CHF exacerbation vs PNA  Worsening SOB x2d, found to be in AHRF requiring BIPAP on admission, etiology likely PCP pneumonia with some contribution from CHF exacerbation. On admission, febrile initially, elevated LDH, d-dimer, LDH, procal, ESR, CRP. Patient clinically euvolemic, has been afebrile since admission, being treated with Bactrim and prednisone for PCP pneumonia.  AHRF improving overall, CXR with improving bilateral hilar congestion and diffuse bilateral opacities  Negative COVID, MRSA nares, Ur legionella/strep.  s/p CTX in ED and vanc x1 dose  #R Pulmonary embolism   Hypoxia, tachycardia, tachypnea,  elevated D-Dimer, elevated pro-BNP, history of cancer. No calf tenderness, LE dopplers negative.  - monitor RR and SpO2    - on HFNC, wean as tolerated  - holding Lasix gtt, as pt seems euvolemic  - pulmonlogy consulted, appreciate recs        - send sputum for culture & PCP        - proceed with CT chest        - no bronchoscopy at this time  - ID consulted, appreciate recs        - CD4 174 on current admission        - START bactrim 500mg IV q8h for empiric PCP tx        - START prednisone 40mg PO BID        - c/w zosyn 3.375g q6h for possible bacterial PNA        - f/u fungitell, TB quant    #Pneumonia (bacterial vs. PCP)  S/p CTX, decadron 10mg. BIPAP in ED with improvement in SpO2.  COVID, MRSA nares, Ur legionella/strep neg.  Elevated Procal 5.27,   - AHRF plan, as above  - ID & pulmonology following, as above    GI  #Transaminitis  May be i/s/o acute HF exacerbation with poor perfusion transaminitis lactate) or hepatitis (i/s/o immunocompromise from HIV). Denied EtOH. Hepatitis panel wnl.  - trend CMP  - f/u remaining hepatitis labs (HBV cAb total/sAb/sAg, HCV PCR)    #Colon Cancer s/p Resection  Resection in 2016. Colostomy bag in place. Follows with Dr. Guilherme Travis with [facility]. No active disease.  - outpatient follow-up    RENAL  #KULWINDER vs KULWINDER on CKD  Elevated Cr. Unknown baseline. Likely pre-renal as also with lactate, transaminitis in acute heart failure exacerbation. FeUrea pre-renal.  - continue to trend creatinine  - avoid nephrotoxic medications    HEME  #Leukocytosis  WBC 13.87 on admission and febrile to 102.5. Procal 5.2. Being treated for possible CAP vs. PCP PNA. Previously on decadron earlier in course.  - on prednisone for empiric PCP tx  - abx plan, as above  - continue to trend    ENDO  - A1c 5.2, no PMH of diabetes  - low ISS  - on prednisone for empiric PCP tx  - FSG before meals and at bedtime    ID  #Severe sepsis  Presented with lactate 3.8, tachypnea, leukocytosis with neutrophilic predominance and fever.   Likely due to PNA vs acute decompensated HF vs AHRF due to COVID. Less likely from UTI as denies urinary symptoms. Lactate now cleared.  s/p CTX 1gr in ED. s/p vanc x1. RVP, COVID, MRSA nares negative.  - c/w zosyn 3.375g q6h  - START bactrim for empiric PCP tx, as above  - f/u blood culture, UCx  - will send sputum cx, PCP sputum    #HIV  Febrile on admission. PMH HIV (dx 2010). States compliance on Biktarvy.   Per pt, last sexual encounter was over 1 year ago. Follows at [facility].  - current CD4 178, VL 50,732  - per collateral, previous CD4 671,  (12/2021)  - c/w home Biktarvy  - HIV team consulted, appreciate recs    #Asymptomatic e. coli bacteriuria  Denies dysuria, urinary frequency/urgency. No suprapubic/CVA tenderness. Urine: small LE, neg nitrites. WBC>10. Ucx shows e. coli >100k.  - on abx for PNA, as above      PROPHYLAXIS  Fluids: None  Electrolytes: replete as necessary, K>4, Mg>2  Nutrition: DASH  Bowel Regimen: None  DVT ppx: Heparin gtt  GI ppx: Pantoprazole 40mg IV   Code: Full  Disposition: CCU 41M PMH CAD, MI (Jan 2022, stent), Left heart thrombus (Xarelto) CHF (unknown EF), colon cancer (PAWAN, s/p resection/chemo 2016), HIV (Biktarvy), former smoker (<1 pack year) BIBEMS for worsening shortness of breath for two days with nonproductive cough, admitted to CCU for acute hypoxic respiratory failure 2/2 PNA vs acute heart failure exacerbation.    NEURO  No active issues, AAOX3, neuro intact    CVS  #Acute Decompensated Heart Failure  Patient with HFrEF (EF 10-15%) likely due to ischemic cardiomypathy. Primary cardiologist is at [facility] (Dr. Saniya Ramirez and Dr. Marlo Kapoor). Home meds: Entresto, torsemide, Toprol XL.  On admission, signs and symptoms of exacerbation diffuse pulmonary edema on XR, volume overloaded on exam with edema, S3, ProBNP 34,267. S/p lasix 80mg and NG drip in ED, currently being monitored off diuretics.  Patient euvolemic to dry on exam, normal output state. TTE (4/4) shows EF 10-15%, no LV thrombus, dilated LV, severely dilated LA, mild/mod TR/MR.  - holding Lasix gtt I/S/O hypotension  - introduce GDMT as appropriate:        - Continue Lopressor 12.5mg PO BID        - holding Entresto, restart when BP stable        - holding torsemide, restart when BP stable  - Right heart cath today  - strict I&Os    #CAD  #History of MI s/p PCI  MI in Jan 2022 at [facility], stent placed to [facility], stent re-stenosed. Home medications Plavix & Xarelto, atorvastatin 80mg. Primary cardiologist is at [facility] (Dr. Saniya Ramirez and Dr. Marlo Kapoor). Lipid panel unremarkable.  - discontinued home Xarelto 15mg (no longer with LV thrombus, s/p 3mo tx)  - Continue ASA 81mg daily starting tomorrow  - Continue Plavix 75mg daily  - Continue atorvastatin 80mg daily    #H/o LV thrombus  After initial MI in 1/2022, small LV thrombus. Last seen on 1/6. No longer seen on subsequent studies (2/2022). Thrombus not seen on TTE on current admission. Now s/p 3 months of Xarelto.  - FRANKIE    PULM  #Acute Hypoxic Respiratory Failure likely 2/2 CHF exacerbation vs PNA  Patient presented with worsening SOB x2d, found to be in AHRF requiring BIPAP on admission, etiology likely bacterial/PCP pneumonia with some contribution from CHF exacerbation. On admission, febrile initially, elevated LDH, d-dimer, LDH, procal, ESR, CRP. Currently, patient clinically euvolemic, has been afebrile since admission, being treated with Bactrim and prednisone for PCP pneumonia.  AHRF improving overall. CXR with improving bilateral hilar congestion and diffuse bilateral opacities.  CT chest with diffuse severe patchy groundglass opacity throughout the upper and lower lobes with subpleural sparing. Negative COVID, MRSA nares, Ur legionella/strep. s/p CTX in ED and vanc x1 dose  - Wean O2 as tolerated, currently on 6L  - holding Lasix gtt, as pt seems euvolemic  - pulmonlogy consulted, appreciate recs        - F/u sputum for culture & PCP        - holding off on bronchoscopy at this time  - ID consulted, appreciate recs        - CD4 174 on current admission        - START bactrim 500mg IV q8h for empiric PCP tx        - START prednisone 40mg PO BID        - c/w zosyn 3.375g q6h for possible bacterial PNA        - f/u fungitell, TB quant    #Pneumonia (bacterial vs. PCP)  S/p CTX, decadron 10mg. BIPAP in ED with improvement in SpO2.  COVID, MRSA nares, Ur legionella/strep neg.  Elevated Procal 5.27,   - AHRF plan, as above  - ID & pulmonology following, as above    GI  #Transaminitis  May be i/s/o acute HF exacerbation with poor perfusion transaminitis lactate) or hepatitis (i/s/o immunocompromise from HIV). Denied EtOH. Hepatitis panel wnl.  - trend CMP  - f/u remaining hepatitis labs (HBV cAb total/sAb/sAg, HCV PCR)    #Colon Cancer s/p Resection  Resection in 2016. Colostomy bag in place. Follows with Dr. Guilherme Travis with [facility]. No active disease.  - outpatient follow-up    RENAL  #KULWINDER vs KULWINDER on CKD  Elevated Cr. Unknown baseline. Likely pre-renal as also with lactate, transaminitis in acute heart failure exacerbation. FeUrea pre-renal.  - continue to trend creatinine  - avoid nephrotoxic medications    HEME  #Leukocytosis  WBC 13.87 on admission and febrile to 102.5. Procal 5.2. Being treated for possible CAP vs. PCP PNA. Previously on decadron earlier in course.  - on prednisone for empiric PCP tx  - abx plan, as above  - continue to trend    ENDO  - A1c 5.2, no PMH of diabetes  - low ISS  - on prednisone for empiric PCP tx  - FSG before meals and at bedtime    ID  #Severe sepsis  Presented with lactate 3.8, tachypnea, leukocytosis with neutrophilic predominance and fever.   Likely due to PNA vs acute decompensated HF vs AHRF due to COVID. Less likely from UTI as denies urinary symptoms. Lactate now cleared.  s/p CTX 1gr in ED. s/p vanc x1. RVP, COVID, MRSA nares negative.  - c/w zosyn 3.375g q6h  - START bactrim for empiric PCP tx, as above  - f/u blood culture, UCx  - will send sputum cx, PCP sputum    #HIV  Febrile on admission. PMH HIV (dx 2010). States compliance on Biktarvy.   Per pt, last sexual encounter was over 1 year ago. Follows at [facility].  - current CD4 178, VL 50,732  - per collateral, previous CD4 671,  (12/2021)  - c/w home Biktarvy  - HIV team consulted, appreciate recs    #Asymptomatic e. coli bacteriuria  Denies dysuria, urinary frequency/urgency. No suprapubic/CVA tenderness. Urine: small LE, neg nitrites. WBC>10. Ucx shows e. coli >100k.  - on abx for PNA, as above      PROPHYLAXIS  Fluids: None  Electrolytes: replete as necessary, K>4, Mg>2  Nutrition: DASH  Bowel Regimen: None  DVT ppx: Heparin gtt  GI ppx: Pantoprazole 40mg IV   Code: Full  Disposition: CCU 41M PMH CAD, MI (Jan 2022, stent), Left heart thrombus (Xarelto) CHF (unknown EF), colon cancer (PAWAN, s/p resection/chemo 2016), HIV (Biktarvy), former smoker (<1 pack year) BIBEMS for worsening shortness of breath for two days with nonproductive cough, admitted to CCU for acute hypoxic respiratory failure 2/2 PNA vs acute heart failure exacerbation.    NEURO  No active issues, AAOX3, neuro intact    CVS  #Acute Decompensated Heart Failure  Patient with HFrEF (EF 10-15%) likely due to ischemic cardiomypathy. Primary cardiologist is at [facility] (Dr. Saniya Ramirez and Dr. Marlo Kapoor). Home meds: Entresto, torsemide, Toprol XL.  On admission, signs and symptoms of exacerbation diffuse pulmonary edema on XR, volume overloaded on exam with edema, S3, ProBNP 34,267. S/p lasix 80mg and NG drip in ED, currently being monitored off diuretics.  TTE (4/4) shows EF 10-15%, no LV thrombus, dilated LV, severely dilated LA, mild/mod TR/MR.  Today, patient euvolemic to dry on exam, normal output state.   - holding Lasix gtt I/S/O hypotension  - introduce GDMT as appropriate:        - Continue Lopressor 12.5mg PO BID        - holding Entresto, restart when BP stable        - holding torsemide, restart when BP stable  - Right heart cath today  - strict I&Os    #CAD  #History of MI s/p PCI  MI in Jan 2022 at [facility], stent placed to [facility], stent re-stenosed. Home medications Plavix & Xarelto, atorvastatin 80mg. Primary cardiologist is at [facility] (Dr. Saniya Ramirez and Dr. Marlo Kapoor). Lipid panel unremarkable.  - discontinued home Xarelto 15mg (no longer with LV thrombus, s/p 3mo tx)  - Continue ASA 81mg daily starting tomorrow  - Continue Plavix 75mg daily  - Continue atorvastatin 80mg daily    #H/o LV thrombus  After initial MI in 1/2022, small LV thrombus. Last seen on 1/6. No longer seen on subsequent studies (2/2022). Thrombus not seen on TTE on current admission. Now s/p 3 months of Xarelto.  - FRANKIE    PULM  #Acute Hypoxic Respiratory Failure likely 2/2 CHF exacerbation vs PNA  Patient presented with worsening SOB x2d, found to be in AHRF requiring BIPAP on admission, etiology likely bacterial/PCP pneumonia with some contribution from CHF exacerbation. On admission, febrile initially, elevated LDH, d-dimer, LDH, procal, ESR, CRP. Currently, patient clinically euvolemic, has been afebrile since admission, being treated with Bactrim and prednisone for PCP pneumonia.  AHRF improving overall. CXR with improving bilateral hilar congestion and diffuse bilateral opacities.  CT chest with diffuse severe patchy groundglass opacity throughout the upper and lower lobes with subpleural sparing. Negative COVID, MRSA nares, Ur legionella/strep. s/p CTX in ED and vanc x1 dose  - Wean O2 as tolerated, currently on 6L  - holding Lasix gtt, as pt seems euvolemic  - pulmonlogy consulted, appreciate recs        - F/u sputum for culture & PCP        - holding off on bronchoscopy at this time  - ID consulted, appreciate recs        - CD4 174 on current admission        - Continue bactrim 500mg IV q8h for empiric PCP tx        - Continue prednisone 40mg PO BID        - Continue zosyn 3.375g q6h for possible bacterial PNA        - f/u fungitell, TB quant    #Pneumonia (bacterial vs. PCP)  S/p CTX, decadron 10mg. BIPAP in ED with improvement in SpO2. COVID, MRSA nares, Ur legionella/strep neg. Elevated Procal 5.27,   - See plan above     GI  #Transaminitis  May be i/s/o acute HF exacerbation with poor perfusion transaminitis lactate) or hepatitis (i/s/o immunocompromise from HIV). Denied EtOH. Hepatitis panel wnl.  - trend CMP  - f/u remaining hepatitis labs (HBV cAb total/sAb/sAg, HCV PCR)    #Colon Cancer s/p Resection  Resection in 2016. Colostomy bag in place. Follows with Dr. Guilherme Travis with [facility]. No active disease.  - outpatient follow-up    RENAL  #KULWINDER vs KULWINDER on CKD  Scr 1.97 admission --> 1.66 4/6 (Unknown baseline), Likely pre-renal as also with lactate, transaminitis in acute heart failure exacerbation. FeUrea pre-renal.  - avoid nephrotoxic medications    HEME  #Leukocytosis  WBC 13.87 , peaked at 16, now improved to 12. Procal 5.2. Leukocytosis likely due to CAP and more likely  PCP PNA.   - Continue Prednisone for empiric PCP tx  - Antibiotic plan, as above    ENDO  A1c 5.2, no PMH of diabetes, on prednisone for empiric PCP tx  - low ISS  - FSG before meals and at bedtime    ID  #Severe sepsis  Presented with lactate 3.8, tachypnea, leukocytosis with neutrophilic predominance and fever.   Likely due to PNA vs acute decompensated HF vs AHRF due to COVID. Less likely from UTI as denies urinary symptoms. Lactate now cleared.  s/p CTX 1gr in ED. s/p vanc x1. RVP, COVID, MRSA nares negative.  - c/w zosyn 3.375g q6h  - START bactrim for empiric PCP tx, as above  - f/u blood culture, UCx  - will send sputum cx, PCP sputum    #HIV  Febrile on admission. PMH HIV (dx 2010). States compliance on Biktarvy.   Per pt, last sexual encounter was over 1 year ago. Follows at [facility].  - current CD4 178, VL 50,732  - per collateral, previous CD4 671,  (12/2021)  - c/w home Biktarvy  - HIV team consulted, appreciate recs    #Asymptomatic e. coli bacteriuria  Denies dysuria, urinary frequency/urgency. No suprapubic/CVA tenderness. Urine: small LE, neg nitrites. WBC>10. Ucx shows e. coli >100k.  - on abx for PNA, as above      PROPHYLAXIS  Fluids: None  Electrolytes: replete as necessary, K>4, Mg>2  Nutrition: DASH  Bowel Regimen: None  DVT ppx: Heparin gtt  GI ppx: Pantoprazole 40mg IV   Code: Full  Disposition: CCU 41M PMH CAD, MI (Jan 2022, stent), Left heart thrombus (Xarelto) CHF (unknown EF), colon cancer (PAWAN, s/p resection/chemo 2016), HIV (Biktarvy), former smoker (<1 pack year) BIBEMS for worsening shortness of breath for two days with nonproductive cough, admitted to CCU for acute hypoxic respiratory failure 2/2 PNA vs acute heart failure exacerbation.    NEURO  No active issues, AAOX3, neuro intact    CVS  #Acute Decompensated Heart Failure  Patient with HFrEF (EF 10-15%) likely due to ischemic cardiomypathy. Primary cardiologist is at [facility] (Dr. Saniya Ramirez and Dr. Marlo Kapoor). Home meds: Entresto, torsemide, Toprol XL.  On admission, signs and symptoms of exacerbation diffuse pulmonary edema on XR, volume overloaded on exam with edema, S3, ProBNP 34,267. S/p lasix 80mg and NG drip in ED, currently being monitored off diuretics.  TTE (4/4) shows EF 10-15%, no LV thrombus, dilated LV, severely dilated LA, mild/mod TR/MR.  Today, patient euvolemic to dry on exam, normal output state.   - holding Lasix gtt I/S/O hypotension  - introduce GDMT as appropriate:        - Continue Lopressor 12.5mg PO BID        - holding Entresto, restart when BP stable        - holding torsemide, restart when BP stable  - Right heart cath today  - strict I&Os    #CAD  #History of MI s/p PCI  MI in Jan 2022 at [facility], stent placed to [facility], stent re-stenosed. Home medications Plavix & Xarelto, atorvastatin 80mg. Primary cardiologist is at [facility] (Dr. Saniya Ramirez and Dr. Marlo Kapoor). Lipid panel unremarkable.  - discontinued home Xarelto 15mg (no longer with LV thrombus, s/p 3mo tx)  - Continue ASA 81mg daily starting tomorrow  - Continue Plavix 75mg daily  - Continue atorvastatin 80mg daily    #H/o LV thrombus  After initial MI in 1/2022, small LV thrombus. Last seen on 1/6. No longer seen on subsequent studies (2/2022). Thrombus not seen on TTE on current admission. Now s/p 3 months of Xarelto.  - FRANKIE    PULM  #Acute Hypoxic Respiratory Failure likely 2/2 CHF exacerbation vs PNA  Patient presented with worsening SOB x2d, found to be in AHRF requiring BIPAP on admission, etiology likely bacterial/PCP pneumonia with some contribution from CHF exacerbation. On admission, febrile initially, elevated LDH, d-dimer, LDH, procal, ESR, CRP. Currently, patient clinically euvolemic, has been afebrile since admission, being treated with Bactrim and prednisone for PCP pneumonia.  AHRF improving overall. CXR with improving bilateral hilar congestion and diffuse bilateral opacities.  CT chest with diffuse severe patchy groundglass opacity throughout the upper and lower lobes with subpleural sparing. Negative COVID, MRSA nares, Ur legionella/strep. s/p CTX in ED and vanc x1 dose  - Wean O2 as tolerated, currently on 6L  - holding Lasix gtt, as pt seems euvolemic  - pulmonlogy consulted, appreciate recs        - F/u sputum for culture & PCP        - holding off on bronchoscopy at this time  - ID consulted, appreciate recs        - CD4 174 on current admission        - Continue bactrim 500mg IV q8h for empiric PCP tx        - Continue prednisone 40mg PO BID        - Continue zosyn 3.375g q6h for possible bacterial PNA        - f/u fungitell, TB quant    #Pneumonia (bacterial vs. PCP)  S/p CTX, decadron 10mg. BIPAP in ED with improvement in SpO2. COVID, MRSA nares, Ur legionella/strep neg. Elevated Procal 5.27,   - See plan above     GI  #Transaminitis  May be i/s/o acute HF exacerbation with poor perfusion transaminitis lactate) or hepatitis (i/s/o immunocompromise from HIV). Denied EtOH. Hepatitis panel wnl.  - trend CMP  - f/u remaining hepatitis labs (HBV cAb total/sAb/sAg, HCV PCR)    #Colon Cancer s/p Resection  Resection in 2016. Colostomy bag in place. Follows with Dr. Guilherme Travis with [facility]. No active disease.  - outpatient follow-up    RENAL  #KULWINDER vs KULWINDER on CKD  Scr 1.97 admission --> 1.66 4/6 (Unknown baseline), Likely pre-renal as also with lactate, transaminitis in acute heart failure exacerbation. FeUrea pre-renal.  - avoid nephrotoxic medications    HEME  #Leukocytosis  WBC 13.87 , peaked at 16, now improved to 12. Procal 5.2. Leukocytosis likely due to CAP and more likely  PCP PNA.   - Continue Prednisone for empiric PCP tx  - Antibiotic plan, as above    ENDO  A1c 5.2, no PMH of diabetes, on prednisone for empiric PCP tx. -140, hasn't needed SS insulin.  - low ISS  - FSG before meals and at bedtime    ID  #Severe sepsis (RESOLVED)  Patient presented with lactate 3.8, tachypnea, leukocytosis with neutrophilic predominance and fever. Likely due to PNA, unlikely from UTI as denies urinary symptoms. Lactate now cleared. s/p CTX 1gr in ED. s/p vanc x1. RVP, COVID, MRSA nares negative. UCx with e.coli. Bcx NGTD  - Continue zosyn 3.375g q6h for 7-day course per ID (4/4-4/10)  - Continue bactrim for empiric PCP tx, as above  - F/u sputum cx, PCP sputum    #HIV  Febrile on admission. PMH HIV (dx 2010). States compliance on Biktarvy.   Per pt, last sexual encounter was over 1 year ago. Follows at [facility].  - current CD4 178, VL 50,732  - per collateral, previous CD4 671,  (12/2021)  - c/w home Biktarvy  - HIV team consulted, appreciate recs    #Asymptomatic e. coli bacteriuria  Denies dysuria, urinary frequency/urgency. No suprapubic/CVA tenderness. Urine: small LE, neg nitrites. WBC>10. Ucx shows e. coli >100k.  - on abx for PNA, as above    PROPHYLAXIS  Fluids: None  Electrolytes: replete as necessary, K>4, Mg>2  Nutrition: DASH  Bowel Regimen: None  DVT ppx: Heparin gtt  GI ppx: Pantoprazole 40mg IV   Code: Full  Disposition: CCU   41M PMH CAD, MI (Jan 2022, stent thrombosed), Left heart thrombus (Xarelto) CHF (unknown EF), colon cancer (PAWAN, s/p resection/chemo 2016), HIV (Biktarvy), former smoker (<1 pack year) BIBEMS for worsening shortness of breath for two days with nonproductive cough, admitted to CCU for acute hypoxic respiratory failure 2/2 PNA vs acute heart failure exacerbation, now clinically euvolemic, being treated for bacterial and PCP pneumonia, pending RHC to guide medical management of CHF.    NEURO  No active issues, AAOX3, neuro intact    CVS  #Acute Decompensated Heart Failure  Patient with HFrEF (EF 10-15%) likely due to ischemic cardiomypathy. Primary cardiologist is at [facility] (Dr. Saniya Ramirez and Dr. Marlo Kapoor). Home meds: Entresto, torsemide, Toprol XL.  On admission, signs and symptoms of exacerbation diffuse pulmonary edema on XR, volume overloaded on exam with edema, S3, ProBNP 34,267. S/p lasix 80mg and NG drip in ED, currently being monitored off diuretics.  TTE (4/4) shows EF 10-15%, no LV thrombus, dilated LV, severely dilated LA, mild/mod TR/MR.  Today, patient euvolemic to dry on exam, normal output state.   - holding Lasix gtt I/S/O hypotension  - introduce GDMT as appropriate:        - Continue Lopressor 12.5mg PO BID        - holding Entresto, restart when BP stable        - holding torsemide, restart when BP stable  - Right heart cath today  - strict I&Os    #CAD  #History of MI s/p PCI  MI in Jan 2022 at [facility], stent placed to pLAD, stent re-stenosed. Home medications Plavix & Xarelto, atorvastatin 80mg. Primary cardiologist is at [facility] (Dr. Saniya Ramirez and Dr. Marlo Kapoor). Lipid panel unremarkable.  - discontinued home Xarelto 15mg (no longer with LV thrombus, s/p 3mo tx)  - Continue ASA 81mg daily starting tomorrow  - Continue Plavix 75mg daily  - Continue atorvastatin 80mg daily    #H/o LV thrombus  After initial MI in 1/2022, small LV thrombus. Last seen on 1/6. No longer seen on subsequent studies (2/2022). Thrombus not seen on TTE on current admission. Now s/p 3 months of Xarelto.  - FRANKIE    PULM  #Acute Hypoxic Respiratory Failure likely 2/2 CHF exacerbation vs PNA  Patient presented with worsening SOB x2d, found to be in AHRF requiring BIPAP on admission, etiology likely bacterial/PCP pneumonia with some contribution from CHF exacerbation. On admission, febrile initially, elevated LDH, d-dimer, LDH, procal, ESR, CRP. Currently, patient clinically euvolemic, has been afebrile since admission, being treated with Bactrim and prednisone for PCP pneumonia.  AHRF improving overall. CXR with improving bilateral hilar congestion and diffuse bilateral opacities.  CT chest with diffuse severe patchy groundglass opacity throughout the upper and lower lobes with subpleural sparing. Negative COVID, MRSA nares, Ur legionella/strep. s/p CTX in ED and vanc x1 dose  - Wean O2 as tolerated, currently on 6L  - holding Lasix gtt, as pt seems euvolemic  - pulmonlogy consulted, appreciate recs        - F/u sputum for culture & PCP        - holding off on bronchoscopy at this time  - ID consulted, appreciate recs        - CD4 174 on current admission        - Continue bactrim 500mg IV q8h for empiric PCP tx        - Continue prednisone 40mg PO BID        - Continue zosyn 3.375g q6h for possible bacterial PNA        - f/u fungitell, TB quant    #Pneumonia (bacterial vs. PCP)  S/p CTX, decadron 10mg. BIPAP in ED with improvement in SpO2. COVID, MRSA nares, Ur legionella/strep neg. Elevated Procal 5.27,   - See plan above     GI  #Transaminitis  May be i/s/o acute HF exacerbation with poor perfusion transaminitis lactate) or hepatitis (i/s/o immunocompromise from HIV). Denied EtOH. Hepatitis panel wnl.  - trend CMP  - f/u remaining hepatitis labs (HBV cAb total/sAb/sAg, HCV PCR)    #Colon Cancer s/p Resection  Resection in 2016. Colostomy bag in place. Follows with Dr. Guilherme Travis with [facility]. No active disease.  - outpatient follow-up    RENAL  #KULWINDER vs KULWINDER on CKD  Scr 1.97 admission --> 1.66 4/6 (Unknown baseline), Likely pre-renal as also with lactate, transaminitis in acute heart failure exacerbation. FeUrea pre-renal.  - avoid nephrotoxic medications    HEME  #Leukocytosis  WBC 13.87 , peaked at 16, now improved to 12. Procal 5.2. Leukocytosis likely due to CAP and more likely  PCP PNA.   - Continue Prednisone for empiric PCP tx  - Antibiotic plan, as above    ENDO  A1c 5.2, no PMH of diabetes, on prednisone for empiric PCP tx. -140, hasn't needed SS insulin.  - low ISS  - FSG before meals and at bedtime    ID  #Severe sepsis (RESOLVED)  Patient presented with lactate 3.8, tachypnea, leukocytosis with neutrophilic predominance and fever. Likely due to PNA, unlikely from UTI as denies urinary symptoms. Lactate now cleared. s/p CTX 1gr in ED. s/p vanc x1. RVP, COVID, MRSA nares negative. UCx with e.coli. Bcx NGTD  - Continue zosyn 3.375g q6h for 7-day course per ID (4/4-4/10)  - Continue bactrim for empiric PCP tx, as above  - F/u sputum cx, PCP sputum    #HIV  Febrile on admission. PMH HIV (dx 2010). States compliance on Biktarvy.   Per pt, last sexual encounter was over 1 year ago. Follows at [facility].  - current CD4 178, VL 50,732  - per collateral, previous CD4 671,  (12/2021)  - c/w home Biktarvy  - HIV team consulted, appreciate recs    #Asymptomatic e. coli bacteriuria  Denies dysuria, urinary frequency/urgency. No suprapubic/CVA tenderness. Urine: small LE, neg nitrites. WBC>10. Ucx shows e. coli >100k.  - on abx for PNA, as above    PROPHYLAXIS  Fluids: None  Electrolytes: replete as necessary, K>4, Mg>2  Nutrition: DASH  Bowel Regimen: None  DVT ppx: Heparin gtt  GI ppx: Pantoprazole 40mg IV   Code: Full  Disposition: CCU

## 2022-04-07 DIAGNOSIS — J18.9 PNEUMONIA, UNSPECIFIED ORGANISM: ICD-10-CM

## 2022-04-07 DIAGNOSIS — B19.10 UNSPECIFIED VIRAL HEPATITIS B WITHOUT HEPATIC COMA: ICD-10-CM

## 2022-04-07 DIAGNOSIS — N17.9 ACUTE KIDNEY FAILURE, UNSPECIFIED: ICD-10-CM

## 2022-04-07 DIAGNOSIS — I25.10 ATHEROSCLEROTIC HEART DISEASE OF NATIVE CORONARY ARTERY WITHOUT ANGINA PECTORIS: ICD-10-CM

## 2022-04-07 DIAGNOSIS — B20 HUMAN IMMUNODEFICIENCY VIRUS [HIV] DISEASE: ICD-10-CM

## 2022-04-07 DIAGNOSIS — I50.9 HEART FAILURE, UNSPECIFIED: ICD-10-CM

## 2022-04-07 DIAGNOSIS — Z00.00 ENCOUNTER FOR GENERAL ADULT MEDICAL EXAMINATION WITHOUT ABNORMAL FINDINGS: ICD-10-CM

## 2022-04-07 LAB
ALBUMIN SERPL ELPH-MCNC: 2.7 G/DL — LOW (ref 3.3–5)
ALP SERPL-CCNC: 53 U/L — SIGNIFICANT CHANGE UP (ref 40–120)
ALT FLD-CCNC: 97 U/L — HIGH (ref 10–45)
ANION GAP SERPL CALC-SCNC: 10 MMOL/L — SIGNIFICANT CHANGE UP (ref 5–17)
AST SERPL-CCNC: 48 U/L — HIGH (ref 10–40)
BASOPHILS # BLD AUTO: 0.01 K/UL — SIGNIFICANT CHANGE UP (ref 0–0.2)
BASOPHILS NFR BLD AUTO: 0.1 % — SIGNIFICANT CHANGE UP (ref 0–2)
BILIRUB SERPL-MCNC: 0.3 MG/DL — SIGNIFICANT CHANGE UP (ref 0.2–1.2)
BUN SERPL-MCNC: 25 MG/DL — HIGH (ref 7–23)
CALCIUM SERPL-MCNC: 8 MG/DL — LOW (ref 8.4–10.5)
CHLORIDE SERPL-SCNC: 101 MMOL/L — SIGNIFICANT CHANGE UP (ref 96–108)
CO2 SERPL-SCNC: 24 MMOL/L — SIGNIFICANT CHANGE UP (ref 22–31)
CREAT SERPL-MCNC: 1.56 MG/DL — HIGH (ref 0.5–1.3)
EGFR: 56 ML/MIN/1.73M2 — LOW
EOSINOPHIL # BLD AUTO: 0 K/UL — SIGNIFICANT CHANGE UP (ref 0–0.5)
EOSINOPHIL NFR BLD AUTO: 0 % — SIGNIFICANT CHANGE UP (ref 0–6)
FERRITIN SERPL-MCNC: 365 NG/ML — SIGNIFICANT CHANGE UP (ref 30–400)
FUNGITELL: SIGNIFICANT CHANGE UP PG/ML
GLUCOSE BLDC GLUCOMTR-MCNC: 122 MG/DL — HIGH (ref 70–99)
GLUCOSE BLDC GLUCOMTR-MCNC: 126 MG/DL — HIGH (ref 70–99)
GLUCOSE BLDC GLUCOMTR-MCNC: 144 MG/DL — HIGH (ref 70–99)
GLUCOSE BLDC GLUCOMTR-MCNC: 154 MG/DL — HIGH (ref 70–99)
GLUCOSE SERPL-MCNC: 126 MG/DL — HIGH (ref 70–99)
HBV DNA # SERPL NAA+PROBE: DETECTED
HBV DNA # SERPL NAA+PROBE: SIGNIFICANT CHANGE UP IU/ML
HBV DNA SERPL NAA+PROBE-LOG#: 6.37 LOGIU/ML — SIGNIFICANT CHANGE UP
HBV E AB SER-ACNC: SIGNIFICANT CHANGE UP
HBV E AG SER-ACNC: REACTIVE
HCT VFR BLD CALC: 29.3 % — LOW (ref 39–50)
HGB BLD-MCNC: 10.4 G/DL — LOW (ref 13–17)
IMM GRANULOCYTES NFR BLD AUTO: 0.7 % — SIGNIFICANT CHANGE UP (ref 0–1.5)
IRON SATN MFR SERPL: 20 % — SIGNIFICANT CHANGE UP (ref 16–55)
IRON SATN MFR SERPL: 46 UG/DL — SIGNIFICANT CHANGE UP (ref 45–165)
LYMPHOCYTES # BLD AUTO: 1.17 K/UL — SIGNIFICANT CHANGE UP (ref 1–3.3)
LYMPHOCYTES # BLD AUTO: 11.6 % — LOW (ref 13–44)
MAGNESIUM SERPL-MCNC: 2.4 MG/DL — SIGNIFICANT CHANGE UP (ref 1.6–2.6)
MCHC RBC-ENTMCNC: 28.9 PG — SIGNIFICANT CHANGE UP (ref 27–34)
MCHC RBC-ENTMCNC: 35.5 GM/DL — SIGNIFICANT CHANGE UP (ref 32–36)
MCV RBC AUTO: 81.4 FL — SIGNIFICANT CHANGE UP (ref 80–100)
MONOCYTES # BLD AUTO: 0.7 K/UL — SIGNIFICANT CHANGE UP (ref 0–0.9)
MONOCYTES NFR BLD AUTO: 7 % — SIGNIFICANT CHANGE UP (ref 2–14)
NEUTROPHILS # BLD AUTO: 8.12 K/UL — HIGH (ref 1.8–7.4)
NEUTROPHILS NFR BLD AUTO: 80.6 % — HIGH (ref 43–77)
NRBC # BLD: 0 /100 WBCS — SIGNIFICANT CHANGE UP (ref 0–0)
P JIROVECII DNA L RESP QL NAA+NON-PROBE: NEGATIVE — SIGNIFICANT CHANGE UP
PHOSPHATE SERPL-MCNC: 3.9 MG/DL — SIGNIFICANT CHANGE UP (ref 2.5–4.5)
PLATELET # BLD AUTO: 350 K/UL — SIGNIFICANT CHANGE UP (ref 150–400)
POTASSIUM SERPL-MCNC: 4.3 MMOL/L — SIGNIFICANT CHANGE UP (ref 3.5–5.3)
POTASSIUM SERPL-SCNC: 4.3 MMOL/L — SIGNIFICANT CHANGE UP (ref 3.5–5.3)
PROT SERPL-MCNC: 6.2 G/DL — SIGNIFICANT CHANGE UP (ref 6–8.3)
RBC # BLD: 3.6 M/UL — LOW (ref 4.2–5.8)
RBC # FLD: 15.9 % — HIGH (ref 10.3–14.5)
RPR SER-TITR: SIGNIFICANT CHANGE UP
RPR SERPL-ACNC: SIGNIFICANT CHANGE UP
SODIUM SERPL-SCNC: 135 MMOL/L — SIGNIFICANT CHANGE UP (ref 135–145)
SPECIMEN SOURCE: SIGNIFICANT CHANGE UP
T PALLIDUM AB TITR SER: POSITIVE
T PALLIDUM IGG SER QL IF: REACTIVE
TIBC SERPL-MCNC: 234 UG/DL — SIGNIFICANT CHANGE UP (ref 220–430)
TRANSFERRIN SERPL-MCNC: 202 MG/DL — SIGNIFICANT CHANGE UP (ref 200–360)
UIBC SERPL-MCNC: 188 UG/DL — SIGNIFICANT CHANGE UP (ref 110–370)
WBC # BLD: 10.07 K/UL — SIGNIFICANT CHANGE UP (ref 3.8–10.5)
WBC # FLD AUTO: 10.07 K/UL — SIGNIFICANT CHANGE UP (ref 3.8–10.5)

## 2022-04-07 PROCEDURE — 99223 1ST HOSP IP/OBS HIGH 75: CPT | Mod: 25,GC

## 2022-04-07 PROCEDURE — 71045 X-RAY EXAM CHEST 1 VIEW: CPT | Mod: 26

## 2022-04-07 PROCEDURE — 99232 SBSQ HOSP IP/OBS MODERATE 35: CPT

## 2022-04-07 PROCEDURE — 99291 CRITICAL CARE FIRST HOUR: CPT

## 2022-04-07 PROCEDURE — 99232 SBSQ HOSP IP/OBS MODERATE 35: CPT | Mod: GC

## 2022-04-07 RX ORDER — HEPARIN SODIUM 5000 [USP'U]/ML
5000 INJECTION INTRAVENOUS; SUBCUTANEOUS EVERY 8 HOURS
Refills: 0 | Status: DISCONTINUED | OUTPATIENT
Start: 2022-04-07 | End: 2022-04-08

## 2022-04-07 RX ORDER — ATORVASTATIN CALCIUM 80 MG/1
20 TABLET, FILM COATED ORAL AT BEDTIME
Refills: 0 | Status: DISCONTINUED | OUTPATIENT
Start: 2022-04-07 | End: 2022-04-08

## 2022-04-07 RX ORDER — METOPROLOL TARTRATE 50 MG
12.5 TABLET ORAL EVERY 12 HOURS
Refills: 0 | Status: COMPLETED | OUTPATIENT
Start: 2022-04-07 | End: 2022-04-07

## 2022-04-07 RX ORDER — FUROSEMIDE 40 MG
40 TABLET ORAL ONCE
Refills: 0 | Status: COMPLETED | OUTPATIENT
Start: 2022-04-07 | End: 2022-04-07

## 2022-04-07 RX ORDER — METOPROLOL TARTRATE 50 MG
25 TABLET ORAL EVERY 24 HOURS
Refills: 0 | Status: DISCONTINUED | OUTPATIENT
Start: 2022-04-08 | End: 2022-04-08

## 2022-04-07 RX ORDER — VALSARTAN 80 MG/1
20 TABLET ORAL EVERY 12 HOURS
Refills: 0 | Status: DISCONTINUED | OUTPATIENT
Start: 2022-04-08 | End: 2022-04-08

## 2022-04-07 RX ORDER — CEFTRIAXONE 500 MG/1
2000 INJECTION, POWDER, FOR SOLUTION INTRAMUSCULAR; INTRAVENOUS EVERY 24 HOURS
Refills: 0 | Status: DISCONTINUED | OUTPATIENT
Start: 2022-04-07 | End: 2022-04-08

## 2022-04-07 RX ADMIN — BICTEGRAVIR SODIUM, EMTRICITABINE, AND TENOFOVIR ALAFENAMIDE FUMARATE 1 TABLET(S): 30; 120; 15 TABLET ORAL at 11:26

## 2022-04-07 RX ADMIN — Medication 40 MILLIGRAM(S): at 00:50

## 2022-04-07 RX ADMIN — Medication 40 MILLIGRAM(S): at 06:32

## 2022-04-07 RX ADMIN — ATORVASTATIN CALCIUM 20 MILLIGRAM(S): 80 TABLET, FILM COATED ORAL at 21:42

## 2022-04-07 RX ADMIN — Medication 12.5 MILLIGRAM(S): at 06:32

## 2022-04-07 RX ADMIN — Medication 81 MILLIGRAM(S): at 11:26

## 2022-04-07 RX ADMIN — Medication 12.5 MILLIGRAM(S): at 17:07

## 2022-04-07 RX ADMIN — PIPERACILLIN AND TAZOBACTAM 3.33 GRAM(S): 4; .5 INJECTION, POWDER, LYOPHILIZED, FOR SOLUTION INTRAVENOUS at 09:40

## 2022-04-07 RX ADMIN — CLOPIDOGREL BISULFATE 75 MILLIGRAM(S): 75 TABLET, FILM COATED ORAL at 11:25

## 2022-04-07 RX ADMIN — CEFTRIAXONE 100 MILLIGRAM(S): 500 INJECTION, POWDER, FOR SOLUTION INTRAMUSCULAR; INTRAVENOUS at 14:34

## 2022-04-07 RX ADMIN — Medication 265.63 MILLIGRAM(S): at 02:21

## 2022-04-07 RX ADMIN — HEPARIN SODIUM 5000 UNIT(S): 5000 INJECTION INTRAVENOUS; SUBCUTANEOUS at 21:41

## 2022-04-07 RX ADMIN — HEPARIN SODIUM 5000 UNIT(S): 5000 INJECTION INTRAVENOUS; SUBCUTANEOUS at 14:34

## 2022-04-07 RX ADMIN — PIPERACILLIN AND TAZOBACTAM 3.33 GRAM(S): 4; .5 INJECTION, POWDER, LYOPHILIZED, FOR SOLUTION INTRAVENOUS at 04:07

## 2022-04-07 RX ADMIN — Medication 2: at 11:46

## 2022-04-07 RX ADMIN — PANTOPRAZOLE SODIUM 40 MILLIGRAM(S): 20 TABLET, DELAYED RELEASE ORAL at 06:32

## 2022-04-07 RX ADMIN — Medication 265.63 MILLIGRAM(S): at 10:33

## 2022-04-07 RX ADMIN — CHLORHEXIDINE GLUCONATE 1 APPLICATION(S): 213 SOLUTION TOPICAL at 07:26

## 2022-04-07 NOTE — PROGRESS NOTE ADULT - REASON FOR ADMISSION
acute hypoxic respiratory failure

## 2022-04-07 NOTE — PROGRESS NOTE ADULT - PROBLEM SELECTOR PLAN 3
Scr 1.97 admission --> 1.66 4/6 (Unknown baseline) --> 1.56 (4/7)  -likely cardiorenal as patient now improving after diuresis. FeUrea pre-renal.  - avoid nephrotoxic medications; continue to monitor

## 2022-04-07 NOTE — PROGRESS NOTE ADULT - PROBLEM SELECTOR PLAN 2
Patient with bacterial pneumonia, AHRF on admission, BIPAP in ED with improvement in SpO2, subsequently on HFNC, now weaned to 2LNC  -COVID, MRSA nares, Ur legionella/strep neg. Sputum PCP PCR negative.  Crypto antigen negative.  - Per ID, discontinued Zosyn on 4/7, continue Ceftriaxone 2g daily until discharge at which time can transition to Cefpodoxime for total 7 day course (4/4-4/10)  - Patient has been on [id number] mg IV q8h but since sputum PCP negative, will transition to prophylaxis bactrim DS 1 pill qd on 4/7  - Per recommendation from Pulm and ID, steroids were discontinued 4/7  - F/u sputum cx, fungitell, TB quant, serum galactomannan, histo Ag

## 2022-04-07 NOTE — DIETITIAN INITIAL EVALUATION ADULT - PERTINENT LABORATORY DATA
low HH   POCT 122 151 110  BUN/Cr 25/1.56  Glucose 126  AST SGOT 48  ALT SGPT 97  sodium potassium mg phosphorus WDL (sodium had been prior)  HDL 27  A1c 5.2%   ABS CD4 178

## 2022-04-07 NOTE — CONSULT NOTE ADULT - ASSESSMENT
41M PMH uncontrolled HIV, CKD, colon cancer s/p resection with colostomy, CAD s/p MI and stent in 1/2022 and in-stent-thrombosis during same admission that could not be revascularized, HFrEF with LV thrombus on xarelto presenting with fever and shortness of breath found to have PCP pneumonia with likely some component of HF exacerbation that improved with IV diuretics.     #chronic systolic HFrEF 10% with dialted LV, GIIIDD, well compensated, euvolemic  Etiology: likely mixed ischemic d/t LAD IST and transmural infarct in combination with uncontrolled HIV  GDMT - switch metoprolol tartrate to metoprolol succinate 25mg daily, currently BP borderline, hold off resuming entresto for now, may consider starting spironolactone tomorrow, SGLT2i as outpatient  Diuretics - start torsemide 20mg PO daily  Device: he is 3 months since his MI, however not yet optimized on GDMT. once optimized medically will obtain repeat echo and assess candidacy for ICD

## 2022-04-07 NOTE — PROGRESS NOTE ADULT - ASSESSMENT
This is a 41M with PMH CAD, MI (Jan 2022 s/p GARFIELD), left heart thrombus (on Xarelto), HFrEF, colon cancer (s/p resection 2016, and chemotherapy, no complications), HIV who presents for 2 days of shortness of breath. Patient admitted for hypoxemic respiratory failure in the setting of cardiogenic pulmonary edema requiring lasix gtt. Patient with underlying interstitial lung process and given fevers and low CD4, concern for PCP pneumonia, for which pulmonology has been consulted.     #Acute hypoxemic respiratory failure   #Cardiogenic pulmonary edema  #GGO on CT chest    Patient presenting as above with clear component of cardiogenic pulmonary edema requiring BIPAP and HFNC. He has had significant improvement in CXR as well as clinically and now on nasal cannula. POCUS still with diffuse B lines bilaterally, and L small consolidation of lung. No pleural effusions. CT chest with interlobular septal thickening and central ground glass opacities which can be consistent with fluid overload vs atypical opportunistic infections such as PCP or other fungal or bacterial infections. Nonetheless, the most common cause of pneumonia in immunocompromised patients remains bacterial with Strep pneumo or MSSA.  which is sensitive for PCP pneumonia but not specific. The most likely primary etiology of his shortness of breath remains pulmonary edema. RHC 4/6 with PCWP 21 supporting this diagnosis.    Recommend:  - On 6L NC, wean as tolerated. Spo2 goal 92% and above  - There is no bacterial pneumonia on CT or CXR. Would favor stopping antibiotics  - On empiric PCP treatment, would change to PO and discontinue steroids and treat as mild PCP for now. F/u sputum PCP PCR and if negative would stop treatment bactrim and change to prophylactic dose  - Crypto antigen negative  - f/u serum galactomannan, histo Ag, and Fungitell  - Management of heart failure per primary CCU team    Patient s/e/d with attending Dr. Dorsey.

## 2022-04-07 NOTE — PROGRESS NOTE ADULT - PROBLEM SELECTOR PLAN 4
[facility] HIV (dx 2010). States compliance on Biktarvy. Per pt, last sexual encounter was over 1 year ago. Follows at [facility].  - current CD4 178, VL 50,732  - per collateral, previous CD4 671,  (12/2021)  - c/w home Biktarvy PMH HIV (dx 2010). States compliance on Biktarvy. Per pt, last sexual encounter was over 1 year ago. Follows at [facility].  - current CD4 178, VL 50,732  - per collateral, previous CD4 671,  (12/2021)  - c/w home Biktarvy and Bactrim ppx  - f/u RPR    #HepB  Transaminitis on admission, overall improving, etiology likely chronic HepB (HepB antigen positive with Core IgG) with some contribution acute HF exacerbation with poor perfusion.  - trend CMP

## 2022-04-07 NOTE — PROGRESS NOTE ADULT - ASSESSMENT
41 M w/ PMH of CAD, MI (Jan 2022, s/p stent), left heart thrombus (on Xarelto, now resolved) CHF (EF 15-20%), colon cancer (s/p resection 2016 w/ colostomy), HIV (on Biktarvy), former smoker (1pack/week for 3 years) presenting with 2 days of worsening SOB, chest tightness, and non-productive cough. Pt was admitted for acute hypoxic respiratory failure requiring lasix diuresis. He was found to have CXR significant for severe diffuse b/l dense infiltrates in setting of CD4 178 and HIV VL 50k. ID consulted for concern for PCP pneumonia. Pt clinically improved following diuresis, increasing suspicion for HF exacerbation as primary etiology of SOB. CT chest c/w fluid overload vs. possible opportunistic infection. PCP PCR of sputum negative, making PCP pneumonia unlikely. Will complete course for empiric treatment of possible bacterial pneumonia.       SUMMARY:  - COVID neg, RVP neg  - HBC neg, HAV neg, HBV c/w chronic HBV infection  - HIV (CD4 178, VL 50,732)  - MRSA/MSSA neg  - Legionella urine antigen neg  - Cryptococcal serum Ag neg  - UA c/w asymptomatic bacteriuria, UCx growing E.coli  - 04/05 CT chest: ground glass opacities throughout b/l lung fields c/w PCP pneumonia vs. COVID vs. ARDS vs. opportunistic infection  - PCP PCR neg    RECOMMENDATIONS:  - Continue Biktarvy   - Discontinue zosyn 3.375g IV q6h  - Start ceftriaxone 2g IV qd - for empiric treatment of bacterial pneumonia, last dose 04/10 (for total course of 7 days)  - If discharged prior to 04/10 can d/c with cefpodoxime 200 mg PO q12h (dose until 04/10)  - Discontinue bactrim 500 mg IV q8h - start on bactrim DS 1 pill q12h for PCP prophylaxis  - Discontinue 21 day steroid taper   - F/u BCX 04/04  - F/u fungitell  - F/u induced sputum culture  - F/u Histoplasma Ag  - Please obtain records from [facility] at [address] [place] [address] for collateral on HIV treatment history    Medical Student Note  ID Team 1 signing off - please reconsult if needed  Note not finalized until attested by attending     41 M w/ PMH of CAD, MI (Jan 2022, s/p stent), left heart thrombus (on Xarelto, now resolved) CHF (EF 15-20%), colon cancer (s/p resection 2016 w/ colostomy), HIV (on Biktarvy), former smoker (1pack/week for 3 years) presenting with 2 days of worsening SOB, chest tightness, and non-productive cough. Pt was admitted for acute hypoxic respiratory failure requiring lasix diuresis. He was found to have CXR significant for severe diffuse b/l dense infiltrates in setting of CD4 178 and HIV VL 50k. ID consulted for concern for PCP pneumonia. Pt clinically improved following diuresis, increasing suspicion for HF exacerbation as primary etiology of SOB. CT chest c/w fluid overload vs. possible opportunistic infection. PCP PCR of sputum negative, making PCP pneumonia unlikely. Will complete course for empiric treatment of possible bacterial pneumonia.       SUMMARY:  - COVID neg, RVP neg  - HBC neg, HAV neg, HBV c/w chronic HBV infection  - HIV (CD4 178, VL 50,732)  - MRSA/MSSA neg  - Legionella urine antigen neg  - Cryptococcal serum Ag neg  - UA c/w asymptomatic bacteriuria, UCx growing E.coli  - 04/05 CT chest: ground glass opacities throughout b/l lung fields c/w PCP pneumonia vs. COVID vs. ARDS vs. opportunistic infection  - PCP PCR neg    RECOMMENDATIONS:  - Continue Biktarvy   - Discontinue zosyn 3.375g IV q6h  - Start ceftriaxone 2g IV qd - for empiric treatment of bacterial pneumonia, last dose 04/10 (for total course of 7 days)  - If discharged prior to 04/10 can d/c with cefpodoxime 200 mg PO q12h (dose until 04/10)  - Discontinue bactrim 500 mg IV q8h - start on bactrim DS 1 pill qd for PCP prophylaxis  - Discontinue 21 day steroid taper   - F/u BCX 04/04  - F/u fungitell  - F/u induced sputum culture  - F/u Histoplasma Ag  - Please obtain records from [facility] at [address] [place] [address] for collateral on HIV treatment history    Medical Student Note  ID Team 1 signing off - please reconsult if needed  Note not finalized until attested by attending

## 2022-04-07 NOTE — DIETITIAN INITIAL EVALUATION ADULT - OTHER CALCULATIONS
pounds+-10%; %IWA=612  IBW used to calculate energy needs due to pt's current body weight exceeding 120% of IBW  Adjusted for age, CHF, HIV, Renal; Fluids per team

## 2022-04-07 NOTE — PROGRESS NOTE ADULT - SUBJECTIVE AND OBJECTIVE BOX
Transfer from CCU to Cardiac Tele    Hospital Course: 40 y/o M with PMH CAD (MI Jan 2022, s/p 1 stent placed to pLAD, restenosed), hx left heart thrombus (on Xarelto, no longer visualized since 2/2022), HFrEF (EF 10-15% on current admission), HIV (?compliance on Biktarvy), hx colon CA (s/p resection with chemo 2016, PAWAN) BIBEMS for worsening SOB x2 days, admitted for management of AHRF (initially requiring BIPAP) 2/2 bacterial/PCP pneumonia with contribution from CHF exacerbation and Sepsis with likely pulmonary source.  Patient was admitted to CCU for management of AHRF, with improved significantly after diuresis  with Lasix drip and starting treatment for pneumonia with Zosyn, Bactrim (tx dose for PCP), and prednisone.  After initial diuresis patient was clinically euvolemic and diuresis was held for several days.  . Upon admission, vanc/zosyn started for empiric coverage. Lasix gtt @5mg/hr started for volume overload on exam. Decadron discontinued to monitor clinical course on diuretics alone. Respiratory status continued to improved, patient now weaned to 2L NC.  UA+ for small LE, no nitrites. Ucx showing e. coli, however, pt denies any urinary symptoms. Repeat TTE showed EF 10-15%, dilated LV, severely dilated LA, mild/mod TR/MR, and no remaining thrombus. HIV treatment with Biktarvy was resumed. Sputum PCP PCR negative, steroids discontinued, Bactrim changed to prophylaxis dose.  RHC with high filling pressure and normal CO with evidence of pulmonary hypertension.  Originally there was plan to do bronch but that was deferred given the high risk due to poor cardiac function.  Overall patient much improved clinically, has remained hemodynamically stable, stable for step-down to RMF.    Primary cardiologist is at [facility] (Dr. Saniya Ramirez and Dr. Marlo Kapoor).  Consulting teams: Pulmonary and ID    INTERVAL HPI/OVERNIGHT EVENTS:   Patient is a 41y old  Male who presents with a chief complaint of acute hypoxic respiratory failure (07 Apr 2022 07:56)  Overnight around got 40mg IV Lasix given high filling pressures on RHC.  Patient reports feeling well, does not report any shortness of breath, chest pain, palpitations.  Otherwise ROS negative.    ICU Vital Signs Last 24 Hrs  T(C): 36.6 (07 Apr 2022 09:58), Max: 36.9 (06 Apr 2022 13:00)  T(F): 97.8 (07 Apr 2022 09:58), Max: 98.5 (06 Apr 2022 13:00)  HR: 75 (07 Apr 2022 11:00) (68 - 80)  BP: 91/53 (06 Apr 2022 19:11) (91/53 - 99/59)  BP(mean): 66 (06 Apr 2022 19:11) (66 - 74)  ABP: 95/63 (07 Apr 2022 11:00) (82/54 - 102/67)  ABP(mean): 76 (07 Apr 2022 11:00) (64 - 82)  RR: 20 (07 Apr 2022 11:00) (15 - 38)  SpO2: 94% (07 Apr 2022 11:00) (90% - 100%)    I&O's Summary    06 Apr 2022 07:01  -  07 Apr 2022 07:00  --------------------------------------------------------  IN: 1400 mL / OUT: 3955 mL / NET: -2555 mL    07 Apr 2022 07:01  -  07 Apr 2022 11:49  --------------------------------------------------------  IN: 750 mL / OUT: 0 mL / NET: 750 mL      LABS:                        10.4   10.07 )-----------( 350      ( 07 Apr 2022 05:55 )             29.3     04-07    135  |  101  |  25<H>  ----------------------------<  126<H>  4.3   |  24  |  1.56<H>    Ca    8.0<L>      07 Apr 2022 05:55  Phos  3.9     04-07  Mg     2.4     04-07    TPro  6.2  /  Alb  2.7<L>  /  TBili  0.3  /  DBili  x   /  AST  48<H>  /  ALT  97<H>  /  AlkPhos  53  04-07      CAPILLARY BLOOD GLUCOSE  POCT Blood Glucose.: 154 mg/dL (07 Apr 2022 11:33)  POCT Blood Glucose.: 122 mg/dL (07 Apr 2022 06:54)  POCT Blood Glucose.: 151 mg/dL (06 Apr 2022 21:06)  POCT Blood Glucose.: 110 mg/dL (06 Apr 2022 17:50)    ABG - ( 05 Apr 2022 15:17 )  pH, Arterial: 7.47  pH, Blood: x     /  pCO2: 38    /  pO2: 102   / HCO3: 28    / Base Excess: 3.9   /  SaO2: 97.0        RADIOLOGY & ADDITIONAL TESTS:    Consultant(s) Notes Reviewed:  [x ] YES  [ ] NO    MEDICATIONS  (STANDING):  aspirin  chewable 81 milliGRAM(s) Oral daily  atorvastatin 80 milliGRAM(s) Oral at bedtime  bictegravir 50 mG/emtricitabine 200 mG/tenofovir alafenamide 25 mG (BIKTARVY) 1 Tablet(s) Oral daily  chlorhexidine 2% Cloths 1 Application(s) Topical <User Schedule>  clopidogrel Tablet 75 milliGRAM(s) Oral daily  dextrose 5%. 1000 milliLiter(s) (100 mL/Hr) IV Continuous <Continuous>  dextrose 5%. 1000 milliLiter(s) (50 mL/Hr) IV Continuous <Continuous>  dextrose 50% Injectable 25 Gram(s) IV Push once  dextrose 50% Injectable 12.5 Gram(s) IV Push once  dextrose 50% Injectable 25 Gram(s) IV Push once  glucagon  Injectable 1 milliGRAM(s) IntraMuscular once  insulin lispro (ADMELOG) corrective regimen sliding scale   SubCutaneous Before meals and at bedtime  metoprolol tartrate 12.5 milliGRAM(s) Oral every 12 hours  pantoprazole    Tablet 40 milliGRAM(s) Oral before breakfast  piperacillin/tazobactam IVPB.. 3.375 Gram(s) IV Intermittent every 6 hours  trimethoprim / sulfamethoxazole IVPB 500 milliGRAM(s) IV Intermittent every 8 hours    MEDICATIONS  (PRN):  dextrose Oral Gel 15 Gram(s) Oral once PRN Blood Glucose LESS THAN 70 milliGRAM(s)/deciliter    PHYSICAL EXAM:  GENERAL: resting comfortably, no acute distress   HEAD:  Atraumatic, Normocephalic  EYES: EOMI, PERRLA, conjunctiva and sclera clear  NECK: Supple, No JVD, Normal thyroid, no enlarged nodes  NERVOUS SYSTEM:  Alert & Awake.   CHEST/LUNG: B/L good air entry; No rales, rhonchi, or wheezing  HEART: S1S2 normal, no S3, Regular rate and rhythm; No murmurs  ABDOMEN: Soft, Nontender, Nondistended; Bowel sounds present  EXTREMITIES:  2+ Peripheral Pulses, No clubbing, cyanosis, or edema  LYMPH: No lymphadenopathy noted  SKIN: Large scar near buttock from previous surgery     Care Discussed with Consultants/Other Providers [ x] YES  [ ] NO Transfer from CCU to Cardiac Tele    Hospital Course: 40 y/o M with PMH CAD (MI Jan 2022, s/p 1 stent placed to pLAD, restenosed), hx left heart thrombus (on Xarelto, no longer visualized since 2/2022), HFrEF (EF 10-15% on current admission), HIV (?compliance on Biktarvy), hx colon CA (s/p resection with chemo 2016, PAWAN) BIBEMS for worsening SOB x2 days, admitted for management of AHRF (initially requiring BIPAP) 2/2 bacterial/PCP pneumonia with contribution from CHF exacerbation and Sepsis with likely pulmonary source.  Patient was admitted to CCU for management of AHRF, with improved significantly after diuresis  with Lasix drip and starting treatment for pneumonia with Zosyn, Bactrim (tx dose for PCP), and prednisone.  After initial diuresis patient was clinically euvolemic and diuresis was held for several days.  . Upon admission, vanc/zosyn started for empiric coverage. Lasix gtt @5mg/hr started for volume overload on exam. Decadron discontinued to monitor clinical course on diuretics alone. Respiratory status continued to improved, patient now weaned to 2L NC.  UA+ for small LE, no nitrites. Ucx showing e. coli, however, pt denies any urinary symptoms. Repeat TTE showed EF 10-15%, dilated LV, severely dilated LA, mild/mod TR/MR, and no remaining thrombus. HIV treatment with Biktarvy was resumed. Sputum PCP PCR negative, steroids discontinued, Bactrim changed to prophylaxis dose.  RHC with high filling pressure and normal CO with evidence of pulmonary hypertension.  Originally there was plan to do bronch but that was deferred given the high risk due to poor cardiac function.  Overall patient much improved clinically, has remained hemodynamically stable, stable for step-down to RMF.    Primary cardiologist is at [facility] (Dr. Saniya Ramirez and Dr. Marlo Kapoor).  Consulting teams: Pulmonary, ID, Advance HF    INTERVAL HPI/OVERNIGHT EVENTS:   Patient is a 41y old  Male who presents with a chief complaint of acute hypoxic respiratory failure (07 Apr 2022 07:56)  Overnight around got 40mg IV Lasix given high filling pressures on RHC.  Patient reports feeling well, does not report any shortness of breath, chest pain, palpitations.  Otherwise ROS negative.    ICU Vital Signs Last 24 Hrs  T(C): 36.6 (07 Apr 2022 09:58), Max: 36.9 (06 Apr 2022 13:00)  T(F): 97.8 (07 Apr 2022 09:58), Max: 98.5 (06 Apr 2022 13:00)  HR: 75 (07 Apr 2022 11:00) (68 - 80)  BP: 91/53 (06 Apr 2022 19:11) (91/53 - 99/59)  BP(mean): 66 (06 Apr 2022 19:11) (66 - 74)  ABP: 95/63 (07 Apr 2022 11:00) (82/54 - 102/67)  ABP(mean): 76 (07 Apr 2022 11:00) (64 - 82)  RR: 20 (07 Apr 2022 11:00) (15 - 38)  SpO2: 94% (07 Apr 2022 11:00) (90% - 100%)    I&O's Summary    06 Apr 2022 07:01  -  07 Apr 2022 07:00  --------------------------------------------------------  IN: 1400 mL / OUT: 3955 mL / NET: -2555 mL    07 Apr 2022 07:01  -  07 Apr 2022 11:49  --------------------------------------------------------  IN: 750 mL / OUT: 0 mL / NET: 750 mL      LABS:                        10.4   10.07 )-----------( 350      ( 07 Apr 2022 05:55 )             29.3     04-07    135  |  101  |  25<H>  ----------------------------<  126<H>  4.3   |  24  |  1.56<H>    Ca    8.0<L>      07 Apr 2022 05:55  Phos  3.9     04-07  Mg     2.4     04-07    TPro  6.2  /  Alb  2.7<L>  /  TBili  0.3  /  DBili  x   /  AST  48<H>  /  ALT  97<H>  /  AlkPhos  53  04-07      CAPILLARY BLOOD GLUCOSE  POCT Blood Glucose.: 154 mg/dL (07 Apr 2022 11:33)  POCT Blood Glucose.: 122 mg/dL (07 Apr 2022 06:54)  POCT Blood Glucose.: 151 mg/dL (06 Apr 2022 21:06)  POCT Blood Glucose.: 110 mg/dL (06 Apr 2022 17:50)    ABG - ( 05 Apr 2022 15:17 )  pH, Arterial: 7.47  pH, Blood: x     /  pCO2: 38    /  pO2: 102   / HCO3: 28    / Base Excess: 3.9   /  SaO2: 97.0        RADIOLOGY & ADDITIONAL TESTS:    Consultant(s) Notes Reviewed:  [x ] YES  [ ] NO    MEDICATIONS  (STANDING):  aspirin  chewable 81 milliGRAM(s) Oral daily  atorvastatin 80 milliGRAM(s) Oral at bedtime  bictegravir 50 mG/emtricitabine 200 mG/tenofovir alafenamide 25 mG (BIKTARVY) 1 Tablet(s) Oral daily  chlorhexidine 2% Cloths 1 Application(s) Topical <User Schedule>  clopidogrel Tablet 75 milliGRAM(s) Oral daily  dextrose 5%. 1000 milliLiter(s) (100 mL/Hr) IV Continuous <Continuous>  dextrose 5%. 1000 milliLiter(s) (50 mL/Hr) IV Continuous <Continuous>  dextrose 50% Injectable 25 Gram(s) IV Push once  dextrose 50% Injectable 12.5 Gram(s) IV Push once  dextrose 50% Injectable 25 Gram(s) IV Push once  glucagon  Injectable 1 milliGRAM(s) IntraMuscular once  insulin lispro (ADMELOG) corrective regimen sliding scale   SubCutaneous Before meals and at bedtime  metoprolol tartrate 12.5 milliGRAM(s) Oral every 12 hours  pantoprazole    Tablet 40 milliGRAM(s) Oral before breakfast  piperacillin/tazobactam IVPB.. 3.375 Gram(s) IV Intermittent every 6 hours  trimethoprim / sulfamethoxazole IVPB 500 milliGRAM(s) IV Intermittent every 8 hours    MEDICATIONS  (PRN):  dextrose Oral Gel 15 Gram(s) Oral once PRN Blood Glucose LESS THAN 70 milliGRAM(s)/deciliter    PHYSICAL EXAM:  GENERAL: resting comfortably, no acute distress   HEAD:  Atraumatic, Normocephalic  EYES: EOMI, PERRLA, conjunctiva and sclera clear  NECK: Supple, No JVD, Normal thyroid, no enlarged nodes  NERVOUS SYSTEM:  Alert & Awake.   CHEST/LUNG: B/L good air entry; No rales, rhonchi, or wheezing  HEART: S1S2 normal, no S3, Regular rate and rhythm; No murmurs  ABDOMEN: Soft, Nontender, Nondistended; Bowel sounds present  EXTREMITIES:  2+ Peripheral Pulses, No clubbing, cyanosis, or edema  LYMPH: No lymphadenopathy noted  SKIN: Large scar near buttock from previous surgery     Care Discussed with Consultants/Other Providers [ x] YES  [ ] NO

## 2022-04-07 NOTE — PROGRESS NOTE ADULT - SUBJECTIVE AND OBJECTIVE BOX
INCOMPLETE NOTE - NOTE IN PROGRESS**  INFECTIOUS DISEASE PROGRESS NOTE    Interval Events: Pt weaned to 2LNC. Went for RHC.    Subjective:  Patient seen and evaluated at bedside. Pt in NAD, laying in bed. He reports feeling well today with improved breathing. He no longer endorses dry cough and denies fever, chills, congestion, sore throat, SOB, CP. He also denies N/V, abd pain, diarrhea, dysuria, urinary frequency/urgency.     ROS:  CONSTITUTIONAL: No weakness, fevers or chills  EYES/ENT: No visual changes;  No vertigo or throat pain   NECK: No pain or stiffness  RESPIRATORY: no cough, no SOB, no wheezing, no hemoptysis  CARDIOVASCULAR:  no chest pain, no palpitations  GASTROINTESTINAL: No abdominal or epigastric pain. No nausea, vomiting, or hematemesis; No diarrhea or constipation. No melena or hematochezia.  GENITOURINARY: No dysuria, frequency or hematuria  NEUROLOGICAL: No numbness or weakness  SKIN: No itching, rashes     ANTIBIOTICS/RELEVANT:  antimicrobials  bictegravir 50 mG/emtricitabine 200 mG/tenofovir alafenamide 25 mG (BIKTARVY) 1 Tablet(s) Oral daily  piperacillin/tazobactam IVPB.. 3.375 Gram(s) IV Intermittent every 6 hours  trimethoprim / sulfamethoxazole IVPB 500 milliGRAM(s) IV Intermittent every 8 hours    immunologic:    OTHER:  aspirin  chewable 81 milliGRAM(s) Oral daily  atorvastatin 80 milliGRAM(s) Oral at bedtime  chlorhexidine 2% Cloths 1 Application(s) Topical <User Schedule>  clopidogrel Tablet 75 milliGRAM(s) Oral daily  dextrose 5%. 1000 milliLiter(s) IV Continuous <Continuous>  dextrose 5%. 1000 milliLiter(s) IV Continuous <Continuous>  dextrose 50% Injectable 25 Gram(s) IV Push once  dextrose 50% Injectable 12.5 Gram(s) IV Push once  dextrose 50% Injectable 25 Gram(s) IV Push once  dextrose Oral Gel 15 Gram(s) Oral once PRN  glucagon  Injectable 1 milliGRAM(s) IntraMuscular once  insulin lispro (ADMELOG) corrective regimen sliding scale   SubCutaneous Before meals and at bedtime  metoprolol tartrate 12.5 milliGRAM(s) Oral every 12 hours  pantoprazole    Tablet 40 milliGRAM(s) Oral before breakfast      Allergies    No Known Allergies    Intolerances        Objective:  Vital Signs Last 24 Hrs  T(C): 36.6 (07 Apr 2022 05:07), Max: 36.9 (06 Apr 2022 13:00)  T(F): 97.9 (07 Apr 2022 05:07), Max: 98.5 (06 Apr 2022 13:00)  HR: 71 (07 Apr 2022 08:00) (63 - 80)  BP: 91/53 (06 Apr 2022 19:11) (89/56 - 99/59)  BP(mean): 66 (06 Apr 2022 19:11) (66 - 74)  RR: 20 (07 Apr 2022 08:00) (15 - 38)  SpO2: 94% (07 Apr 2022 08:00) (90% - 100%)    PHYSICAL EXAM: **NOTE IN PROGRESS**  General: NAD, laying in bed, on NC 2L  HEENT: NC/AT; PERRL, clear conjunctiva  Neck: supple  Respiratory: clear to auscultation b/l, no accessory muscle use  Cardiovascular: +S1/S2; RRR  Abdomen: soft, NT/ND; +BS x4  Extremities: 2+ peripheral pulses b/l; trace pitting edema  Skin: normal color and turgor; no rash  Neurological: AAOx3. No focal neurologic deficits. Gait deferred.   Psychiatry: Mood and affect appropriate.    LABS:                        10.4   10.07 )-----------( 350      ( 07 Apr 2022 05:55 )             29.3     04-07    135  |  101  |  25<H>  ----------------------------<  126<H>  4.3   |  24  |  1.56<H>    Ca    8.0<L>      07 Apr 2022 05:55  Phos  3.9     04-07  Mg     2.4     04-07    TPro  6.2  /  Alb  2.7<L>  /  TBili  0.3  /  DBili  x   /  AST  48<H>  /  ALT  97<H>  /  AlkPhos  53  04-07      MICROBIOLOGY  04/04 COVID: negative    Quantiferon Gold Plus TB (04.05.22 @ 12:13)    Quantiferon TB Plus: Negative: NEGATIVE    RECENT CULTURES  04/04 BCx: NGTD  04/04 UCx: E.coli    IMAGING   INCOMPLETE NOTE - NOTE IN PROGRESS**  INFECTIOUS DISEASE PROGRESS NOTE    Interval Events: Pt weaned to 2LNC. Went for RHC.    Subjective:  Patient seen and evaluated at bedside. Pt in NAD, laying in bed. He reports feeling well today with improved breathing. He no longer endorses dry cough and denies fever, chills, congestion, sore throat, SOB, CP. He also denies N/V, abd pain, diarrhea, dysuria, urinary frequency/urgency.     ROS:  CONSTITUTIONAL: No weakness, fevers or chills  EYES/ENT: No visual changes;  No vertigo or throat pain   NECK: No pain or stiffness  RESPIRATORY: no cough, no SOB, no wheezing, no hemoptysis  CARDIOVASCULAR:  no chest pain, no palpitations  GASTROINTESTINAL: No abdominal or epigastric pain. No nausea, vomiting, or hematemesis; No diarrhea or constipation. No melena or hematochezia.  GENITOURINARY: No dysuria, frequency or hematuria  NEUROLOGICAL: No numbness or weakness  SKIN: No itching, rashes     ANTIBIOTICS/RELEVANT:  antimicrobials  bictegravir 50 mG/emtricitabine 200 mG/tenofovir alafenamide 25 mG (BIKTARVY) 1 Tablet(s) Oral daily  piperacillin/tazobactam IVPB.. 3.375 Gram(s) IV Intermittent every 6 hours  trimethoprim / sulfamethoxazole IVPB 500 milliGRAM(s) IV Intermittent every 8 hours    immunologic:    OTHER:  aspirin  chewable 81 milliGRAM(s) Oral daily  atorvastatin 80 milliGRAM(s) Oral at bedtime  chlorhexidine 2% Cloths 1 Application(s) Topical <User Schedule>  clopidogrel Tablet 75 milliGRAM(s) Oral daily  dextrose 5%. 1000 milliLiter(s) IV Continuous <Continuous>  dextrose 5%. 1000 milliLiter(s) IV Continuous <Continuous>  dextrose 50% Injectable 25 Gram(s) IV Push once  dextrose 50% Injectable 12.5 Gram(s) IV Push once  dextrose 50% Injectable 25 Gram(s) IV Push once  dextrose Oral Gel 15 Gram(s) Oral once PRN  glucagon  Injectable 1 milliGRAM(s) IntraMuscular once  insulin lispro (ADMELOG) corrective regimen sliding scale   SubCutaneous Before meals and at bedtime  metoprolol tartrate 12.5 milliGRAM(s) Oral every 12 hours  pantoprazole    Tablet 40 milliGRAM(s) Oral before breakfast      Allergies    No Known Allergies    Intolerances        Objective:  Vital Signs Last 24 Hrs  T(C): 36.6 (07 Apr 2022 05:07), Max: 36.9 (06 Apr 2022 13:00)  T(F): 97.9 (07 Apr 2022 05:07), Max: 98.5 (06 Apr 2022 13:00)  HR: 71 (07 Apr 2022 08:00) (63 - 80)  BP: 91/53 (06 Apr 2022 19:11) (89/56 - 99/59)  BP(mean): 66 (06 Apr 2022 19:11) (66 - 74)  RR: 20 (07 Apr 2022 08:00) (15 - 38)  SpO2: 94% (07 Apr 2022 08:00) (90% - 100%)    PHYSICAL EXAM: **NOTE IN PROGRESS**  General: NAD, laying in bed, on NC 2L  HEENT: NC/AT; PERRL, clear conjunctiva  Neck: supple  Respiratory: clear to auscultation b/l, no accessory muscle use  Cardiovascular: +S1/S2; RRR  Abdomen: soft, NT/ND; +BS x4  Extremities: 2+ peripheral pulses b/l; trace pitting edema  Skin: normal color and turgor; no rash  Neurological: AAOx3. No focal neurologic deficits. Gait deferred.   Psychiatry: Mood and affect appropriate.    LABS:                        10.4   10.07 )-----------( 350      ( 07 Apr 2022 05:55 )             29.3     04-07    135  |  101  |  25<H>  ----------------------------<  126<H>  4.3   |  24  |  1.56<H>    Ca    8.0<L>      07 Apr 2022 05:55  Phos  3.9     04-07  Mg     2.4     04-07    TPro  6.2  /  Alb  2.7<L>  /  TBili  0.3  /  DBili  x   /  AST  48<H>  /  ALT  97<H>  /  AlkPhos  53  04-07      MICROBIOLOGY  04/04 COVID: negative    Quantiferon Gold Plus TB (04.05.22 @ 12:13)    Quantiferon TB Plus: Negative: NEGATIVE    Pneumocystis jiroveci PCR (04.05.22 @ 14:43)    Pneumocystis Specimen Source: SPUTUM    Pneumocystis PCR Result: Negative    RECENT CULTURES  04/04 BCx: NGTD  04/04 UCx: E.coli    IMAGING   INFECTIOUS DISEASE PROGRESS NOTE    Interval Events: Pt weaned to 2LNC. Went for RHC.    Subjective:  Patient seen and evaluated at bedside. Pt in NAD, laying in bed. He reports feeling well today with improved breathing. He no longer endorses dry cough and denies fever, chills, congestion, sore throat, SOB, CP. He also denies N/V, abd pain, diarrhea, dysuria, urinary frequency/urgency.     ROS:  CONSTITUTIONAL: No weakness, fevers or chills  EYES/ENT: No visual changes;  No vertigo or throat pain   NECK: No pain or stiffness  RESPIRATORY: no cough, no SOB, no wheezing, no hemoptysis  CARDIOVASCULAR:  no chest pain, no palpitations  GASTROINTESTINAL: No abdominal or epigastric pain. No nausea, vomiting, or hematemesis; No diarrhea or constipation. No melena or hematochezia.  GENITOURINARY: No dysuria, frequency or hematuria  NEUROLOGICAL: No numbness or weakness  SKIN: No itching, rashes     ANTIBIOTICS/RELEVANT:  antimicrobials  bictegravir 50 mG/emtricitabine 200 mG/tenofovir alafenamide 25 mG (BIKTARVY) 1 Tablet(s) Oral daily  piperacillin/tazobactam IVPB.. 3.375 Gram(s) IV Intermittent every 6 hours  trimethoprim / sulfamethoxazole IVPB 500 milliGRAM(s) IV Intermittent every 8 hours    immunologic:    OTHER:  aspirin  chewable 81 milliGRAM(s) Oral daily  atorvastatin 80 milliGRAM(s) Oral at bedtime  chlorhexidine 2% Cloths 1 Application(s) Topical <User Schedule>  clopidogrel Tablet 75 milliGRAM(s) Oral daily  dextrose 5%. 1000 milliLiter(s) IV Continuous <Continuous>  dextrose 5%. 1000 milliLiter(s) IV Continuous <Continuous>  dextrose 50% Injectable 25 Gram(s) IV Push once  dextrose 50% Injectable 12.5 Gram(s) IV Push once  dextrose 50% Injectable 25 Gram(s) IV Push once  dextrose Oral Gel 15 Gram(s) Oral once PRN  glucagon  Injectable 1 milliGRAM(s) IntraMuscular once  insulin lispro (ADMELOG) corrective regimen sliding scale   SubCutaneous Before meals and at bedtime  metoprolol tartrate 12.5 milliGRAM(s) Oral every 12 hours  pantoprazole    Tablet 40 milliGRAM(s) Oral before breakfast      Allergies    No Known Allergies    Intolerances        Objective:  Vital Signs Last 24 Hrs  T(C): 36.6 (07 Apr 2022 05:07), Max: 36.9 (06 Apr 2022 13:00)  T(F): 97.9 (07 Apr 2022 05:07), Max: 98.5 (06 Apr 2022 13:00)  HR: 71 (07 Apr 2022 08:00) (63 - 80)  BP: 91/53 (06 Apr 2022 19:11) (89/56 - 99/59)  BP(mean): 66 (06 Apr 2022 19:11) (66 - 74)  RR: 20 (07 Apr 2022 08:00) (15 - 38)  SpO2: 94% (07 Apr 2022 08:00) (90% - 100%)    PHYSICAL EXAM:   General: NAD, laying in bed, on NC 2L  HEENT: NC/AT; PERRL, clear conjunctiva  Neck: supple  Respiratory: clear to auscultation b/l, no accessory muscle use  Cardiovascular: +S1/S2; RRR  Abdomen: soft, NT/ND; +BS x4  Extremities: 2+ peripheral pulses b/l; trace pitting edema  Skin: normal color and turgor; no rash  Neurological: AAOx3. No focal neurologic deficits. Gait deferred.   Psychiatry: Mood and affect appropriate.    LABS:                        10.4   10.07 )-----------( 350      ( 07 Apr 2022 05:55 )             29.3     04-07    135  |  101  |  25<H>  ----------------------------<  126<H>  4.3   |  24  |  1.56<H>    Ca    8.0<L>      07 Apr 2022 05:55  Phos  3.9     04-07  Mg     2.4     04-07    TPro  6.2  /  Alb  2.7<L>  /  TBili  0.3  /  DBili  x   /  AST  48<H>  /  ALT  97<H>  /  AlkPhos  53  04-07      MICROBIOLOGY  04/04 COVID: negative    Quantiferon Gold Plus TB (04.05.22 @ 12:13)    Quantiferon TB Plus: Negative: NEGATIVE    Pneumocystis jiroveci PCR (04.05.22 @ 14:43)    Pneumocystis Specimen Source: SPUTUM    Pneumocystis PCR Result: Negative    RECENT CULTURES  04/04 BCx: NGTD  04/04 UCx: E.coli

## 2022-04-07 NOTE — PROGRESS NOTE ADULT - SUBJECTIVE AND OBJECTIVE BOX
PULMONARY CONSULT SERVICE FOLLOW-UP NOTE    INTERVAL HPI:  Reviewed chart and overnight events; patient seen and examined at bedside. Feels much better. s/p RHC yesterday. No new complaints. Breathing improved.    MEDICATIONS:  Antimicrobials:  bictegravir 50 mG/emtricitabine 200 mG/tenofovir alafenamide 25 mG (BIKTARVY) 1 Tablet(s) Oral daily  piperacillin/tazobactam IVPB.. 3.375 Gram(s) IV Intermittent every 6 hours  trimethoprim / sulfamethoxazole IVPB 500 milliGRAM(s) IV Intermittent every 8 hours    Anticoagulants:  aspirin  chewable 81 milliGRAM(s) Oral daily  clopidogrel Tablet 75 milliGRAM(s) Oral daily    Cardiac:  metoprolol tartrate 12.5 milliGRAM(s) Oral every 12 hours    Allergies  No Known Allergies    Vital Signs Last 24 Hrs  T(C): 36.6 (07 Apr 2022 05:07), Max: 36.9 (06 Apr 2022 13:00)  T(F): 97.9 (07 Apr 2022 05:07), Max: 98.5 (06 Apr 2022 13:00)  HR: 68 (07 Apr 2022 09:00) (63 - 80)  BP: 91/53 (06 Apr 2022 19:11) (89/56 - 99/59)  BP(mean): 66 (06 Apr 2022 19:11) (66 - 74)  RR: 22 (07 Apr 2022 09:00) (15 - 38)  SpO2: 93% (07 Apr 2022 09:00) (90% - 100%)    04-06 @ 07:01  -  04-07 @ 07:00  --------------------------------------------------------  IN: 1400 mL / OUT: 3955 mL / NET: -2555 mL    PHYSICAL EXAM:  Constitutional: WD, comfortable  Head: NC/AT  EENT: anicteric sclera; oropharynx clear, MMM  Neck: supple, no appreciable JVD  Respiratory: bilbasilar crackles; no W/R  Cardiovascular: +S1/S2, regular tachycardia  Gastrointestinal: soft, NT/ND  Extremities: WWP; trace pitting edema, clubbing or cyanosis  Vascular: 2+ radial and pedal pulses  Neurological: alert, oriented    LABS:  ABG - ( 05 Apr 2022 15:17 )  pH, Arterial: 7.47  pH, Blood: x     /  pCO2: 38    /  pO2: 102   / HCO3: 28    / Base Excess: 3.9   /  SaO2: 97.0      CBC Full  -  ( 07 Apr 2022 05:55 )  WBC Count : 10.07 K/uL  RBC Count : 3.60 M/uL  Hemoglobin : 10.4 g/dL  Hematocrit : 29.3 %  Platelet Count - Automated : 350 K/uL  Mean Cell Volume : 81.4 fl  Mean Cell Hemoglobin : 28.9 pg  Mean Cell Hemoglobin Concentration : 35.5 gm/dL  Auto Neutrophil # : 8.12 K/uL  Auto Lymphocyte # : 1.17 K/uL  Auto Monocyte # : 0.70 K/uL  Auto Eosinophil # : 0.00 K/uL  Auto Basophil # : 0.01 K/uL  Auto Neutrophil % : 80.6 %  Auto Lymphocyte % : 11.6 %  Auto Monocyte % : 7.0 %  Auto Eosinophil % : 0.0 %  Auto Basophil % : 0.1 %    04-07    135  |  101  |  25<H>  ----------------------------<  126<H>  4.3   |  24  |  1.56<H>    Ca    8.0<L>      07 Apr 2022 05:55  Phos  3.9     04-07  Mg     2.4     04-07    TPro  6.2  /  Alb  2.7<L>  /  TBili  0.3  /  DBili  x   /  AST  48<H>  /  ALT  97<H>  /  AlkPhos  53  04-07    RADIOLOGY & ADDITIONAL STUDIES: Reviewed.

## 2022-04-07 NOTE — DIETITIAN INITIAL EVALUATION ADULT - PERTINENT MEDS FT
Heparin  Asprin  Plavix  BACTRIM  ROCEPHIN  BIKTARVY  corrective sliding scale  TOPROL XL  Demadex  Diovan   Lipitor   Lopressor  Protonix

## 2022-04-07 NOTE — PROGRESS NOTE ADULT - PROBLEM SELECTOR PLAN 1
Patient with HFrEF (EF 10-15%) likely due to ischemic cardiomyopathy. Primary cardiologist is at [facility] (Dr. Saniya Ramirez and Dr. Marlo Kapoor). Home meds: Entresto, torsemide, Toprol XL.  On admission, signs and symptoms of exacerbation diffuse pulmonary edema on XR, volume overloaded on exam with edema, S3, ProBNP 34,267. S/p lasix 80mg and NG drip in ED, briefly on Lasix drip, currently being monitored off diuretics.    -TTE (4/4) shows EF 10-15%, no LV thrombus, dilated LV, severely dilated LA, mild/mod TR/MR  -RHC with high filling pressure, normal CO, evidence of PH  - Currently on Lopressor 12.5 BID, will change to Toporol 25 daily starting tomorrow  - Currently off home Entresto due to low BP, BP now improved so will start Valsartan 20 BID tomorrow   - Will start Torsemide 20mg daily tomorrow  - Advanced HF team following, appreciate recs

## 2022-04-07 NOTE — PROGRESS NOTE ADULT - PROBLEM SELECTOR PLAN 5
IMPROVING  Patient presented with worsening SOB x2d, found to be in AHRF requiring BIPAP on admission, etiology likely bacterial/PCP pneumonia with some contribution from CHF exacerbation.   - now weaned off supplemental oxygen. currently on RA  - Treat CHF as above  - Treat pneumonia as below

## 2022-04-07 NOTE — PROGRESS NOTE ADULT - ASSESSMENT
41M PMH CAD, MI (Jan 2022, stent thrombosed), Left heart thrombus (Xarelto) CHF (unknown EF), colon cancer (PAWAN, s/p resection/chemo 2016), HIV (Biktarvy), former smoker (<1 pack year) BIBEMS for worsening shortness of breath for two days with nonproductive cough, admitted to CCU for acute hypoxic respiratory failure 2/2 PNA vs acute heart failure exacerbation, now clinically euvolemic, being treated for bacterial pneumonia, resuming GDMT for CHF, hemodynamically stable, stable for stepdown to cardiac telemetry.

## 2022-04-07 NOTE — PROGRESS NOTE ADULT - SUBJECTIVE AND OBJECTIVE BOX
Transfer from CCU to Cardiac Tele    Hospital Course: 40 y/o M with PMH CAD (MI Jan 2022, s/p 1 stent placed to pLAD, restenosed), hx left heart thrombus (on Xarelto, no longer visualized since 2/2022), HFrEF (EF 10-15% on current admission), HIV (?compliance on Biktarvy), hx colon CA (s/p resection with chemo 2016, PAWAN) BIBEMS for worsening SOB x2 days, admitted for management of AHRF (initially requiring BIPAP) 2/2 bacterial/PCP pneumonia with contribution from CHF exacerbation and Sepsis with likely pulmonary source.  Patient was admitted to CCU for management of AHRF, with improved significantly after diuresis  with Lasix drip and starting treatment for pneumonia with Zosyn, Bactrim (tx dose for PCP), and prednisone.  After initial diuresis patient was clinically euvolemic and diuresis was held for several days.  . Upon admission, vanc/zosyn started for empiric coverage. Lasix gtt @5mg/hr started for volume overload on exam. Decadron discontinued to monitor clinical course on diuretics alone. Respiratory status continued to improved, patient now weaned to 2L NC.  UA+ for small LE, no nitrites. Ucx showing e. coli, however, pt denies any urinary symptoms. Repeat TTE showed EF 10-15%, dilated LV, severely dilated LA, mild/mod TR/MR, and no remaining thrombus. HIV treatment with Biktarvy was resumed. Sputum PCP PCR negative, steroids discontinued, Bactrim changed to prophylaxis dose.  RHC with high filling pressure and normal CO with evidence of pulmonary hypertension.  Originally there was plan to do bronch but that was deferred given the high risk due to poor cardiac function.  Overall patient much improved clinically, has remained hemodynamically stable, stable for step-down to cardiac tele.      SUBJECTIVE:  Patient seen and examined at bedside.    Vital Signs Last 12 Hrs  T(F): 97.6 (04-07-22 @ 12:48), Max: 97.9 (04-07-22 @ 05:07)  HR: 75 (04-07-22 @ 11:00) (68 - 77)  BP: --  BP(mean): --  RR: 20 (04-07-22 @ 11:00) (20 - 38)  SpO2: 94% (04-07-22 @ 11:00) (90% - 95%)  I&O's Summary    06 Apr 2022 07:01  -  07 Apr 2022 07:00  --------------------------------------------------------  IN: 1400 mL / OUT: 3955 mL / NET: -2555 mL    07 Apr 2022 07:01  -  07 Apr 2022 13:15  --------------------------------------------------------  IN: 1464 mL / OUT: 550 mL / NET: 914 mL        PHYSICAL EXAM:  Constitutional: NAD, comfortable in bed.  HEENT: NC/AT, PERRLA, EOMI, no conjunctival pallor or scleral icterus, MMM  Neck: Supple, no JVD  Respiratory: CTA B/L. No w/r/r.   Cardiovascular: RRR, normal S1 and S2, no m/r/g.   Gastrointestinal: +BS, soft NTND, no guarding or rebound tenderness, no palpable masses   Extremities: wwp; no cyanosis, clubbing or edema.   Vascular: Pulses equal and strong throughout.   Neurological: AAOx3, no CN deficits, strength and sensation intact throughout.   Skin: No gross skin abnormalities or rashes        LABS:                        10.4   10.07 )-----------( 350      ( 07 Apr 2022 05:55 )             29.3     04-07    135  |  101  |  25<H>  ----------------------------<  126<H>  4.3   |  24  |  1.56<H>    Ca    8.0<L>      07 Apr 2022 05:55  Phos  3.9     04-07  Mg     2.4     04-07    TPro  6.2  /  Alb  2.7<L>  /  TBili  0.3  /  DBili  x   /  AST  48<H>  /  ALT  97<H>  /  AlkPhos  53  04-07            RADIOLOGY & ADDITIONAL TESTS:    MEDICATIONS  (STANDING):  aspirin  chewable 81 milliGRAM(s) Oral daily  atorvastatin 20 milliGRAM(s) Oral at bedtime  bictegravir 50 mG/emtricitabine 200 mG/tenofovir alafenamide 25 mG (BIKTARVY) 1 Tablet(s) Oral daily  cefTRIAXone   IVPB 2000 milliGRAM(s) IV Intermittent every 24 hours  chlorhexidine 2% Cloths 1 Application(s) Topical <User Schedule>  clopidogrel Tablet 75 milliGRAM(s) Oral daily  dextrose 5%. 1000 milliLiter(s) (100 mL/Hr) IV Continuous <Continuous>  dextrose 5%. 1000 milliLiter(s) (50 mL/Hr) IV Continuous <Continuous>  dextrose 50% Injectable 25 Gram(s) IV Push once  dextrose 50% Injectable 12.5 Gram(s) IV Push once  dextrose 50% Injectable 25 Gram(s) IV Push once  glucagon  Injectable 1 milliGRAM(s) IntraMuscular once  heparin   Injectable 5000 Unit(s) SubCutaneous every 8 hours  insulin lispro (ADMELOG) corrective regimen sliding scale   SubCutaneous Before meals and at bedtime  metoprolol tartrate 12.5 milliGRAM(s) Oral every 12 hours  pantoprazole    Tablet 40 milliGRAM(s) Oral before breakfast    MEDICATIONS  (PRN):  dextrose Oral Gel 15 Gram(s) Oral once PRN Blood Glucose LESS THAN 70 milliGRAM(s)/deciliter   Transfer from CCU to Cardiac Tele    Hospital Course: 42 y/o M with PMH CAD (MI Jan 2022, s/p 1 stent placed to pLAD, restenosed), hx left heart thrombus (on Xarelto, no longer visualized since 2/2022), HFrEF (EF 10-15% on current admission), HIV (?compliance on Biktarvy), hx colon CA (s/p resection with chemo 2016, PAWAN) BIBEMS for worsening SOB x2 days, admitted for management of AHRF (initially requiring BIPAP) 2/2 bacterial/PCP pneumonia with contribution from CHF exacerbation and Sepsis with likely pulmonary source.  Patient was admitted to CCU for management of AHRF, with improved significantly after diuresis  with Lasix drip and starting treatment for pneumonia with Zosyn, Bactrim (tx dose for PCP), and prednisone.  After initial diuresis patient was clinically euvolemic and diuresis was held for several days.  . Upon admission, vanc/zosyn started for empiric coverage. Lasix gtt @5mg/hr started for volume overload on exam. Decadron discontinued to monitor clinical course on diuretics alone. Respiratory status continued to improved, patient now weaned to 2L NC.  UA+ for small LE, no nitrites. Ucx showing e. coli, however, pt denies any urinary symptoms. Repeat TTE showed EF 10-15%, dilated LV, severely dilated LA, mild/mod TR/MR, and no remaining thrombus. HIV treatment with Biktarvy was resumed. Sputum PCP PCR negative, steroids discontinued, Bactrim changed to prophylaxis dose.  RHC with high filling pressure and normal CO with evidence of pulmonary hypertension.  Originally there was plan to do bronch but that was deferred given the high risk due to poor cardiac function.  Overall patient much improved clinically, has remained hemodynamically stable, stable for step-down to cardiac tele.      SUBJECTIVE:  Patient seen and examined at bedside. Denies shortness of breath, states it has significantly improved since admission. Denies chest pain, dizziness, palpitations,     Vital Signs Last 12 Hrs  T(F): 97.6 (04-07-22 @ 12:48), Max: 97.9 (04-07-22 @ 05:07)  HR: 75 (04-07-22 @ 11:00) (68 - 77)  BP: --  BP(mean): --  RR: 20 (04-07-22 @ 11:00) (20 - 38)  SpO2: 94% (04-07-22 @ 11:00) (90% - 95%)  I&O's Summary    06 Apr 2022 07:01  -  07 Apr 2022 07:00  --------------------------------------------------------  IN: 1400 mL / OUT: 3955 mL / NET: -2555 mL    07 Apr 2022 07:01  -  07 Apr 2022 13:15  --------------------------------------------------------  IN: 1464 mL / OUT: 550 mL / NET: 914 mL        PHYSICAL EXAM:  Constitutional: NAD, comfortable in bed.  HEENT: NC/AT, PERRLA, EOMI, no conjunctival pallor or scleral icterus, MMM  Neck: Supple, no JVD  Respiratory: CTA B/L. No w/r/r.   Cardiovascular: RRR, normal S1 and S2, no m/r/g.   Gastrointestinal: +BS, soft NTND, no guarding or rebound tenderness, no palpable masses   Extremities: wwp; no cyanosis, clubbing or edema.   Vascular: Pulses equal and strong throughout.   Neurological: AAOx3, no CN deficits, strength and sensation intact throughout.   Skin: No gross skin abnormalities or rashes        LABS:                        10.4   10.07 )-----------( 350      ( 07 Apr 2022 05:55 )             29.3     04-07    135  |  101  |  25<H>  ----------------------------<  126<H>  4.3   |  24  |  1.56<H>    Ca    8.0<L>      07 Apr 2022 05:55  Phos  3.9     04-07  Mg     2.4     04-07    TPro  6.2  /  Alb  2.7<L>  /  TBili  0.3  /  DBili  x   /  AST  48<H>  /  ALT  97<H>  /  AlkPhos  53  04-07            RADIOLOGY & ADDITIONAL TESTS:    MEDICATIONS  (STANDING):  aspirin  chewable 81 milliGRAM(s) Oral daily  atorvastatin 20 milliGRAM(s) Oral at bedtime  bictegravir 50 mG/emtricitabine 200 mG/tenofovir alafenamide 25 mG (BIKTARVY) 1 Tablet(s) Oral daily  cefTRIAXone   IVPB 2000 milliGRAM(s) IV Intermittent every 24 hours  chlorhexidine 2% Cloths 1 Application(s) Topical <User Schedule>  clopidogrel Tablet 75 milliGRAM(s) Oral daily  dextrose 5%. 1000 milliLiter(s) (100 mL/Hr) IV Continuous <Continuous>  dextrose 5%. 1000 milliLiter(s) (50 mL/Hr) IV Continuous <Continuous>  dextrose 50% Injectable 25 Gram(s) IV Push once  dextrose 50% Injectable 12.5 Gram(s) IV Push once  dextrose 50% Injectable 25 Gram(s) IV Push once  glucagon  Injectable 1 milliGRAM(s) IntraMuscular once  heparin   Injectable 5000 Unit(s) SubCutaneous every 8 hours  insulin lispro (ADMELOG) corrective regimen sliding scale   SubCutaneous Before meals and at bedtime  metoprolol tartrate 12.5 milliGRAM(s) Oral every 12 hours  pantoprazole    Tablet 40 milliGRAM(s) Oral before breakfast    MEDICATIONS  (PRN):  dextrose Oral Gel 15 Gram(s) Oral once PRN Blood Glucose LESS THAN 70 milliGRAM(s)/deciliter   Transfer from CCU to Cardiac Tele    Hospital Course: 42 y/o M with PMH CAD (MI Jan 2022, s/p 1 stent placed to pLAD, restenosed), hx left heart thrombus (on Xarelto, no longer visualized since 2/2022), HFrEF (EF 10-15% on current admission), HIV (?compliance on Biktarvy), hx colon CA (s/p resection with chemo 2016, PAWAN) BIBEMS for worsening SOB x2 days, admitted for management of AHRF (initially requiring BIPAP) 2/2 bacterial/PCP pneumonia with contribution from CHF exacerbation and Sepsis with likely pulmonary source.  Patient was admitted to CCU for management of AHRF, with improved significantly after diuresis  with Lasix drip and starting treatment for pneumonia with Zosyn, Bactrim (tx dose for PCP), and prednisone.  After initial diuresis patient was clinically euvolemic and diuresis was held for several days.  . Upon admission, vanc/zosyn started for empiric coverage. Lasix gtt @5mg/hr started for volume overload on exam. Decadron discontinued to monitor clinical course on diuretics alone. Respiratory status continued to improved, patient now weaned to 2L NC.  UA+ for small LE, no nitrites. Ucx showing e. coli, however, pt denies any urinary symptoms. Repeat TTE showed EF 10-15%, dilated LV, severely dilated LA, mild/mod TR/MR, and no remaining thrombus. HIV treatment with Biktarvy was resumed. Sputum PCP PCR negative, steroids discontinued, Bactrim changed to prophylaxis dose.  RHC with high filling pressure and normal CO with evidence of pulmonary hypertension.  Originally there was plan to do bronch but that was deferred given the high risk due to poor cardiac function.  Overall patient much improved clinically, has remained hemodynamically stable, stable for step-down to cardiac tele.      SUBJECTIVE:  Patient seen and examined at bedside. Denies shortness of breath, states it has significantly improved since admission. Denies chest pain, dizziness, palpitations, abdominal pain, n/v/d. Reports leg swelling has improved.    Vital Signs Last 12 Hrs  T(F): 97.6 (04-07-22 @ 12:48), Max: 97.9 (04-07-22 @ 05:07)  HR: 75 (04-07-22 @ 11:00) (68 - 77)  BP: --  BP(mean): --  RR: 20 (04-07-22 @ 11:00) (20 - 38)  SpO2: 94% (04-07-22 @ 11:00) (90% - 95%)  I&O's Summary    06 Apr 2022 07:01  -  07 Apr 2022 07:00  --------------------------------------------------------  IN: 1400 mL / OUT: 3955 mL / NET: -2555 mL    07 Apr 2022 07:01  -  07 Apr 2022 13:15  --------------------------------------------------------  IN: 1464 mL / OUT: 550 mL / NET: 914 mL        PHYSICAL EXAM:  GENERAL: laying in bed comfortably, no acute distress. speaking in full sentences on RA  HEAD:  Atraumatic, Normocephalic  EYES: EOMI, PERRLA, conjunctiva and sclera clear  NECK: Supple, No JVD  NERVOUS SYSTEM:  Alert & Awake.   CHEST/LUNG: B/L good air entry; No rales, rhonchi, or wheezing  HEART: S1S2 normal, no S3, Regular rate and rhythm; No murmurs  ABDOMEN: Soft, Nontender, Nondistended; Bowel sounds present  EXTREMITIES:  2+ Peripheral Pulses, No clubbing, cyanosis, or edema  LYMPH: No lymphadenopathy noted      LABS:                        10.4   10.07 )-----------( 350      ( 07 Apr 2022 05:55 )             29.3     04-07    135  |  101  |  25<H>  ----------------------------<  126<H>  4.3   |  24  |  1.56<H>    Ca    8.0<L>      07 Apr 2022 05:55  Phos  3.9     04-07  Mg     2.4     04-07    TPro  6.2  /  Alb  2.7<L>  /  TBili  0.3  /  DBili  x   /  AST  48<H>  /  ALT  97<H>  /  AlkPhos  53  04-07            RADIOLOGY & ADDITIONAL TESTS:    MEDICATIONS  (STANDING):  aspirin  chewable 81 milliGRAM(s) Oral daily  atorvastatin 20 milliGRAM(s) Oral at bedtime  bictegravir 50 mG/emtricitabine 200 mG/tenofovir alafenamide 25 mG (BIKTARVY) 1 Tablet(s) Oral daily  cefTRIAXone   IVPB 2000 milliGRAM(s) IV Intermittent every 24 hours  chlorhexidine 2% Cloths 1 Application(s) Topical <User Schedule>  clopidogrel Tablet 75 milliGRAM(s) Oral daily  dextrose 5%. 1000 milliLiter(s) (100 mL/Hr) IV Continuous <Continuous>  dextrose 5%. 1000 milliLiter(s) (50 mL/Hr) IV Continuous <Continuous>  dextrose 50% Injectable 25 Gram(s) IV Push once  dextrose 50% Injectable 12.5 Gram(s) IV Push once  dextrose 50% Injectable 25 Gram(s) IV Push once  glucagon  Injectable 1 milliGRAM(s) IntraMuscular once  heparin   Injectable 5000 Unit(s) SubCutaneous every 8 hours  insulin lispro (ADMELOG) corrective regimen sliding scale   SubCutaneous Before meals and at bedtime  metoprolol tartrate 12.5 milliGRAM(s) Oral every 12 hours  pantoprazole    Tablet 40 milliGRAM(s) Oral before breakfast    MEDICATIONS  (PRN):  dextrose Oral Gel 15 Gram(s) Oral once PRN Blood Glucose LESS THAN 70 milliGRAM(s)/deciliter   Transfer from CCU to Cardiac Tele    Hospital Course: 40 y/o M with PMH CAD (MI Jan 2022, s/p 1 stent placed to pLAD, restenosed), hx left heart thrombus (on Xarelto, no longer visualized since 2/2022), HFrEF (EF 10-15% on current admission), HIV (?compliance on Biktarvy), hx colon CA (s/p resection with chemo 2016, PAWAN) BIBEMS for worsening SOB x2 days, admitted for management of AHRF (initially requiring BIPAP) 2/2 bacterial/PCP pneumonia with contribution from CHF exacerbation and Sepsis with likely pulmonary source.  Patient was admitted to CCU for management of AHRF, with improved significantly after diuresis  with Lasix drip and starting treatment for pneumonia with Zosyn, Bactrim (tx dose for PCP), and prednisone.  After initial diuresis patient was clinically euvolemic and diuresis was held for several days.  . Upon admission, vanc/zosyn started for empiric coverage. Lasix gtt @5mg/hr started for volume overload on exam. Decadron discontinued to monitor clinical course on diuretics alone. Respiratory status continued to improved, patient now weaned to 2L NC.  UA+ for small LE, no nitrites. Ucx showing e. coli, however, pt denies any urinary symptoms. Repeat TTE showed EF 10-15%, dilated LV, severely dilated LA, mild/mod TR/MR, and no remaining thrombus. HIV treatment with Biktarvy was resumed. Sputum PCP PCR negative, steroids discontinued, Bactrim changed to prophylaxis dose.  RHC with high filling pressure and normal CO with evidence of pulmonary hypertension.  Originally there was plan to do bronch but that was deferred given the high risk due to poor cardiac function.  Overall patient much improved clinically, has remained hemodynamically stable, stable for step-down to cardiac tele.      SUBJECTIVE:  Patient seen and examined at bedside. Denies shortness of breath, states it has significantly improved since admission. Denies chest pain, dizziness, palpitations, abdominal pain, n/v/d. Reports leg swelling has improved.    Vital Signs Last 12 Hrs  T(F): 97.6 (04-07-22 @ 12:48), Max: 97.9 (04-07-22 @ 05:07)  HR: 75 (04-07-22 @ 11:00) (68 - 77)  BP: --  BP(mean): --  RR: 20 (04-07-22 @ 11:00) (20 - 38)  SpO2: 94% (04-07-22 @ 11:00) (90% - 95%)  I&O's Summary    06 Apr 2022 07:01  -  07 Apr 2022 07:00  --------------------------------------------------------  IN: 1400 mL / OUT: 3955 mL / NET: -2555 mL    07 Apr 2022 07:01  -  07 Apr 2022 13:15  --------------------------------------------------------  IN: 1464 mL / OUT: 550 mL / NET: 914 mL        PHYSICAL EXAM:  GENERAL: laying in bed comfortably, no acute distress. speaking in full sentences on RA  HEAD:  Atraumatic, Normocephalic  EYES: EOMI, PERRLA, conjunctiva and sclera clear  NECK: Supple, No JVD  NERVOUS SYSTEM:  Alert & Awake.   CHEST/LUNG: B/L good air entry; No rales, rhonchi, or wheezing  HEART: S1S2 normal, no S3, Regular rate and rhythm; No murmurs  ABDOMEN: Soft, Nontender, Nondistended; Bowel sounds present  EXTREMITIES:  2+ Peripheral Pulses, No clubbing, cyanosis, or edema    LABS:                        10.4   10.07 )-----------( 350      ( 07 Apr 2022 05:55 )             29.3     04-07    135  |  101  |  25<H>  ----------------------------<  126<H>  4.3   |  24  |  1.56<H>    Ca    8.0<L>      07 Apr 2022 05:55  Phos  3.9     04-07  Mg     2.4     04-07    TPro  6.2  /  Alb  2.7<L>  /  TBili  0.3  /  DBili  x   /  AST  48<H>  /  ALT  97<H>  /  AlkPhos  53  04-07            RADIOLOGY & ADDITIONAL TESTS:    MEDICATIONS  (STANDING):  aspirin  chewable 81 milliGRAM(s) Oral daily  atorvastatin 20 milliGRAM(s) Oral at bedtime  bictegravir 50 mG/emtricitabine 200 mG/tenofovir alafenamide 25 mG (BIKTARVY) 1 Tablet(s) Oral daily  cefTRIAXone   IVPB 2000 milliGRAM(s) IV Intermittent every 24 hours  chlorhexidine 2% Cloths 1 Application(s) Topical <User Schedule>  clopidogrel Tablet 75 milliGRAM(s) Oral daily  dextrose 5%. 1000 milliLiter(s) (100 mL/Hr) IV Continuous <Continuous>  dextrose 5%. 1000 milliLiter(s) (50 mL/Hr) IV Continuous <Continuous>  dextrose 50% Injectable 25 Gram(s) IV Push once  dextrose 50% Injectable 12.5 Gram(s) IV Push once  dextrose 50% Injectable 25 Gram(s) IV Push once  glucagon  Injectable 1 milliGRAM(s) IntraMuscular once  heparin   Injectable 5000 Unit(s) SubCutaneous every 8 hours  insulin lispro (ADMELOG) corrective regimen sliding scale   SubCutaneous Before meals and at bedtime  metoprolol tartrate 12.5 milliGRAM(s) Oral every 12 hours  pantoprazole    Tablet 40 milliGRAM(s) Oral before breakfast    MEDICATIONS  (PRN):  dextrose Oral Gel 15 Gram(s) Oral once PRN Blood Glucose LESS THAN 70 milliGRAM(s)/deciliter

## 2022-04-07 NOTE — DIETITIAN INITIAL EVALUATION ADULT - ADD RECOMMEND
1. Monitor PO intake/appetite, GI distress, diet tolerance, labs, weights.  2. Honor pt food preferences as able.  3. RD to remain available for additional nutrition interventions as needed.   ** Moderate Nutrition Risk.

## 2022-04-07 NOTE — CONSULT NOTE ADULT - ATTENDING COMMENTS
42 YO M with a history of ACC/AHA Stage C ICM (LV 6.2 cm, LVEF 15%), CAD with anterior MI 1/2022 s/p pLAD c/b in in hospital stent thrombosis with inability to revascularize, CKD III (Cr 1.6), HIV with suboptimal control (CD4 < 200), colon cancer s/p resection 2016 with colostomy, prior LV thrombus, and prior tobacco use who was admitted 4/3 with hypoxic respiratory failure due to a combination of acute decompensated heart failure as well as PJP pneumonia. He has been diuresed and treated with bactrim. He underwent RHC with mild-moderately elevated filling pressures and preserved cardiac output    He is mildly overloaded on exam with borderline blood pressures but reassuringly with preserved cardiac output    REVIEW OF STUDIES  TTE 4/4: LV 6.2 cm, LVEF 15%, LVOT VTI 18 cm, mild RV dysfunction, mild-moderate MR/TR, mildly dilated aortic root (measured as 3.7 cm but appears larger) and mildly dilated ascending aorta    RHC 4/7: RA 10, PA 47/25 (32), PCWP 21, Zayra CO/CI 7.1/3.0    PLAN  # Acute on chronic systolic heart failure  -Etiology: due to anterior MI now with infarcted LAD territory, 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 transition metoprolol tartrate to succinate 25 mg daily. was on entresto as outpatient and start ARB/ARNI tomorrow. eventual MRA. SGLT2i as outpatient for HF/CKD if no interaction with HAART  -Diuretics: start torsemide 20 mg daily  -Device: needs reassessment of LVEF after 3 months GDMT  -Advanced therapies: high risk features but not a candidate for advanced therapies at this time due to questionable compliance and poorly controlled HIV with opportunistic infections. will speak to his cardiologist Marlo Kapoor at [facility] to make sure he gets followup with [facility] as all of his care has been at [facility].     # CAD s/p GARFIELD to LAD 1/2022 c/b IST  - On DAPT  - Favor decreasing atorvastatin to 40 mg daily as he has an LDL of 30 and transaminitis. It does not appear BIKTARVY increases statin concentration    # Dilated aorta  - Check RPR for syphilis screening given risk factors     # CKD III  - Cr stable and tolerating bactrim   -SGLT2i as outpatient if no interactions     # PJP   - Being treated with steroids and prednisone    # HIV  - ID following     # Colon cancer s/o colostomy  - Follows with GI surgery as outpatient and being considered for takedown
41M h/o HIV on Biktarvy (reportedly VLUD on Biktarvy), HFrEF, recent MI in 1/2022 p/w fever, SOB, cough x 3 days. He was USOH until 3 days PTA when he started to get progresively SOB, fatigued, and had dry cough, decided to come to the hospital. upon admission, he was febrile to 102.3, tachy, 60%RA and put on BiPAP, BNP 34k, WBC 13.8, lactate 3.8. Started on vanc/CTX, nitro gtt, lasix. Then switched to zosyn. MRSA/MSSA neg, legionella neg. TT5=373. Per patient, HIV dx in 2010, initially on Atripla --> Strabild --> Genvoya --> Biktarvy, always compliant and VLUD, denied prior OI. Currently feeling much better, plan for transition to NC from [facility]. Given concern for possible PCP PNA, ID was called. Exam notable for crackles at b/l bases, pitting edema. Lab as above, CXR severe diffuse b/l dense infiltrate.  Suspect acute on chronic CHF exacerbation with superimposed bacterial PNA. Given reported compliance to ART and rapid improvement with diuresis, I have low suspicion for PCP PNA. His low CD4 count can be due to acute infection.  Cont zosyn, cont empiric Bactrim and steroid for presumed PCP. f/u BCx, f/u fungitell and histo & Crypto ag. Obtain induced sputum with PCP PCR.  Get CT chest. Obtain record from [facility] (CD4 count, VL).     Team 1 will follow you.  Case d/w primary team.    Natividad Dempsey MD, MS  Infectious Disease attending  work cell [phone]   For any questions during evening/weekend/holiday, please page ID on call

## 2022-04-07 NOTE — CONSULT NOTE ADULT - SUBJECTIVE AND OBJECTIVE BOX
HPI:  41M PMH uncontrolled HIV, CKD, colon cancer s/p resection with colostomy, CAD s/p MI and stent in 1/2022 and in-stent-thrombosis during same admission that could not be revascularized, HFrEF with LV thrombus on xarelto presenting with fever and shortness of breath. was admitted to CCU for presumed decompensated HF and was diuresed aggressively. further workup including CT and LDH raised concern for PCP and was started on bactrim. an echo during this admission revealed a dilated LV with severely reduced LV systolic function EF 10% with global hypokinesis. he reports being on metoprolol and entresto at home and sees a cardiologist at [facility]. at present he feels comfortable laying flat, dyspnea improving since admission, denies LE swelling, palpitations or chest pressure/pain.   heart failure consulted for optimization.     PMHx: See above  PSHx: Stent 2022, Colon resection 2016  Meds: atorva 80, biktarvy, plavix, epleronone, escitalopram, entresto  metoprolol succina 25, xarelto 15, torsemide  Allergies: None  Social: see below   (04 Apr 2022 03:08)      ROS: A 10-point review of systems was otherwise negative.    PAST MEDICAL & SURGICAL HISTORY:  Acute myocardial infarction    Acute on chronic systolic congestive heart failure    HIV disease    Colon cancer    Left heart thrombus  on xarelto        SOCIAL HISTORY:  FAMILY HISTORY:  FHx: early MI (Father)    FH: colon cancer    FH: breast cancer    FH: diabetes mellitus      ALLERGIES: 	  No Known Allergies          MEDICATIONS:  aspirin  chewable 81 milliGRAM(s) Oral daily  atorvastatin 20 milliGRAM(s) Oral at bedtime  bictegravir 50 mG/emtricitabine 200 mG/tenofovir alafenamide 25 mG (BIKTARVY) 1 Tablet(s) Oral daily  cefTRIAXone   IVPB 2000 milliGRAM(s) IV Intermittent every 24 hours  chlorhexidine 2% Cloths 1 Application(s) Topical <User Schedule>  clopidogrel Tablet 75 milliGRAM(s) Oral daily  dextrose 5%. 1000 milliLiter(s) IV Continuous <Continuous>  dextrose 5%. 1000 milliLiter(s) IV Continuous <Continuous>  dextrose 50% Injectable 25 Gram(s) IV Push once  dextrose 50% Injectable 12.5 Gram(s) IV Push once  dextrose 50% Injectable 25 Gram(s) IV Push once  dextrose Oral Gel 15 Gram(s) Oral once PRN  glucagon  Injectable 1 milliGRAM(s) IntraMuscular once  heparin   Injectable 5000 Unit(s) SubCutaneous every 8 hours  insulin lispro (ADMELOG) corrective regimen sliding scale   SubCutaneous Before meals and at bedtime  metoprolol tartrate 12.5 milliGRAM(s) Oral every 12 hours  pantoprazole    Tablet 40 milliGRAM(s) Oral before breakfast      PHYSICAL EXAM:  T(C): 36.9 (04-07-22 @ 14:00), Max: 36.9 (04-07-22 @ 14:00)  HR: 75 (04-07-22 @ 14:30) (68 - 80)  BP: 101/66 (04-07-22 @ 14:30) (91/53 - 101/66)  RR: 20 (04-07-22 @ 14:30) (15 - 38)  SpO2: 94% (04-07-22 @ 14:30) (90% - 100%)  Wt(kg): --    GEN: Awake, comfortable laying flat. NAD.   HEENT: NCAT, PERRL, EOMI. Mucosa moist. No JVD.   RESP: dry crackles BL lower lung fields, good air entry otherwise  CV: RRR, normal s1/s2. 2/6 systolic murmur at apex  ABD: Soft, NTND. BS+, colosctomy RLQ  EXT: Warm. No edema, clubbing, or cyanosis.   NEURO: AAOx3. No focal deficits.    I&O's Summary    06 Apr 2022 07:01  -  07 Apr 2022 07:00  --------------------------------------------------------  IN: 1400 mL / OUT: 3955 mL / NET: -2555 mL    07 Apr 2022 07:01  -  07 Apr 2022 15:07  --------------------------------------------------------  IN: 1464 mL / OUT: 550 mL / NET: 914 mL        LABS:	 	                        10.4   10.07 )-----------( 350      ( 07 Apr 2022 05:55 )             29.3     04-07    135  |  101  |  25<H>  ----------------------------<  126<H>  4.3   |  24  |  1.56<H>    Ca    8.0<L>      07 Apr 2022 05:55  Phos  3.9     04-07  Mg     2.4     04-07    TPro  6.2  /  Alb  2.7<L>  /  TBili  0.3  /  DBili  x   /  AST  48<H>  /  ALT  97<H>  /  AlkPhos  53  04-07        < from: TTE Echo Complete w/ Contrast w/ Doppler (04.04.22 @ 12:57) >   1. Dilated left ventriclular size.   2. Severely reduced left ventricular systolic function.   3. Grade III left ventricular diastolic dysfunction.   4. Normal right ventricular size.  5. Reduced right ventricular systolic function.   6. Severely dilated left atrium.   7. Mild-to-moderate mitral regurgitation.   8. Mild-to-moderate tricuspid regurgitation.   9. Pulmonary hypertension present, pulmonary artery systolic pressure is   38 mmHg.  10. Trivial pericardial effusion without echocardiographic evidence of   cardiac tamponade physiology.        c< from: CT Chest No Cont (04.05.22 @ 20:58) >  Findings consistent with PCP pneumonia, COVID 19, ARDS or other atypical   opportunistic infection in this HIV-positive patient.    < end of copied text >

## 2022-04-07 NOTE — PROGRESS NOTE ADULT - PROBLEM SELECTOR PLAN 6
#History of MI s/p PCI  MI in Jan 2022 at [facility], stent placed to pLAD, stent re-stenosed. Home medications Plavix & Xarelto, atorvastatin 80mg. Primary cardiologist is at [facility] (Dr. Saniya Ramirez and Dr. Marlo Kapoor). Lipid panel unremarkable.  - Discontinued home Xarelto 15mg (no longer with LV thrombus, s/p 3mo tx)  - Continue ASA 81mg daily starting tomorrow  - Continue Plavix 75mg daily  - Continue atorvastatin 80mg daily    #H/o LV thrombus  After initial MI in 1/2022, small LV thrombus. Last seen on 1/6. No longer seen on subsequent studies (2/2022). Thrombus not seen on TTE on current admission. Now s/p 3 months of Xarelto.  - Discontinued home Xarelto 15mg (no longer with LV thrombus, s/p 3mo tx)

## 2022-04-07 NOTE — DIETITIAN INITIAL EVALUATION ADULT - OTHER INFO
41M PMH CAD, MI (Jan 2022, stent), Left heart thrombus (Xarelto) CHF, colon cancer (s/p resection 2016), HIV (Biktarvy), former smoker, BIBEMS for worsening shortness of breath[facility] to be in acute respiratory distress with hypoxia. Placed on BiPap. CXR showed bilateral diffuse opacification, enlarged cardiac silhouette hyperinflated lungs with flattened diaphragm. Per ED, EKG sinus tachycardia with left axis deviation. Accepted to CCU for management of sepsis, acute hypoxic respiratory failure likely i/s/o acute heart failure exacerbation. COVID, MRSA nares, Ur legionella/strep neg. Sputum PCP PCR negative. After initial diuresis clinically euvolemic and diuresis was held for several days. Respiratory status continued to improved, weaned to 2L NC.  Repeat TTE showed EF 10-15%, dilated LV, severely dilated LA, mild/mod TR/MR, and no remaining thrombus. RHC with high filling pressure and normal CO with evidence of pulmonary hypertension. Originally there was plan to do bronch but that was deferred given the high risk due to poor cardiac function.     Spoke with pt/RN in CCU, Pending transfer to [facility] today. Good PO intake at this time/PTA. PTA often eats chicken, frozen veggies and pizza x2/week. No issues chewing/swallowing. Ordered now for DASHtlc/Renal diet with 1500ml FR. Good PO remains. Pt reports FR is new for him. Agreeable for diet education today. No pain. Papito 19. 1+BL foot edema. No pressure ulcers, +sacrum skin tear noted. Colostomy bag in place.  Please see below for nutritions recommendations.

## 2022-04-07 NOTE — PROGRESS NOTE ADULT - ASSESSMENT
41M PMH CAD, MI (Jan 2022, stent thrombosed), Left heart thrombus (Xarelto) CHF (unknown EF), colon cancer (PAWAN, s/p resection/chemo 2016), HIV (Biktarvy), former smoker (<1 pack year) BIBEMS for worsening shortness of breath for two days with nonproductive cough, admitted to CCU for acute hypoxic respiratory failure 2/2 PNA vs acute heart failure exacerbation, now clinically euvolemic, being treated for bacterial pneumonia, resuming GDMT for CHF, hemodynamically stable, stable for stepdown to cardiac telemetry.    NEURO  No active issues, AAOX3, neuro intact    CVS  #Acute Decompensated Heart Failure  Patient with HFrEF (EF 10-15%) likely due to ischemic cardiomyopathy. Primary cardiologist is at [facility] (Dr. Saniya Ramirez and Dr. Marlo Kapoor). Home meds: Entresto, torsemide, Toprol XL.  On admission, signs and symptoms of exacerbation diffuse pulmonary edema on XR, volume overloaded on exam with edema, S3, ProBNP 34,267. S/p lasix 80mg and NG drip in ED, briefly on Lasix drip, currently being monitored off diuretics.  TTE (4/4) shows EF 10-15%, no LV thrombus, dilated LV, severely dilated LA, mild/mod TR/MR. RHC with high filling pressure, normal CO, evidence of PH. Today, patient euvolemic to dry on exam, normal output state.   - Currently on Lopressor 12.5 BID, will change to Toporol 25 daily starting tomorrow  - Currently off home Entresto due to low BP, BP now improved so will start Valsartan 20 BID tomorrow   - Patient has been off of diuretics given soft BP, per HF team start Torsemide 20mg daily tomorrow    #CAD  #History of MI s/p PCI  MI in Jan 2022 at [facility], stent placed to [facility], stent re-stenosed. Home medications Plavix & Xarelto, atorvastatin 80mg. Primary cardiologist is at [facility] (Dr. Saniya Ramirez and Dr. Marlo Kapoor). Lipid panel unremarkable.  - Discontinued home Xarelto 15mg (no longer with LV thrombus, s/p 3mo tx)  - Continue ASA 81mg daily starting tomorrow  - Continue Plavix 75mg daily  - Continue atorvastatin 80mg daily    #H/o LV thrombus  After initial MI in 1/2022, small LV thrombus. Last seen on 1/6. No longer seen on subsequent studies (2/2022). Thrombus not seen on TTE on current admission. Now s/p 3 months of Xarelto.  - Discontinued home Xarelto 15mg (no longer with LV thrombus, s/p 3mo tx)    PULM  #Acute Hypoxic Respiratory Failure likely 2/2 CHF exacerbation vs PNA  Patient presented with worsening SOB x2d, found to be in AHRF requiring BIPAP on admission, etiology likely bacterial/PCP pneumonia with some contribution from CHF exacerbation. On admission, febrile initially, elevated LDH, d-dimer, LDH, procal, ESR, CRP. Currently, patient clinically euvolemic, has been afebrile since admission, s/p treatment with Bactrim and prednisone (for presumed PCP pneumonia, stopped after sputum PCP PCR negative on 4/7), currently on Zosyn, was diuresed initially and resuming tomorrow.  AHRF improving overall. CXR with improving bilateral hilar congestion and diffuse bilateral opacities.  CT chest with diffuse severe patchy groundglass opacity throughout the upper and lower lobes with subpleural sparing. Negative COVID, MRSA nares, Ur legionella/strep. s/p CTX in ED and vanc x1 dose.   - Wean O2 as tolerated, currently on 2L  - Start Torsemide tomorrow as above  - Treat pneumonia with Zosyn as below  - Pulmonology  and ID consulted, appreciate recs    #Pneumonia  Patient with bacterial pneumonia, AHRF on admission, BIPAP in ED with improvement in SpO2, subsequently on HFNC, now weaned to room air. On admission, elevated Procal 5.27, .  COVID, MRSA nares, Ur legionella/strep neg. Sputum PCP PCR negative.    - Per ID, discontinued Zosyn on 4/7, continue Ceftriaxone 2g daily until discharge at which time can transition to Cefpodoxime for total 7 day course (4/4-4/10)  - Patient has been on Bactrim treatment dose but since sputum PCP negative, will transition to prophylaxis Bactrim dose on 4/7  - Per recommendation from Pulm and ID, steroids were discontinued today  - F/u sputum cx  - F/u fungitell, TB quant    GI  #Transaminitis  Transaminitis on admission, overall improving, etiology likely chronic HepB (HepB antigen positive with Core IgG) with some contribution acute HF exacerbation with poor perfusion.  - trend CMP    #Colon Cancer s/p Resection  Resection in 2016. Colostomy bag in place. Follows with Dr. Guilherme Travis with [facility]. No active disease.  - outpatient follow-up    RENAL  #KULWINDER vs KULWINDER on CKD  Scr 1.97 admission --> 1.66 4/6 (Unknown baseline) --> 1.56 (4/7), Likely pre-renal as also with lactate, transaminitis in acute heart failure exacerbation. FeUrea pre-renal.  - avoid nephrotoxic medications    HEME  #Leukocytosis  WBC 13.87 , peaked at 16, now improved to 10. Procal 5.2. Leukocytosis likely due to CAP.  - Antibiotic plan, as above    ENDO  A1c 5.2, no PMH of diabetes, on prednisone for empiric PCP tx. -140, hasn't needed SS insulin.  - low ISS  - FSG before meals and at bedtime    ID  #Severe sepsis (RESOLVED)  Patient presented with lactate 3.8, tachypnea, leukocytosis with neutrophilic predominance and fever. Likely due to PNA, unlikely from UTI as denies urinary symptoms. Lactate now cleared. s/p CTX 1gr in ED. s/p vanc x1. RVP, COVID, MRSA nares negative. UCx with e.coli. Bcx NGTD  - Treat pneumonia as above    #HIV  Febrile on admission. PMH HIV (dx 2010). States compliance on Biktarvy. Per pt, last sexual encounter was over 1 year ago. Follows at [facility].  - current CD4 178, VL 50,732  - per collateral, previous CD4 671,  (12/2021)  - c/w home Biktarvy    #Asymptomatic e. coli bacteriuria  Denies dysuria, urinary frequency/urgency. No suprapubic/CVA tenderness. Urine: small LE, neg nitrites. WBC>10. Ucx shows e. coli >100k.  - on abx for PNA, as above    PROPHYLAXIS  Fluids: None  Electrolytes: replete as necessary, K>4, Mg>2  Nutrition: DASH  Bowel Regimen: None  DVT ppx: Heparin sub-q  GI ppx: Pantoprazole 40mg IV   Code: Full  Disposition: cardiac telemetry 41M PMH CAD, MI (Jan 2022, stent thrombosed), Left heart thrombus (Xarelto) CHF (unknown EF), colon cancer (PAWAN, s/p resection/chemo 2016), HIV (Biktarvy), former smoker (<1 pack year) BIBEMS for worsening shortness of breath for two days with nonproductive cough, admitted to CCU for acute hypoxic respiratory failure 2/2 PNA vs acute heart failure exacerbation, now clinically euvolemic, being treated for bacterial pneumonia, resuming GDMT for CHF, hemodynamically stable, stable for stepdown to cardiac telemetry.    NEURO  No active issues, AAOX3, neuro intact    CVS  #Acute Decompensated Heart Failure  Patient with HFrEF (EF 10-15%) likely due to ischemic cardiomyopathy. Primary cardiologist is at [facility] (Dr. Saniya Ramirez and Dr. Marlo Kapoor). Home meds: Entresto, torsemide, Toprol XL.  On admission, signs and symptoms of exacerbation diffuse pulmonary edema on XR, volume overloaded on exam with edema, S3, ProBNP 34,267. S/p lasix 80mg and NG drip in ED, briefly on Lasix drip, currently being monitored off diuretics.  TTE (4/4) shows EF 10-15%, no LV thrombus, dilated LV, severely dilated LA, mild/mod TR/MR. RHC with high filling pressure, normal CO, evidence of PH. Today, patient euvolemic to dry on exam, normal output state.   - Currently on Lopressor 12.5 BID, will change to Toporol 25 daily starting tomorrow  - Currently off home Entresto due to low BP, BP now improved so will start Valsartan 20 BID tomorrow   - Patient has been off of diuretics given soft BP, per HF team start Torsemide 20mg daily tomorrow  - Advanced HF team following, appreciate recs.    #CAD  #History of MI s/p PCI  MI in Jan 2022 at [facility], stent placed to [facility], stent re-stenosed. Home medications Plavix & Xarelto, atorvastatin 80mg. Primary cardiologist is at [facility] (Dr. Saniya Ramirez and Dr. Marlo Kapoor). Lipid panel unremarkable.  - Discontinued home Xarelto 15mg (no longer with LV thrombus, s/p 3mo tx)  - Continue ASA 81mg daily starting tomorrow  - Continue Plavix 75mg daily  - Continue atorvastatin 80mg daily    #H/o LV thrombus  After initial MI in 1/2022, small LV thrombus. Last seen on 1/6. No longer seen on subsequent studies (2/2022). Thrombus not seen on TTE on current admission. Now s/p 3 months of Xarelto.  - Discontinued home Xarelto 15mg (no longer with LV thrombus, s/p 3mo tx)    PULM  #Acute Hypoxic Respiratory Failure likely 2/2 CHF exacerbation vs PNA  Patient presented with worsening SOB x2d, found to be in AHRF requiring BIPAP on admission, etiology likely bacterial/PCP pneumonia with some contribution from CHF exacerbation. On admission, febrile initially, elevated LDH, d-dimer, LDH, procal, ESR, CRP. Currently, patient clinically euvolemic, has been afebrile since admission, s/p treatment with Bactrim and prednisone (for presumed PCP pneumonia, stopped after sputum PCP PCR negative on 4/7), s/p Zosyn (4/4 -4/7) and now on Ceftriaxone, was diuresed initially and resuming tomorrow.  AHRF improving overall. CXR with improving bilateral hilar congestion and diffuse bilateral opacities.  CT chest with diffuse severe patchy groundglass opacity throughout the upper and lower lobes with subpleural sparing. Negative COVID, MRSA nares, Ur legionella/strep. s/p CTX in ED and vanc x1 dose.   - Wean O2 as tolerated, currently on 2L  - Treat CHF as above  - Treat pneumonia as below  - Pulmonology  and ID consulted, appreciate recs    #Pneumonia  Patient with bacterial pneumonia, AHRF on admission, BIPAP in ED with improvement in SpO2, subsequently on HFNC, now weaned to room air. On admission, elevated Procal 5.27, .  COVID, MRSA nares, Ur legionella/strep neg. Sputum PCP PCR negative.  Crypto antigen negative.  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  - trend CMP    #Colon Cancer s/p Resection  Resection in 2016. Colostomy bag in place. Follows with Dr. Guilherme Travis with [facility]. No active disease.  - outpatient follow-up    RENAL  #KULWINDER vs KULWINDER on CKD  Scr 1.97 admission --> 1.66 4/6 (Unknown baseline) --> 1.56 (4/7), Likely pre-renal as also with lactate, transaminitis in acute heart failure exacerbation. FeUrea pre-renal.  - avoid nephrotoxic medications    HEME  #Leukocytosis  WBC 13.87 , peaked at 16, now improved to 10. Procal 5.2. Leukocytosis likely due to CAP.  - Antibiotic plan, as above    ENDO  A1c 5.2, no PMH of diabetes, on prednisone for empiric PCP tx. -140, hasn't needed SS insulin.  - low ISS  - FSG before meals and at bedtime    ID  #Severe sepsis (RESOLVED)  Patient presented with lactate 3.8, tachypnea, leukocytosis with neutrophilic predominance and fever. Likely due to PNA, unlikely from UTI as denies urinary symptoms. Lactate now cleared. s/p CTX 1gr in ED. s/p vanc x1. RVP, COVID, MRSA nares negative. UCx with e.coli. Bcx NGTD  - Treat pneumonia as above    #HIV  Febrile on admission. PMH HIV (dx 2010). States compliance on Biktarvy. Per pt, last sexual encounter was over 1 year ago. Follows at [facility].  - current CD4 178, VL 50,732  - per collateral, previous CD4 671,  (12/2021)  - c/w home Biktarvy    #Asymptomatic e. coli bacteriuria  Denies dysuria, urinary frequency/urgency. No suprapubic/CVA tenderness. Urine: small LE, neg nitrites. WBC>10. Ucx shows e. coli >100k.  - on abx for PNA, as above    PROPHYLAXIS  Fluids: None  Electrolytes: replete as necessary, K>4, Mg>2  Nutrition: DASH  Bowel Regimen: None  DVT ppx: Heparin sub-q  GI ppx: Pantoprazole 40mg IV   Code: Full  Disposition: cardiac telemetry

## 2022-04-08 VITALS — TEMPERATURE: 98 F

## 2022-04-08 LAB
ALBUMIN SERPL ELPH-MCNC: 2.8 G/DL — LOW (ref 3.3–5)
ALP SERPL-CCNC: 62 U/L — SIGNIFICANT CHANGE UP (ref 40–120)
ALT FLD-CCNC: 97 U/L — HIGH (ref 10–45)
ANION GAP SERPL CALC-SCNC: 10 MMOL/L — SIGNIFICANT CHANGE UP (ref 5–17)
AST SERPL-CCNC: 38 U/L — SIGNIFICANT CHANGE UP (ref 10–40)
BASOPHILS # BLD AUTO: 0.02 K/UL — SIGNIFICANT CHANGE UP (ref 0–0.2)
BASOPHILS NFR BLD AUTO: 0.2 % — SIGNIFICANT CHANGE UP (ref 0–2)
BILIRUB SERPL-MCNC: 0.3 MG/DL — SIGNIFICANT CHANGE UP (ref 0.2–1.2)
BUN SERPL-MCNC: 27 MG/DL — HIGH (ref 7–23)
CALCIUM SERPL-MCNC: 8.5 MG/DL — SIGNIFICANT CHANGE UP (ref 8.4–10.5)
CHLORIDE SERPL-SCNC: 103 MMOL/L — SIGNIFICANT CHANGE UP (ref 96–108)
CO2 SERPL-SCNC: 21 MMOL/L — LOW (ref 22–31)
CREAT SERPL-MCNC: 1.64 MG/DL — HIGH (ref 0.5–1.3)
EGFR: 53 ML/MIN/1.73M2 — LOW
EOSINOPHIL # BLD AUTO: 0.01 K/UL — SIGNIFICANT CHANGE UP (ref 0–0.5)
EOSINOPHIL NFR BLD AUTO: 0.1 % — SIGNIFICANT CHANGE UP (ref 0–6)
G6PD RBC-CCNC: 19.8 U/G HGB — SIGNIFICANT CHANGE UP (ref 7–20.5)
GALACTOMANNAN AG SERPL-ACNC: 0.03 INDEX — SIGNIFICANT CHANGE UP (ref 0–0.49)
GLUCOSE BLDC GLUCOMTR-MCNC: 81 MG/DL — SIGNIFICANT CHANGE UP (ref 70–99)
GLUCOSE BLDC GLUCOMTR-MCNC: 88 MG/DL — SIGNIFICANT CHANGE UP (ref 70–99)
GLUCOSE SERPL-MCNC: 81 MG/DL — SIGNIFICANT CHANGE UP (ref 70–99)
HCT VFR BLD CALC: 32.8 % — LOW (ref 39–50)
HGB BLD-MCNC: 11.5 G/DL — LOW (ref 13–17)
IMM GRANULOCYTES NFR BLD AUTO: 1.5 % — SIGNIFICANT CHANGE UP (ref 0–1.5)
LYMPHOCYTES # BLD AUTO: 2.48 K/UL — SIGNIFICANT CHANGE UP (ref 1–3.3)
LYMPHOCYTES # BLD AUTO: 21.9 % — SIGNIFICANT CHANGE UP (ref 13–44)
MAGNESIUM SERPL-MCNC: 2.2 MG/DL — SIGNIFICANT CHANGE UP (ref 1.6–2.6)
MCHC RBC-ENTMCNC: 28 PG — SIGNIFICANT CHANGE UP (ref 27–34)
MCHC RBC-ENTMCNC: 35.1 GM/DL — SIGNIFICANT CHANGE UP (ref 32–36)
MCV RBC AUTO: 80 FL — SIGNIFICANT CHANGE UP (ref 80–100)
MONOCYTES # BLD AUTO: 0.82 K/UL — SIGNIFICANT CHANGE UP (ref 0–0.9)
MONOCYTES NFR BLD AUTO: 7.3 % — SIGNIFICANT CHANGE UP (ref 2–14)
NEUTROPHILS # BLD AUTO: 7.8 K/UL — HIGH (ref 1.8–7.4)
NEUTROPHILS NFR BLD AUTO: 69 % — SIGNIFICANT CHANGE UP (ref 43–77)
NRBC # BLD: 0 /100 WBCS — SIGNIFICANT CHANGE UP (ref 0–0)
PHOSPHATE SERPL-MCNC: 3.8 MG/DL — SIGNIFICANT CHANGE UP (ref 2.5–4.5)
PLATELET # BLD AUTO: 429 K/UL — HIGH (ref 150–400)
POTASSIUM SERPL-MCNC: 4.3 MMOL/L — SIGNIFICANT CHANGE UP (ref 3.5–5.3)
POTASSIUM SERPL-SCNC: 4.3 MMOL/L — SIGNIFICANT CHANGE UP (ref 3.5–5.3)
PROT SERPL-MCNC: 6.4 G/DL — SIGNIFICANT CHANGE UP (ref 6–8.3)
RBC # BLD: 4.1 M/UL — LOW (ref 4.2–5.8)
RBC # FLD: 15.9 % — HIGH (ref 10.3–14.5)
SODIUM SERPL-SCNC: 134 MMOL/L — LOW (ref 135–145)
WBC # BLD: 11.3 K/UL — HIGH (ref 3.8–10.5)
WBC # FLD AUTO: 11.3 K/UL — HIGH (ref 3.8–10.5)

## 2022-04-08 PROCEDURE — 83540 ASSAY OF IRON: CPT

## 2022-04-08 PROCEDURE — 87040 BLOOD CULTURE FOR BACTERIA: CPT

## 2022-04-08 PROCEDURE — 83615 LACTATE (LD) (LDH) ENZYME: CPT

## 2022-04-08 PROCEDURE — 87517 HEPATITIS B DNA QUANT: CPT

## 2022-04-08 PROCEDURE — 83605 ASSAY OF LACTIC ACID: CPT

## 2022-04-08 PROCEDURE — 86850 RBC ANTIBODY SCREEN: CPT

## 2022-04-08 PROCEDURE — 83930 ASSAY OF BLOOD OSMOLALITY: CPT

## 2022-04-08 PROCEDURE — 99291 CRITICAL CARE FIRST HOUR: CPT | Mod: 25

## 2022-04-08 PROCEDURE — 87086 URINE CULTURE/COLONY COUNT: CPT

## 2022-04-08 PROCEDURE — 71045 X-RAY EXAM CHEST 1 VIEW: CPT

## 2022-04-08 PROCEDURE — C1769: CPT

## 2022-04-08 PROCEDURE — 85027 COMPLETE CBC AUTOMATED: CPT

## 2022-04-08 PROCEDURE — 93306 TTE W/DOPPLER COMPLETE: CPT

## 2022-04-08 PROCEDURE — 84466 ASSAY OF TRANSFERRIN: CPT

## 2022-04-08 PROCEDURE — 86140 C-REACTIVE PROTEIN: CPT

## 2022-04-08 PROCEDURE — 80048 BASIC METABOLIC PNL TOTAL CA: CPT

## 2022-04-08 PROCEDURE — 85025 COMPLETE CBC W/AUTO DIFF WBC: CPT

## 2022-04-08 PROCEDURE — 85379 FIBRIN DEGRADATION QUANT: CPT

## 2022-04-08 PROCEDURE — 82803 BLOOD GASES ANY COMBINATION: CPT

## 2022-04-08 PROCEDURE — 87340 HEPATITIS B SURFACE AG IA: CPT

## 2022-04-08 PROCEDURE — 87536 HIV-1 QUANT&REVRSE TRNSCRPJ: CPT

## 2022-04-08 PROCEDURE — 87449 NOS EACH ORGANISM AG IA: CPT

## 2022-04-08 PROCEDURE — 87305 ASPERGILLUS AG IA: CPT

## 2022-04-08 PROCEDURE — 93970 EXTREMITY STUDY: CPT

## 2022-04-08 PROCEDURE — 84540 ASSAY OF URINE/UREA-N: CPT

## 2022-04-08 PROCEDURE — 93308 TTE F-UP OR LMTD: CPT

## 2022-04-08 PROCEDURE — 83036 HEMOGLOBIN GLYCOSYLATED A1C: CPT

## 2022-04-08 PROCEDURE — 86704 HEP B CORE ANTIBODY TOTAL: CPT

## 2022-04-08 PROCEDURE — 94660 CPAP INITIATION&MGMT: CPT

## 2022-04-08 PROCEDURE — 87385 HISTOPLASMA CAPSUL AG IA: CPT

## 2022-04-08 PROCEDURE — 87491 CHLMYD TRACH DNA AMP PROBE: CPT

## 2022-04-08 PROCEDURE — 86706 HEP B SURFACE ANTIBODY: CPT

## 2022-04-08 PROCEDURE — 82962 GLUCOSE BLOOD TEST: CPT

## 2022-04-08 PROCEDURE — U0003: CPT

## 2022-04-08 PROCEDURE — 86355 B CELLS TOTAL COUNT: CPT

## 2022-04-08 PROCEDURE — 84145 PROCALCITONIN (PCT): CPT

## 2022-04-08 PROCEDURE — 86357 NK CELLS TOTAL COUNT: CPT

## 2022-04-08 PROCEDURE — 85610 PROTHROMBIN TIME: CPT

## 2022-04-08 PROCEDURE — U0005: CPT

## 2022-04-08 PROCEDURE — 86593 SYPHILIS TEST NON-TREP QUANT: CPT

## 2022-04-08 PROCEDURE — 87522 HEPATITIS C REVRS TRNSCRPJ: CPT

## 2022-04-08 PROCEDURE — 93005 ELECTROCARDIOGRAM TRACING: CPT

## 2022-04-08 PROCEDURE — 83935 ASSAY OF URINE OSMOLALITY: CPT

## 2022-04-08 PROCEDURE — 86780 TREPONEMA PALLIDUM: CPT

## 2022-04-08 PROCEDURE — 96374 THER/PROPH/DIAG INJ IV PUSH: CPT

## 2022-04-08 PROCEDURE — 84300 ASSAY OF URINE SODIUM: CPT

## 2022-04-08 PROCEDURE — C1887: CPT

## 2022-04-08 PROCEDURE — 87640 STAPH A DNA AMP PROBE: CPT

## 2022-04-08 PROCEDURE — 87637 SARSCOV2&INF A&B&RSV AMP PRB: CPT

## 2022-04-08 PROCEDURE — 99232 SBSQ HOSP IP/OBS MODERATE 35: CPT | Mod: GC

## 2022-04-08 PROCEDURE — 99239 HOSP IP/OBS DSCHRG MGMT >30: CPT

## 2022-04-08 PROCEDURE — 71250 CT THORAX DX C-: CPT

## 2022-04-08 PROCEDURE — 86900 BLOOD TYPING SEROLOGIC ABO: CPT

## 2022-04-08 PROCEDURE — 86359 T CELLS TOTAL COUNT: CPT

## 2022-04-08 PROCEDURE — 86707 HEPATITIS BE ANTIBODY: CPT

## 2022-04-08 PROCEDURE — 87186 SC STD MICRODIL/AGAR DIL: CPT

## 2022-04-08 PROCEDURE — 36415 COLL VENOUS BLD VENIPUNCTURE: CPT

## 2022-04-08 PROCEDURE — 82553 CREATINE MB FRACTION: CPT

## 2022-04-08 PROCEDURE — 80061 LIPID PANEL: CPT

## 2022-04-08 PROCEDURE — 97161 PT EVAL LOW COMPLEX 20 MIN: CPT

## 2022-04-08 PROCEDURE — 87899 AGENT NOS ASSAY W/OPTIC: CPT

## 2022-04-08 PROCEDURE — 87591 N.GONORRHOEAE DNA AMP PROB: CPT

## 2022-04-08 PROCEDURE — 96375 TX/PRO/DX INJ NEW DRUG ADDON: CPT

## 2022-04-08 PROCEDURE — 94640 AIRWAY INHALATION TREATMENT: CPT

## 2022-04-08 PROCEDURE — 82550 ASSAY OF CK (CPK): CPT

## 2022-04-08 PROCEDURE — 87594 PNEUMCYSTS JIROVECII AMP PRB: CPT

## 2022-04-08 PROCEDURE — 80074 ACUTE HEPATITIS PANEL: CPT

## 2022-04-08 PROCEDURE — 84100 ASSAY OF PHOSPHORUS: CPT

## 2022-04-08 PROCEDURE — C1894: CPT

## 2022-04-08 PROCEDURE — 84484 ASSAY OF TROPONIN QUANT: CPT

## 2022-04-08 PROCEDURE — C8929: CPT

## 2022-04-08 PROCEDURE — 86592 SYPHILIS TEST NON-TREP QUAL: CPT

## 2022-04-08 PROCEDURE — 83550 IRON BINDING TEST: CPT

## 2022-04-08 PROCEDURE — 86403 PARTICLE AGGLUT ANTBDY SCRN: CPT

## 2022-04-08 PROCEDURE — 82955 ASSAY OF G6PD ENZYME: CPT

## 2022-04-08 PROCEDURE — 83735 ASSAY OF MAGNESIUM: CPT

## 2022-04-08 PROCEDURE — 85652 RBC SED RATE AUTOMATED: CPT

## 2022-04-08 PROCEDURE — 80076 HEPATIC FUNCTION PANEL: CPT

## 2022-04-08 PROCEDURE — 82570 ASSAY OF URINE CREATININE: CPT

## 2022-04-08 PROCEDURE — 85730 THROMBOPLASTIN TIME PARTIAL: CPT

## 2022-04-08 PROCEDURE — 82728 ASSAY OF FERRITIN: CPT

## 2022-04-08 PROCEDURE — 86481 TB AG RESPONSE T-CELL SUSP: CPT

## 2022-04-08 PROCEDURE — 86901 BLOOD TYPING SEROLOGIC RH(D): CPT

## 2022-04-08 PROCEDURE — 86360 T CELL ABSOLUTE COUNT/RATIO: CPT

## 2022-04-08 PROCEDURE — 0225U NFCT DS DNA&RNA 21 SARSCOV2: CPT

## 2022-04-08 PROCEDURE — 81001 URINALYSIS AUTO W/SCOPE: CPT

## 2022-04-08 PROCEDURE — 83880 ASSAY OF NATRIURETIC PEPTIDE: CPT

## 2022-04-08 PROCEDURE — 87350 HEPATITIS BE AG IA: CPT

## 2022-04-08 PROCEDURE — 80053 COMPREHEN METABOLIC PANEL: CPT

## 2022-04-08 PROCEDURE — 87641 MR-STAPH DNA AMP PROBE: CPT

## 2022-04-08 RX ORDER — ROSUVASTATIN CALCIUM 5 MG/1
1 TABLET ORAL
Qty: 30 | Refills: 3
Start: 2022-04-08 | End: 2022-08-05

## 2022-04-08 RX ORDER — METOPROLOL TARTRATE 50 MG
1 TABLET ORAL
Qty: 30 | Refills: 3
Start: 2022-04-08 | End: 2022-08-05

## 2022-04-08 RX ORDER — FONDAPARINUX SODIUM 2.5 MG/.5ML
1 INJECTION, SOLUTION SUBCUTANEOUS
Qty: 0 | Refills: 0 | DISCHARGE

## 2022-04-08 RX ORDER — METOPROLOL TARTRATE 50 MG
1 TABLET ORAL
Qty: 0 | Refills: 0 | DISCHARGE

## 2022-04-08 RX ORDER — ASPIRIN/CALCIUM CARB/MAGNESIUM 324 MG
1 TABLET ORAL
Qty: 0 | Refills: 0 | DISCHARGE
Start: 2022-04-08

## 2022-04-08 RX ORDER — ATORVASTATIN CALCIUM 80 MG/1
1 TABLET, FILM COATED ORAL
Qty: 0 | Refills: 0 | DISCHARGE

## 2022-04-08 RX ORDER — ATORVASTATIN CALCIUM 80 MG/1
40 TABLET, FILM COATED ORAL AT BEDTIME
Refills: 0 | Status: DISCONTINUED | OUTPATIENT
Start: 2022-04-08 | End: 2022-04-08

## 2022-04-08 RX ORDER — SACUBITRIL AND VALSARTAN 24; 26 MG/1; MG/1
1 TABLET, FILM COATED ORAL
Qty: 60 | Refills: 3
Start: 2022-04-08 | End: 2022-08-05

## 2022-04-08 RX ORDER — SACUBITRIL AND VALSARTAN 24; 26 MG/1; MG/1
1 TABLET, FILM COATED ORAL
Qty: 0 | Refills: 0 | DISCHARGE

## 2022-04-08 RX ORDER — SACUBITRIL AND VALSARTAN 24; 26 MG/1; MG/1
1 TABLET, FILM COATED ORAL EVERY 12 HOURS
Refills: 0 | Status: DISCONTINUED | OUTPATIENT
Start: 2022-04-08 | End: 2022-04-08

## 2022-04-08 RX ADMIN — Medication 25 MILLIGRAM(S): at 07:02

## 2022-04-08 RX ADMIN — CHLORHEXIDINE GLUCONATE 1 APPLICATION(S): 213 SOLUTION TOPICAL at 05:14

## 2022-04-08 RX ADMIN — CLOPIDOGREL BISULFATE 75 MILLIGRAM(S): 75 TABLET, FILM COATED ORAL at 12:05

## 2022-04-08 RX ADMIN — Medication 1 TABLET(S): at 12:05

## 2022-04-08 RX ADMIN — Medication 81 MILLIGRAM(S): at 12:04

## 2022-04-08 RX ADMIN — PANTOPRAZOLE SODIUM 40 MILLIGRAM(S): 20 TABLET, DELAYED RELEASE ORAL at 06:02

## 2022-04-08 RX ADMIN — SACUBITRIL AND VALSARTAN 1 TABLET(S): 24; 26 TABLET, FILM COATED ORAL at 10:33

## 2022-04-08 RX ADMIN — HEPARIN SODIUM 5000 UNIT(S): 5000 INJECTION INTRAVENOUS; SUBCUTANEOUS at 05:13

## 2022-04-08 RX ADMIN — BICTEGRAVIR SODIUM, EMTRICITABINE, AND TENOFOVIR ALAFENAMIDE FUMARATE 1 TABLET(S): 30; 120; 15 TABLET ORAL at 12:05

## 2022-04-08 RX ADMIN — Medication 20 MILLIGRAM(S): at 13:48

## 2022-04-08 NOTE — DISCHARGE NOTE PROVIDER - CARE PROVIDER_API CALL
Marlo Kapoor  [address] [place] [address] 10th floor  Phone: [phone]  Fax: (   )    -  Scheduled Appointment: 04/19/2022 02:00 PM

## 2022-04-08 NOTE — DISCHARGE NOTE PROVIDER - NSDCCPCAREPLAN_GEN_ALL_CORE_FT
PRINCIPAL DISCHARGE DIAGNOSIS  Diagnosis: Acute exacerbation of CHF (congestive heart failure)  Assessment and Plan of Treatment: You were admitted to the hospital due to an exacerbation of your heart failure. You have reduced pumping function of your heart. To help with your symptoms, you were on IV diuretics, which increased your urination of the excess fluids. To help control your symptoms, we recommend you continue your Entresto twice a day, continue Torsemide 20mg, as well as metoprolol succinate 25mg daily. Please follow up with your Cardiologist, Dr. Marlo Kapoor on 4/19 at 2PM as scheduled.      SECONDARY DISCHARGE DIAGNOSES  Diagnosis: CAD (coronary artery disease)  Assessment and Plan of Treatment: You recently had a stent placed in January 2022. For your stent,     PRINCIPAL DISCHARGE DIAGNOSIS  Diagnosis: Acute exacerbation of CHF (congestive heart failure)  Assessment and Plan of Treatment: You were admitted to the hospital due to an exacerbation of your heart failure. You have reduced pumping function of your heart. To help with your symptoms, you were on IV diuretics, which increased your urination of the excess fluids. To help control your symptoms, we recommend you continue your Entresto twice a day, continue Torsemide 20mg, as well as metoprolol succinate 25mg daily. Please follow up with your Cardiologist, Dr. Marlo Kapoor on 4/19 at 2PM as scheduled.      SECONDARY DISCHARGE DIAGNOSES  Diagnosis: CAD (coronary artery disease)  Assessment and Plan of Treatment: You recently had a stent placed in January 2022. For your stent, you have been taking Plavix. For your thrombus (blood clot) that was intiially found in your heart, you were taking Xarelto. On repeat imaging of your heart, there no longer is a blood clot in your lung. We recommend you DISCONTINUE XARELTO AND TAKE ASPIRIN 81MG, AS WELL AS PLAVIX DAILY. Please follow up with your Cardiologist.    Diagnosis: HIV disease  Assessment and Plan of Treatment: During your admission, there was concern that you had an opportunistic infection causing your pneumonia. You initially were being treated with steroids and Bactrim for PCP pneumonia, however, your sputum was negative for this bacteria. On discharge, we recommend you continue your Biktarvy daily, as well as take Bactrim daily, to help reduce your risk of infections that can be associated with uncontrolled HIV. Please follow up with your HIV specialist for continued monitoring and management.     PRINCIPAL DISCHARGE DIAGNOSIS  Diagnosis: Acute exacerbation of CHF (congestive heart failure)  Assessment and Plan of Treatment: You were admitted to the hospital due to an exacerbation of your heart failure. You have reduced pumping function of your heart. To help with your symptoms, you were on IV diuretics, which increased your urination of the excess fluids. To help control your symptoms, we recommend you continue your Entresto twice a day, continue Torsemide 20mg, as well as metoprolol succinate 25mg daily. Please follow up with your Cardiologist, Dr. Marlo Kapoor on 4/19 at 2PM as scheduled.  -Please weigh yourself daily: if you have gained more than 2-3 lbs in one day or 5 lbs in one week contact your doctor immediately as you may be retaining water weight  -In addition, restrict your salt intake to less than 2 grams a day  -If you develop worsening shortening of breath, leg swelling, fatigue, chest pain, difficulty sleeping at night due to shortness of breath, contact your cardiologist immediately.        SECONDARY DISCHARGE DIAGNOSES  Diagnosis: CAD (coronary artery disease)  Assessment and Plan of Treatment: You recently had a stent placed in January 2022. For your stent, you have been taking Plavix. For your thrombus (blood clot) that was intiially found in your heart, you were taking Xarelto. On repeat imaging of your heart, there no longer is a blood clot in your lung. We recommend you DISCONTINUE XARELTO AND TAKE ASPIRIN 81MG, AS WELL AS PLAVIX DAILY. Please follow up with your Cardiologist.    Diagnosis: HIV disease  Assessment and Plan of Treatment: During your admission, there was concern that you had an opportunistic infection causing your pneumonia. You initially were being treated with steroids and Bactrim for PCP pneumonia, however, your sputum was negative for this bacteria. On discharge, we recommend you continue your Biktarvy daily, as well as take Bactrim daily, to help reduce your risk of infections that can be associated with uncontrolled HIV. Please follow up with your HIV specialist for continued monitoring and management.     PRINCIPAL DISCHARGE DIAGNOSIS  Diagnosis: Acute exacerbation of CHF (congestive heart failure)  Assessment and Plan of Treatment: You were admitted to the hospital due to an exacerbation of your heart failure. You have reduced pumping function of your heart. To help with your symptoms, you were on IV diuretics, which increased your urination of the excess fluids. To help control your symptoms, we recommend you continue your Entresto twice a day, continue Torsemide 20mg, as well as metoprolol succinate 25mg daily. Please follow up with your Cardiologist, Dr. Marlo Kapoor on 4/19 at 2PM as scheduled.  -Please weigh yourself daily: if you have gained more than 2-3 lbs in one day or 5 lbs in one week contact your doctor immediately as you may be retaining water weight  -In addition, restrict your salt intake to less than 2 grams a day  -If you develop worsening shortening of breath, leg swelling, fatigue, chest pain, difficulty sleeping at night due to shortness of breath, contact your cardiologist immediately.        SECONDARY DISCHARGE DIAGNOSES  Diagnosis: CAD (coronary artery disease)  Assessment and Plan of Treatment: You recently had a stent placed in January 2022. For your stent, you have been taking Plavix. For your thrombus (blood clot) that was intiially found in your heart, you were taking Xarelto. On repeat imaging of your heart, there no longer is a blood clot in your lung. We recommend you DISCONTINUE XARELTO AND TAKE ASPIRIN 81MG, AS WELL AS PLAVIX DAILY. Please follow up with your Cardiologist.  Discontinue Atorvastatin and transition to Crestor 20mg daily.    Diagnosis: HIV disease  Assessment and Plan of Treatment: During your admission, there was concern that you had an opportunistic infection causing your pneumonia. You initially were being treated with steroids and Bactrim for PCP pneumonia, however, your sputum was negative for this bacteria. On discharge, we recommend you continue your Biktarvy daily, as well as take Bactrim daily, to help reduce your risk of infections that can be associated with uncontrolled HIV. Please follow up with your HIV specialist for continued monitoring and management.

## 2022-04-08 NOTE — PROGRESS NOTE ADULT - ASSESSMENT
41M PMH uncontrolled HIV, CKD, colon cancer s/p resection with colostomy, CAD s/p MI and stent in 1/2022 and in-stent-thrombosis during same admission that could not be revascularized, HFrEF with LV thrombus on xarelto presenting with fever and shortness of breath found to have PCP pneumonia with likely some component of HF exacerbation that improved with IV diuretics.     #chronic systolic HFrEF 10% with dialted LV, GIIIDD, well compensated, euvolemic  Etiology: likely mixed ischemic d/t LAD IST and transmural infarct in combination with uncontrolled HIV  GDMT - Toprol 25mg daily, entresto 24/26mg BID, MRA/SGLT2 as outpatient  Diuretics - continue torsemide 20mg PO daily  Device: he is 3 months since his MI, however not yet optimized on GDMT. once optimized medically will obtain repeat echo and assess candidacy for ICD    current medical regimen should be continued on discharge and he will follow up with his cardiologist at [facility]    HF service will sing off at this time, please call with questions or change in clinical status

## 2022-04-08 NOTE — DISCHARGE NOTE NURSING/CASE MANAGEMENT/SOCIAL WORK - NSDCPEFALRISK_GEN_ALL_CORE
For information on Fall & Injury Prevention, visit: [email or web address] OR  [email or web address] OR  https://www.cdc.gov/steadi/patient.html

## 2022-04-08 NOTE — DISCHARGE NOTE PROVIDER - NSDCMRMEDTOKEN_GEN_ALL_CORE_FT
aspirin 81 mg oral tablet, chewable: 1 tab(s) orally once a day  atorvastatin 80 mg oral tablet: 1 tab(s) orally once a day  Biktarvy 50 mg-200 mg-25 mg oral tablet: 1 tab(s) orally once a day  Entresto 24 mg-26 mg oral tablet: 1 tab(s) orally 2 times a day  Plavix 75 mg oral tablet: 1 tab(s) orally once a day  sulfamethoxazole-trimethoprim 800 mg-160 mg oral tablet: 1 tab(s) orally once a day  Toprol-XL 25 mg oral tablet, extended release: 1 tab(s) orally once a day  torsemide 20 mg oral tablet: 1 tab(s) orally once a day   aspirin 81 mg oral tablet, chewable: 1 tab(s) orally once a day  atorvastatin 80 mg oral tablet: 1 tab(s) orally once a day  Biktarvy 50 mg-200 mg-25 mg oral tablet: 1 tab(s) orally once a day  Cardiac Rehab:   Entresto 24 mg-26 mg oral tablet: 1 tab(s) orally 2 times a day  Plavix 75 mg oral tablet: 1 tab(s) orally once a day  sulfamethoxazole-trimethoprim 800 mg-160 mg oral tablet: 1 tab(s) orally once a day  Toprol-XL 25 mg oral tablet, extended release: 1 tab(s) orally once a day  torsemide 20 mg oral tablet: 1 tab(s) orally once a day   aspirin 81 mg oral tablet, chewable: 1 tab(s) orally once a day  Biktarvy 50 mg-200 mg-25 mg oral tablet: 1 tab(s) orally once a day  Cardiac Rehab:   Crestor 20 mg oral tablet: 1 tab(s) orally once a day (at bedtime)   Entresto 24 mg-26 mg oral tablet: 1 tab(s) orally 2 times a day  Plavix 75 mg oral tablet: 1 tab(s) orally once a day  sulfamethoxazole-trimethoprim 800 mg-160 mg oral tablet: 1 tab(s) orally once a day  Toprol-XL 25 mg oral tablet, extended release: 1 tab(s) orally once a day  torsemide 20 mg oral tablet: 1 tab(s) orally once a day

## 2022-04-08 NOTE — DISCHARGE NOTE PROVIDER - DISCHARGE DIET
Confirmed surgery date,time and location, 3/12/21, arrival time at 9:20a.m with nothing to eat eight hours before scheduled surgery time, clear liquids up to two hours before scheduled surgery time, COVID testing 96 hours prior, nurse will call to arrange, h&p will be updated day of surgery, map and letter mailed out.     to complete the following fields:            CHECKLIST     Google Calendar : Yes     Resident notified: Not Applicable     Clinic schedule blocked: Not Applicable    Patient notified:Yes      Pre op information sent: Yes     Given to patient over the phone.Yes    Comments:                   
DASH Diet

## 2022-04-08 NOTE — PROGRESS NOTE ADULT - ASSESSMENT
This is a 41M with PMH CAD, MI (Jan 2022 s/p GARFIELD), left heart thrombus (on Xarelto), HFrEF, colon cancer (s/p resection 2016, and chemotherapy, no complications), HIV who presents for 2 days of shortness of breath. Patient admitted for hypoxemic respiratory failure in the setting of cardiogenic pulmonary edema requiring lasix gtt. Patient with underlying interstitial lung process and given fevers and low CD4, concern for PCP pneumonia, for which pulmonology has been consulted.     #Acute hypoxemic respiratory failure   #Cardiogenic pulmonary edema  #GGO on CT chest    Patient presenting as above with clear component of cardiogenic pulmonary edema requiring BIPAP and HFNC. He has had significant improvement in CXR as well as clinically and now on nasal cannula. CT chest with interlobular septal thickening and central ground glass opacities which can be consistent with fluid overload vs atypical opportunistic infections such as PCP or other fungal or bacterial infections. Nonetheless, the most common cause of pneumonia in immunocompromised patients remains bacterial with Strep pneumo or MSSA.  which is sensitive for PCP pneumonia but not specific. The most likely primary etiology of his shortness of breath remains pulmonary edema. RHC 4/6 with PCWP 21 supporting this diagnosis. Sputum PCP negative.    Recommend:  - On 6L NC, wean as tolerated. Spo2 goal 92% and above  - There is no bacterial pneumonia on CT or CXR. Would favor stopping antibiotics  - Sputum PCP PCR negative, stop treatment dose Bactrim  - Management of heart failure per primary CCU team    Patient discussed with attending Dr. Dorsey. Pulmonology will sign off. Please call us back for any questions or new concerns.

## 2022-04-08 NOTE — PROGRESS NOTE ADULT - ATTENDING COMMENTS
40 YO M with a history of ACC/AHA Stage C ICM (LV 6.2 cm, LVEF 15%), CAD with anterior MI 1/2022 s/p pLAD c/b in in hospital stent thrombosis with inability to revascularize, CKD III (Cr 1.6), HIV with suboptimal control (CD4 < 200), colon cancer s/p resection 2016 with colostomy, prior LV thrombus, and prior tobacco use who was admitted 4/3 with hypoxic respiratory failure due to a combination of acute decompensated heart failure as well as PJP pneumonia. He has been diuresed and treated with bactrim. He underwent RHC with mild-moderately elevated filling pressures and preserved cardiac output    He is euvolemic on exam and tolerating reasonable HF medical therapy. He has high risk features but after speaking to his cardiologist he has been poorly compliant with follow up and medications. He would benefit from HF followup at [facility] and I discussed this with this [facility] cardiologist.     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 defer MRA/SGLT2i given CKD while getting high dose bactrim.  -Diuretics: continue torsemide 20 mg daily  -Device: needs reassessment of LVEF after 3 months GDMT  -Advanced therapies: high risk features but not a candidate for advanced therapies at this time due to poor compliance and poorly controlled HIV with opportunistic infections. I spoke to his cardiologist Dr. Marlo Kapoor and managed HF f/u at [facility] where he follows.    # CAD s/p GARFIELD to LAD 1/2022 c/b IST  - On DAPT  - Statin being decreased due to LDL of 30 and transaminitis, ? interactions with ART and statin     # Dilated aorta  - RPR negative     # CKD III  - Cr stable and tolerating bactrim   -SGLT2i as outpatient if no interactions     # PJP   - Being treated with steroids and prednisone    # HIV  - ID following     # Colon cancer s/o colostomy  - Follows with GI surgery as outpatient and being considered for takedown
No event overnight.  Patient now on 6LNC, breathing comfortably, denied SOB, cough improving. Exam notable for fine crackles at b/l bases. Lab notable for downtrending WBC to 12, crypto ag neg, HIV VL 50k, CT chest showed severe patchy GGO throughout lungs c/w PCP, COVID-19, ARDS or other atypical opportunistic infection.   Patient's respiratory status improved significantly with diuresis.  Most likely bacterial superinfection PNA with CHF exacerbation, rather than PCP PNA as I don't expect patient to gets better after two doses of Bactrim.  High VL is c/w non-compliance to ART.  He should get genotyping at PMD as outpatient.  per primary team who got in touch with PMD, his CD4 count in 12/2021 was ~700.  This is suggestive that patient's current CD4 drop is more likely due to acute infection.   However we cannot rule out the possibility of PCP PNA until we make the diagnosis.  Cont zosyn for bacterial PNA and Bactrim/prednisone for PCP PNA.  f/u fungitell, sputum PCP PCR, histo.  Obtain record from his PMD office.    Team 1 will follow you.  Case d/w primary team.    Natividad Dempsey MD, MS  Infectious Disease attending  work cell [phone]   For any questions during evening/weekend/holiday, please page ID on call
41M w/ pmh of HIV, CAD s/p PCI LAD in Jan '22 c/b acute in-stent thrombosis and cardiogenic shock, ultimately unable to revascularize, sCHF c/b apical thrombus, Colon CA s/p resection w/ colostomy p/w multifactorial acute hypoxemic respiratory failure     -sCHF - acute on chronic sCHF exacerbation - diuresed on Lasix gtt, appears euvolemic to dry on exam and hypotensive - Lasix gtt dc'd overnight; Start Lopressor 12.5 BID will start Entresto when BPs can tolerate;  Heparin gtt dc'd  LV thrombus has now resolved; Otherwise soft BPs but HD stable, Lactate WNL and improved renal function, WWP; TTE: LVEF: ~15%, global hypokinesis, Mild-Mod MR/TR, Dilated LA,   -CAD - h/o recent MI s/p PCI c/b acute in-stent thrombosis and cardiogenic shock, Repeat attempts at PCI were unsuccessful at that time; Currently no evidence of ACS, CP free - c/w Plavix and start ASA; A/C discontinued as patient no longer has LV thrombus  -Pulm - Acute hypoxemic respiratory failure - CXR dense/diffuse b/l CXR infiltrates - Initially appeared as sCHF exacerbation despite dramatic CXR, there has been interval improvement in CXR w/ diuresis. However pt. is now dry w/ little evidence of overload, CXR appearance would be atypical for solely pulmonary edema. Additionally pt. is non-compliant on Biktarvy, CD4<200 and was febrile to 102F on admission; Plan for Chest CT today, Started Bactrim and Steroids; Will wean HFNC to NC as tolerated; Pulmonary team consulted, appreciate recs  -ID - h/o HIV on Biktarvy, ?compliance - diffuse b/l CXR infiltrates, febrile on admission, elevated LDH and inflammatory markers - this combined w/ CD4 count <200 is concerning for PCP PNA; Started Prednisone 40 PO BID and Bactrim IV q8h; Blood Cx's NGTD and f/u Sputum Cx's; c/w Zosyn monotherapy will d/w ID about discontinuation, MRSA swab negative, Covid negative; ID and HIV team consulted, appreciate recs  -DASH diet  -DVT PPx  -Dispo: CCU  -Full Code    Nini Palmer MD  CCU Attending
41M w/ pmh of HIV, CAD s/p PCI LAD in Jan '22 c/b acute in-stent thrombosis and cardiogenic shock, ultimately unable to revascularize, sCHF c/b apical thrombus, Colon CA s/p resection w/ colostomy p/w multifactorial acute hypoxemic respiratory failure    -sCHF - acute on chronic sCHF exacerbation -  currently near euvolemic on exam, WWP and HD stable; s/p RHC yesterday: RA 10, RV 49/12, PA 47/25(33), CO 7.07, CI 3.04, PAsat 65% - Mildly elevated L/R filling pressures w/ normal output, consistent w/ predominantly post-capillary PH 2/2 HFrEF; s/p Lasix 40 IV x 1 yesterday; Plan to start Torsemide PO tmrw; c/w Lopressor 12.5 BID, transition to Toprol 25 tmrw; Plan to start Valsartan 20 BID tmrw; Advanced HF following, appreciate recs  -CAD - h/o recent MI s/p PCI c/b acute in-stent thrombosis and cardiogenic shock, Repeat attempts at PCI were unsuccessful at that time; Currently no evidence of ACS, CP free - c/w Plavix/ASA and Lipitor  -Pulm - Acute hypoxemic respiratory failure - likely multifactorial - 2/2 PCP PNA and sCHF; markedly improved w/ diuresis and Bactrim/Steroids; Now tolerating NC; Will d/c Steroids; c/w Bactrim and will initiate standing PO diuretic  -ID - h/o HIV on Biktarvy, ?compliance - likely w/ PCP PNA - significant improvement on Prednisone and Bactrim IV q8h ; c/w Zosyn monotherapy for 7-day course; Eventual plan to transition from IV Bactrim to PO and will subsequently reduce to prohpylactic dosing, timing pending ID recs; ID and HIV team following, appreciate recs  -DASH diet  -OOB to chair  -DVT PPx  -Dispo: SD to Cardiac Tele  -Full Code    Nini Palmer MD  CCU Attending
41M w/ pmh of HIV, CAD s/p PCI LAD in Jan '22 c/b acute in-stent thrombosis and cardiogenic shock, ultimately unable to revascularize, sCHF c/b apical thrombus, Colon CA s/p resection w/ colostomy p/w multifactorial acute hypoxemic respiratory failure    -sCHF - acute on chronic sCHF exacerbation - mild exacerbation, diuresed during the first 24 hrs, now off diuretic for the past 36hrs roughly. Appears euvolemic to dry on exam; c/w Lopressor 12.5 BID will start Entresto when BPs can tolerate; Will remain off diuretic; BPs remains soft but HD stable, denies lightheadedness, Lactate remains WNL, slight uptrend in renal fxn possible 2/2 Bactrim, remains WWP, good UOP; TTE: LVEF: ~15%, global hypokinesis, Mild-Mod MR/TR, Dilated LA; Plan for RHC today to assess invasive hemodynamics and to help planning for Advanced HF therapies if needed in the future  -CAD - h/o recent MI s/p PCI c/b acute in-stent thrombosis and cardiogenic shock, Repeat attempts at PCI were unsuccessful at that time; Currently no evidence of ACS, CP free - c/w Plavix/ASA and Lipitor  -Pulm - Acute hypoxemic respiratory failure - likely multifactorial - 2/2 PCP PNA and small component of volume overload. s/p CT Chest which shows diffuse groundglass opacities c/w atypical PNA like PCP or Covid(Covid negative). Furthermore patient has seen significant improvement in 02 requirements and CXR since starting Steroids and Bactrim. He's been off diuretic since 4/4. Will c/w Prednisone/Bactrim, pt. now weaned to NC; Pulmonary team following, appreciate recs  -ID - h/o HIV on Biktarvy, ?compliance - diffuse b/l CXR infiltrates, febrile on admission, elevated LDH and inflammatory markers, now s/p CT Chest showing diffuse groundglass opacities c/w atypical PNA; c/w Prednisone 40 PO BID and Bactrim IV q8h - maked clinical improvement since initiation; Blood Cx's NGTD and f/u Sputum Cx's; c/w Zosyn monotherapy, plan to discontinue will d/w ID MRSA swab negative, Covid negative; ID and HIV team consulted, appreciate recs  -DASH diet  -OOB to chair  -DVT PPx  -Dispo: CCU  -Full Code    Nini Palmer MD  CCU Attending
No event overnight.  Patient comfortably breathing on RA, denied cough or SOB.  Lunb clear, WBC normalized to 10, IGRA neg, PCP PCR neg.  PCP PNA ruled out, stop bactrim treatment dose and steroid and switch to Bactrim DS 1 tab daily for PCP ppx.  Switch zosyn to CTX 2g IV q24h to treat superimposed bacterial PNA.  When he is ready to be discharged, switchh to cefpodoxime 200mg PO q12h.  End date of abx to treat PNA is 4/10 (total 7 day course).  Patient should f/u with his HIV PMD for genotyping and repeat CD4 count and HIV VL check once he is fully recovered.      Thank you for your consult.  Please re-consult us or call us with questions.  Case d/w primary team.    Natividad Dempsey MD, MS  Infectious Disease attending  work cell [phone]  For any questions during evening/weekend/holiday, please page ID on call
41M w/ pmh of HIV, CAD s/p recent MI/PCI Jan '22, sCHF c/b apical thrombus, Colon CA s/p resection w/ colostomy p/w acute hypoxemic respiratory failure likely 2/2 acute on chronic sCHF exacerbation    -sCHF - acute on chronic sCHF exacerbation, diffuse dense b/l infiltrates on CXR appears, somewhat atypical for pulmonary edema pattern  -however interval CXR and clinical improvement w// IV diuresis - c/w Lasix gtt@ 5 mg/hr; Will start Lopressor 12.5 BID once euvolemic, likely tmrw and will start Entresto when BPs can tolerate, c/w Heparin gtt for LV thrombus, plan jamison transition back to Xarelto tmrw; Otherwise HD stable, WWP, mildly overloaded on exam; Obtain TTE; Will obtain collateral from outpatient cardiologist  -CAD - h/o recent MI s/p PCI; No evidence of ACS on this admission; c/w Plavix/Lipitor, also on Heparin gtt for LV thrombus  -ID - h/o HIV on Biktarvy - diffuse b/l CXR infiltrates, febrile on admission, elevated LDH and inflammatory markers; Holding steroids, clincally improving w/ IV diuresis; Will keep PCP PNA and other atypical Pneumonia in differential; f/u Blood and Sputum Cx's; c/w Zosyn monotherapy, de-escalate when appropriate, MRSA swab negative, Covid negative; f/u CD4 and VL  -DASH diet  -DVT PPx  -Dispo: CCU  -Full Code    Nini Palmer MD  CCU Attending

## 2022-04-08 NOTE — PROGRESS NOTE ADULT - SUBJECTIVE AND OBJECTIVE BOX
PULMONARY CONSULT SERVICE FOLLOW-UP NOTE    INTERVAL HPI:  Reviewed chart and overnight events; patient seen and examined at bedside. Overall feels much improved. Continues to receive diuresis.    MEDICATIONS:  Antimicrobials:  bictegravir 50 mG/emtricitabine 200 mG/tenofovir alafenamide 25 mG (BIKTARVY) 1 Tablet(s) Oral daily  cefTRIAXone   IVPB 2000 milliGRAM(s) IV Intermittent every 24 hours  trimethoprim  160 mG/sulfamethoxazole 800 mG 1 Tablet(s) Oral daily    Anticoagulants:  aspirin  chewable 81 milliGRAM(s) Oral daily  clopidogrel Tablet 75 milliGRAM(s) Oral daily  heparin   Injectable 5000 Unit(s) SubCutaneous every 8 hours    Cardiac:  metoprolol succinate ER 25 milliGRAM(s) Oral every 24 hours  torsemide 20 milliGRAM(s) Oral every 24 hours  valsartan 20 milliGRAM(s) Oral every 12 hours    Allergies  No Known Allergies    Vital Signs Last 24 Hrs  T(C): 36.3 (08 Apr 2022 04:30), Max: 36.9 (07 Apr 2022 14:00)  T(F): 97.3 (08 Apr 2022 04:30), Max: 98.4 (07 Apr 2022 14:00)  HR: 84 (08 Apr 2022 06:58) (68 - 84)  BP: 117/80 (08 Apr 2022 06:58) (99/64 - 117/80)  BP(mean): 94 (08 Apr 2022 06:58) (72 - 94)  RR: 16 (08 Apr 2022 06:58) (16 - 31)  SpO2: 95% (08 Apr 2022 06:58) (92% - 95%)    04-07 @ 07:01  -  04-08 @ 07:00  --------------------------------------------------------  IN: 1694 mL / OUT: 1750 mL / NET: -56 mL    PHYSICAL EXAM:  Constitutional: WD, comfortable  Head: NC/AT  EENT: anicteric sclera; oropharynx clear, MMM  Neck: supple, no appreciable JVD  Respiratory: bilbasilar crackles; no W/R  Cardiovascular: +S1/S2, regular tachycardia  Gastrointestinal: soft, NT/ND  Extremities: WWP; trace pitting edema, clubbing or cyanosis  Vascular: 2+ radial and pedal pulses  Neurological: alert, oriented    LABS:  CBC Full  -  ( 07 Apr 2022 05:55 )  WBC Count : 10.07 K/uL  RBC Count : 3.60 M/uL  Hemoglobin : 10.4 g/dL  Hematocrit : 29.3 %  Platelet Count - Automated : 350 K/uL  Mean Cell Volume : 81.4 fl  Mean Cell Hemoglobin : 28.9 pg  Mean Cell Hemoglobin Concentration : 35.5 gm/dL  Auto Neutrophil # : 8.12 K/uL  Auto Lymphocyte # : 1.17 K/uL  Auto Monocyte # : 0.70 K/uL  Auto Eosinophil # : 0.00 K/uL  Auto Basophil # : 0.01 K/uL  Auto Neutrophil % : 80.6 %  Auto Lymphocyte % : 11.6 %  Auto Monocyte % : 7.0 %  Auto Eosinophil % : 0.0 %  Auto Basophil % : 0.1 %    04-07    135  |  101  |  25<H>  ----------------------------<  126<H>  4.3   |  24  |  1.56<H>    Ca    8.0<L>      07 Apr 2022 05:55  Phos  3.9     04-07  Mg     2.4     04-07    TPro  6.2  /  Alb  2.7<L>  /  TBili  0.3  /  DBili  x   /  AST  48<H>  /  ALT  97<H>  /  AlkPhos  53  04-07    RADIOLOGY & ADDITIONAL STUDIES: Reviewed.

## 2022-04-08 NOTE — PROGRESS NOTE ADULT - SUBJECTIVE AND OBJECTIVE BOX
HF Consult    Mr Ling continues improving, asymptomatic laying flat, no chest complaints  Tele - few couplets/PVC, NSR otherwise    OBJECTIVE  Vitals:  T(C): 36.7 (04-08-22 @ 10:36), Max: 36.9 (04-07-22 @ 14:00)  HR: 86 (04-08-22 @ 13:23) (72 - 90)  BP: 105/71 (04-08-22 @ 13:23) (99/64 - 117/80)  RR: 18 (04-08-22 @ 12:23) (16 - 26)  SpO2: 94% (04-08-22 @ 13:23) (92% - 95%)  Wt(kg): --    I/O:  I&O's Summary    07 Apr 2022 07:01  -  08 Apr 2022 07:00  --------------------------------------------------------  IN: 1694 mL / OUT: 1750 mL / NET: -56 mL    08 Apr 2022 07:01  -  08 Apr 2022 13:39  --------------------------------------------------------  IN: 225 mL / OUT: 1675 mL / NET: -1450 mL        PHYSICAL EXAM:  Appearance: NAD. Speaking in full sentences. laying flat comfortably  HEENT: No pallor noted.  Conjunctiva clear b/l. Moist oral mucosa.  Cardiovascular: RRR s1s2 with no murmurs.  Respiratory: basilar dry crackles, good air entry throughout  Gastrointestinal:  Soft, nontender. Non-distended. Non-rigid.	  Extremities: No edema b/l. No erythema b/l. LE WWP b/l.  Vascular: DP intact  Neurologic:  Alert and awake. Moving all extremities. Following commands.   	  LABS:                        11.5   11.30 )-----------( 429      ( 08 Apr 2022 07:33 )             32.8     04-08    134<L>  |  103  |  27<H>  ----------------------------<  81  4.3   |  21<L>  |  1.64<H>    Ca    8.5      08 Apr 2022 07:33  Phos  3.8     04-08  Mg     2.2     04-08    TPro  6.4  /  Alb  2.8<L>  /  TBili  0.3  /  DBili  x   /  AST  38  /  ALT  97<H>  /  AlkPhos  62  04-08          RADIOLOGY & ADDITIONAL TESTS:  Reviewed .    MEDICATIONS  (STANDING):  aspirin  chewable 81 milliGRAM(s) Oral daily  atorvastatin 40 milliGRAM(s) Oral at bedtime  bictegravir 50 mG/emtricitabine 200 mG/tenofovir alafenamide 25 mG (BIKTARVY) 1 Tablet(s) Oral daily  chlorhexidine 2% Cloths 1 Application(s) Topical <User Schedule>  clopidogrel Tablet 75 milliGRAM(s) Oral daily  dextrose 5%. 1000 milliLiter(s) (100 mL/Hr) IV Continuous <Continuous>  dextrose 5%. 1000 milliLiter(s) (50 mL/Hr) IV Continuous <Continuous>  dextrose 50% Injectable 25 Gram(s) IV Push once  dextrose 50% Injectable 12.5 Gram(s) IV Push once  dextrose 50% Injectable 25 Gram(s) IV Push once  glucagon  Injectable 1 milliGRAM(s) IntraMuscular once  heparin   Injectable 5000 Unit(s) SubCutaneous every 8 hours  insulin lispro (ADMELOG) corrective regimen sliding scale   SubCutaneous Before meals and at bedtime  metoprolol succinate ER 25 milliGRAM(s) Oral every 24 hours  pantoprazole    Tablet 40 milliGRAM(s) Oral before breakfast  sacubitril 24 mG/valsartan 26 mG 1 Tablet(s) Oral every 12 hours  torsemide 20 milliGRAM(s) Oral every 24 hours  trimethoprim  160 mG/sulfamethoxazole 800 mG 1 Tablet(s) Oral daily    MEDICATIONS  (PRN):  dextrose Oral Gel 15 Gram(s) Oral once PRN Blood Glucose LESS THAN 70 milliGRAM(s)/deciliter

## 2022-04-08 NOTE — PHYSICAL THERAPY INITIAL EVALUATION ADULT - PERTINENT HX OF CURRENT PROBLEM, REHAB EVAL
41M PMH CAD, MI (Jan 2022, stent), Left heart thrombus (Xarelto) CHF, colon cancer (s/p resection 2016), HIV (Biktarvy), former smoker (1pack/week for 3 years) BIBEMS for worsening shortness of breath for two days.

## 2022-04-08 NOTE — DISCHARGE NOTE NURSING/CASE MANAGEMENT/SOCIAL WORK - PATIENT PORTAL LINK FT
You can access the FollowMyHealth Patient Portal offered by [facility] by registering at the following website: [email or web address]. By joining [facility]’s FollowMyHealth portal, you will also be able to view your health information using other applications (apps) compatible with our system.

## 2022-04-08 NOTE — DISCHARGE NOTE PROVIDER - PROVIDER TOKENS
FREE:[LAST:[Emelia],FIRST:[Marlo],PHONE:[phone]],FAX:[phone]    -],ADDRESS:[[address] [place] [address]],SCHEDULEDAPPT:[04/19/2022],SCHEDULEDAPPTTIME:[02:00 PM]]

## 2022-04-08 NOTE — PROGRESS NOTE ADULT - PROVIDER SPECIALTY LIST ADULT
CCU
Heart Failure
Infectious Disease
Pulmonology
CCU
Infectious Disease
Pulmonology
Pulmonology
Cardiology

## 2022-04-08 NOTE — DISCHARGE NOTE PROVIDER - HOSPITAL COURSE
#Discharge: do not delete    Hospital Course: 40 y/o M with PMH CAD (MI Jan 2022, s/p 1 stent placed to pLAD, restenosed), hx left heart thrombus (on Xarelto, no longer visualized since 2/2022), HFrEF (EF 10-15% on current admission), HIV (?compliance on Biktarvy), hx colon CA (s/p resection with chemo 2016, PAWAN) BIBEMS for worsening SOB x2 days, admitted for management of AHRF (initially requiring BIPAP) 2/2 bacterial/PCP pneumonia with contribution from CHF exacerbation and Sepsis with likely pulmonary source.  Patient was admitted to CCU for management of AHRF, with improved significantly after diuresis  with Lasix drip and starting treatment for pneumonia with Zosyn, Bactrim (tx dose for PCP), and prednisone.  After initial diuresis patient was clinically euvolemic and diuresis was held for several days.  . Upon admission, vanc/zosyn started for empiric coverage. Lasix gtt @5mg/hr started for volume overload on exam. Decadron discontinued to monitor clinical course on diuretics alone. Respiratory status continued to improved, patient now weaned to 2L NC.  UA+ for small LE, no nitrites. Ucx showing e. coli, however, pt denies any urinary symptoms. Repeat TTE showed EF 10-15%, dilated LV, severely dilated LA, mild/mod TR/MR, and no remaining thrombus. HIV treatment with Biktarvy was resumed. Sputum PCP PCR negative, steroids discontinued, Bactrim changed to prophylaxis dose.  RHC with high filling pressure and normal CO with evidence of pulmonary hypertension.  Originally there was plan to do bronch but that was deferred given the high risk due to poor cardiac function.  Overall patient much improved clinically, has remained hemodynamically stable, stable for step-down to cardiac tele.    #Acute decompensated heart failure -improved   -Patient with HFrEF (EF 10-15%) likely due to ischemic cardiomyopathy. Primary cardiologist is at [facility] (Dr. Saniya Ramirez and Dr. Marlo Kapoor). Home meds: Entresto, torsemide, Toprol XL.  On admission, signs and symptoms of exacerbation diffuse pulmonary edema on XR, volume overloaded on exam with edema, S3, ProBNP 34,267. S/p lasix 80mg and NG drip in ED, briefly on Lasix drip, currently being monitored off diuretics.    -TTE (4/4) shows EF 10-15%, no LV thrombus, dilated LV, severely dilated LA, mild/mod TR/MR  -RHC with high filling pressure, normal CO, evidence of PH  - d/c on Toporol 25 daily, home Entresto as well as torsemide 20mg  - Advanced HF team following, appreciate recs; will f/u with Cardiology Dr. Marlo Kapoor on 4/19    # Pneumonia (resolved)  -Patient with bacterial pneumonia, AHRF on admission, BIPAP in ED with improvement in SpO2, subsequently on HFNC, now weaned to 2LNC  -COVID, MRSA nares, Ur legionella/strep neg. Sputum PCP PCR negative.  Crypto antigen negative.  - Per ID, discontinued Zosyn on 4/7, and started on Ceftriaxone 2g daily. However per ID and Pulm, low suspicion for pneumonia and believe symptoms are secondary to fluid overload, no need for further antibiotics  - Patient initially started on [id number] mg IV q8h but since sputum PCP negative, will transition to prophylaxis bactrim DS 1 pill qd on 4/7  - Per recommendation from Pulm and ID, steroids were discontinued 4/7    #KULWINDER (acute kidney injury).   -Scr 1.97 admission --> 1.66 4/6 (Unknown baseline) --> 1.56 (4/7)  -unknown baseline. Pt to follow up with his Cardiologist on 4/19 for repeat testing/monitoring of Cr    #HIV disease.   - PMH HIV (dx 2010). States compliance on Biktarvy. Per pt, last sexual encounter was over 1 year ago. Follows at [facility].  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  - continue to f/u outpatient    # CAD (coronary artery disease) #History of MI s/p PCI  MI in Jan 2022 at [facility], stent placed to pLAD, stent re-stenosed. Home medications Plavix & Xarelto, atorvastatin 80mg. Primary cardiologist is at [facility] (Dr. Saniya Ramirez and Dr. Marlo Kapoor). Lipid panel unremarkable.  - Discontinued home Xarelto 15mg (no longer with LV thrombus, s/p 3mo tx)  - Continue ASA 81mg daily  - Continue Plavix 75mg daily  - Continue atorvastatin 80mg daily    #H/o LV thrombus  After initial MI in 1/2022, small LV thrombus. Last seen on 1/6. No longer seen on subsequent studies (2/2022). Thrombus not seen on TTE on current admission. Now s/p 3 months of Xarelto.  - Discontinued home Xarelto 15mg (no longer with LV thrombus, s/p 3mo tx).    Patient was discharged to: Home    New medications: Bactrim, Restart ASA  Changes to old medications: None  Medications that were stopped: Xarelto    Items to follow up as outpatient: Cardiology, PCP (Monitor Cr)    Physical exam at the time of discharge:  GENERAL: laying in bed comfortably, no acute distress. speaking in full sentences on RA  HEAD:  Atraumatic, Normocephalic  EYES: EOMI, PERRLA, conjunctiva and sclera clear  NECK: Supple, No JVD  NERVOUS SYSTEM:  Alert & Awake.   CHEST/LUNG: B/L good air entry; No rales, rhonchi, or wheezing  HEART: S1S2 normal, no S3, Regular rate and rhythm; No murmurs  ABDOMEN: Soft, Nontender, Nondistended; Bowel sounds present  EXTREMITIES:  2+ Peripheral Pulses, No clubbing, cyanosis, or edema     #Discharge: do not delete    Hospital Course: 42 y/o M with PMH CAD (MI Jan 2022, s/p 1 stent placed to pLAD, restenosed), hx left heart thrombus (on Xarelto, no longer visualized since 2/2022), HFrEF (EF 10-15% on current admission), HIV (?compliance on Biktarvy), hx colon CA (s/p resection with chemo 2016, PAWAN) BIBEMS for worsening SOB x2 days, admitted for management of AHRF (initially requiring BIPAP) 2/2 bacterial/PCP pneumonia with contribution from CHF exacerbation and Sepsis with likely pulmonary source.  Patient was admitted to CCU for management of AHRF, with improved significantly after diuresis  with Lasix drip and starting treatment for pneumonia with Zosyn, Bactrim (tx dose for PCP), and prednisone.  After initial diuresis patient was clinically euvolemic and diuresis was held for several days.  . Upon admission, vanc/zosyn started for empiric coverage. Lasix gtt @5mg/hr started for volume overload on exam. Decadron discontinued to monitor clinical course on diuretics alone. Respiratory status continued to improved, patient now weaned to 2L NC.  UA+ for small LE, no nitrites. Ucx showing e. coli, however, pt denies any urinary symptoms. Repeat TTE showed EF 10-15%, dilated LV, severely dilated LA, mild/mod TR/MR, and no remaining thrombus. HIV treatment with Biktarvy was resumed. Sputum PCP PCR negative, steroids discontinued, Bactrim changed to prophylaxis dose.  RHC with high filling pressure and normal CO with evidence of pulmonary hypertension.  Originally there was plan to do bronch but that was deferred given the high risk due to poor cardiac function.  Overall patient much improved clinically, has remained hemodynamically stable, stable for discharge. No further antibiotics per ID and pulm. Recommendations per HF for discharge.    #Acute decompensated heart failure -improved   -Patient with HFrEF (EF 10-15%) likely due to ischemic cardiomyopathy. Primary cardiologist is at [facility] (Dr. Saniya Ramirez and Dr. Marlo Kapoor). Home meds: Entresto, torsemide, Toprol XL.  On admission, signs and symptoms of exacerbation diffuse pulmonary edema on XR, volume overloaded on exam with edema, S3, ProBNP 34,267. S/p lasix 80mg and NG drip in ED, briefly on Lasix drip, currently being monitored off diuretics.    -TTE (4/4) shows EF 10-15%, no LV thrombus, dilated LV, severely dilated LA, mild/mod TR/MR  -RHC with high filling pressure, normal CO, evidence of PH  - d/c on Toporol 25 daily, home Entresto as well as torsemide 20mg  - Advanced HF team following, appreciate recs; will f/u with Cardiology Dr. Marlo Kapoor on 4/19    Dx: TREATMENT FOR STEMI or HEART FAILURE THIS ADMISSION: YES' HF         If Yes to STEMI or Heart Failure: 7 day Follow-Up Appointment made: NO; available only on 4/19           Cardiac Rehab Indications (STEMI/NSTEMI/ACS/Unstable Angina/CHF/Chronic Stable Angina/Heart Surgery (CABG,Valve)/Post PCI):            *Education on benefits of Cardiac Rehab provided to patient: Yes         *Referral and Prescription Given for Cardiac Rehab: Yes/No.  If No, Why Not?  (see reasons below)         *Pt given list of locations & instructed to contact their insurance company to review list of participating providers. Yes          *Pt instructed to bring Cardiac Rehab prescription with them to Cardiology Follow up appointment for assistance with enrollment: Yes    (Reasons for No Cardiac Rehab Referral Rx - must document 1 or more options):                Patient Refused            Medical Reason: ex: needs Home Care, Home PT, severe or symptomatic AS            Patient lacks medical coverage for Cardiac Rehab            Pt discharged to Nursing Care/PASHA/Long term Care Facility            Patient Lacks Transportation or no cardiac rehab within 60 minutes driving range            Patient already participates in Cardiac Rehab            Other: (provide details) ex: Pt discharged to Hospice; prescription printer not working & pt was instructed to obtain Rx from outpt Cardiologist.    AMI: Beta Blocker Prescribed: Yes/No. If no, Why not?            ACE-I/ARB Prescribed: Yes/No. If no, Why not? ex: Entresto prescribed, Not indicated at this time, worsening renal function    # Pneumonia (resolved)  -Patient with bacterial pneumonia, AHRF on admission, BIPAP in ED with improvement in SpO2, subsequently on HFNC, now weaned to 2LNC  -COVID, MRSA nares, Ur legionella/strep neg. Sputum PCP PCR negative.  Crypto antigen negative.  - Per ID, discontinued Zosyn on 4/7, and started on Ceftriaxone 2g daily. However per ID and Pulm, low suspicion for pneumonia and believe symptoms are secondary to fluid overload, no need for further antibiotics  - Patient initially started on [id number] mg IV q8h but since sputum PCP negative, will transition to prophylaxis bactrim DS 1 pill qd on 4/7  - Per recommendation from Pulm and ID, steroids were discontinued 4/7    #KULWINDER (acute kidney injury).   -Scr 1.97 admission --> 1.66 4/6 (Unknown baseline) --> 1.56 (4/7)  -unknown baseline. Pt to follow up with his Cardiologist on 4/19 for repeat testing/monitoring of Cr    #HIV disease.   - PMH HIV (dx 2010). States compliance on Biktarvy. Per pt, last sexual encounter was over 1 year ago. Follows at [facility].  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  - continue to f/u outpatient    # CAD (coronary artery disease) #History of MI s/p PCI  MI in Jan 2022 at [facility], stent placed to [facility], stent re-stenosed. Home medications Plavix & Xarelto, atorvastatin 80mg. Primary cardiologist is at [facility] (Dr. Saniya Ramirez and Dr. Marlo Kapoor). Lipid panel unremarkable.  - Discontinued home Xarelto 15mg (no longer with LV thrombus, s/p 3mo tx)  - Continue ASA 81mg daily  - Continue Plavix 75mg daily  - Continue atorvastatin 80mg daily    #H/o LV thrombus  After initial MI in 1/2022, small LV thrombus. Last seen on 1/6. No longer seen on subsequent studies (2/2022). Thrombus not seen on TTE on current admission. Now s/p 3 months of Xarelto.  - Discontinued home Xarelto 15mg (no longer with LV thrombus, s/p 3mo tx).    Patient was discharged to: Home    New medications: Bactrim, Restart ASA  Changes to old medications: None  Medications that were stopped: Xarelto    Items to follow up as outpatient: Cardiology, PCP (Monitor Cr)    Physical exam at the time of discharge:  GENERAL: laying in bed comfortably, no acute distress. speaking in full sentences on RA  HEAD:  Atraumatic, Normocephalic  EYES: EOMI, PERRLA, conjunctiva and sclera clear  NECK: Supple, No JVD  NERVOUS SYSTEM:  Alert & Awake.   CHEST/LUNG: B/L good air entry; No rales, rhonchi, or wheezing  HEART: S1S2 normal, no S3, Regular rate and rhythm; No murmurs  ABDOMEN: Soft, Nontender, Nondistended; Bowel sounds present  EXTREMITIES:  2+ Peripheral Pulses, No clubbing, cyanosis, or edema     #Discharge: do not delete    Hospital Course: 42 y/o M with PMH CAD (MI Jan 2022, s/p 1 stent placed to pLAD, restenosed), hx left heart thrombus (on Xarelto, no longer visualized since 2/2022), HFrEF (EF 10-15% on current admission), HIV (?compliance on Biktarvy), hx colon CA (s/p resection with chemo 2016, PAWAN) BIBEMS for worsening SOB x2 days, admitted for management of AHRF (initially requiring BIPAP) 2/2 bacterial/PCP pneumonia with contribution from CHF exacerbation and Sepsis with likely pulmonary source.  Patient was admitted to CCU for management of AHRF, with improved significantly after diuresis  with Lasix drip and starting treatment for pneumonia with Zosyn, Bactrim (tx dose for PCP), and prednisone.  After initial diuresis patient was clinically euvolemic and diuresis was held for several days.  . Upon admission, vanc/zosyn started for empiric coverage. Lasix gtt @5mg/hr started for volume overload on exam. Decadron discontinued to monitor clinical course on diuretics alone. Respiratory status continued to improved, patient now weaned to 2L NC.  UA+ for small LE, no nitrites. Ucx showing e. coli, however, pt denies any urinary symptoms. Repeat TTE showed EF 10-15%, dilated LV, severely dilated LA, mild/mod TR/MR, and no remaining thrombus. HIV treatment with Biktarvy was resumed. Sputum PCP PCR negative, steroids discontinued, Bactrim changed to prophylaxis dose.  RHC with high filling pressure and normal CO with evidence of pulmonary hypertension.  Originally there was plan to do bronch but that was deferred given the high risk due to poor cardiac function.  Overall patient much improved clinically, has remained hemodynamically stable, stable for discharge. No further antibiotics per ID and pulm. Recommendations per HF for discharge.    #Acute decompensated heart failure -improved   -Patient with HFrEF (EF 10-15%) likely due to ischemic cardiomyopathy. Primary cardiologist is at [facility] (Dr. Saniya Ramirez and Dr. Marlo Kapoor). Home meds: Entresto, torsemide, Toprol XL.  On admission, signs and symptoms of exacerbation diffuse pulmonary edema on XR, volume overloaded on exam with edema, S3, ProBNP 34,267. S/p lasix 80mg and NG drip in ED, briefly on Lasix drip, currently being monitored off diuretics.    -TTE (4/4) shows EF 10-15%, no LV thrombus, dilated LV, severely dilated LA, mild/mod TR/MR  -RHC with high filling pressure, normal CO, evidence of PH  - d/c on Toporol 25 daily, home Entresto as well as torsemide 20mg  - Advanced HF team following, appreciate recs; will f/u with Cardiology Dr. Marlo Kapoor on 4/19    Dx: TREATMENT FOR STEMI or HEART FAILURE THIS ADMISSION: YES' HF         If Yes to STEMI or Heart Failure: 7 day Follow-Up Appointment made: NO; available only on 4/19           Cardiac Rehab Indications (STEMI/NSTEMI/ACS/Unstable Angina/CHF/Chronic Stable Angina/Heart Surgery (CABG,Valve)/Post PCI):            *Education on benefits of Cardiac Rehab provided to patient: Yes         *Referral and Prescription Given for Cardiac Rehab: Yes/No.  If No, Why Not?  (see reasons below)         *Pt given list of locations & instructed to contact their insurance company to review list of participating providers. Yes          *Pt instructed to bring Cardiac Rehab prescription with them to Cardiology Follow up appointment for assistance with enrollment: Yes    (Reasons for No Cardiac Rehab Referral Rx - must document 1 or more options):                Patient Refused            Medical Reason: ex: needs Home Care, Home PT, severe or symptomatic AS            Patient lacks medical coverage for Cardiac Rehab            Pt discharged to Nursing Care/PASHA/Long term Care Facility            Patient Lacks Transportation or no cardiac rehab within 60 minutes driving range            Patient already participates in Cardiac Rehab            Other: (provide details) ex: Pt discharged to Hospice; prescription printer not working & pt was instructed to obtain Rx from outpt Cardiologist.    AMI: Beta Blocker Prescribed: Yes/No. If no, Why not?            ACE-I/ARB Prescribed: Yes/No. If no, Why not? ex: Entresto prescribed, Not indicated at this time, worsening renal function    # Pneumonia (resolved)  -Patient with bacterial pneumonia, AHRF on admission, BIPAP in ED with improvement in SpO2, subsequently on HFNC, now weaned to 2LNC  -COVID, MRSA nares, Ur legionella/strep neg. Sputum PCP PCR negative.  Crypto antigen negative.  - Per ID, discontinued Zosyn on 4/7, and started on Ceftriaxone 2g daily. However per ID and Pulm, low suspicion for pneumonia and believe symptoms are secondary to fluid overload, no need for further antibiotics  - Patient initially started on [id number] mg IV q8h but since sputum PCP negative, will transition to prophylaxis bactrim DS 1 pill qd on 4/7  - Per recommendation from Pulm and ID, steroids were discontinued 4/7    #KULWINDER (acute kidney injury).   -Scr 1.97 admission --> 1.66 4/6 (Unknown baseline) --> 1.56 (4/7)  -unknown baseline. Pt to follow up with his Cardiologist on 4/19 for repeat testing/monitoring of Cr    #HIV disease.   - PMH HIV (dx 2010). States compliance on Biktarvy. Per pt, last sexual encounter was over 1 year ago. Follows at [facility].  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  - continue to f/u outpatient    # CAD (coronary artery disease) #History of MI s/p PCI  MI in Jan 2022 at [facility], stent placed to [facility], stent re-stenosed. Home medications Plavix & Xarelto, atorvastatin 80mg. Primary cardiologist is at [facility] (Dr. Saniya Ramirez and Dr. Marlo Kapoor). Lipid panel unremarkable.  - Discontinued home Xarelto 15mg (no longer with LV thrombus, s/p 3mo tx)  - Continue ASA 81mg daily  - Continue Plavix 75mg daily  - Transition from atorvastatin 80mg daily to Crestor 20    #H/o LV thrombus  After initial MI in 1/2022, small LV thrombus. Last seen on 1/6. No longer seen on subsequent studies (2/2022). Thrombus not seen on TTE on current admission. Now s/p 3 months of Xarelto.  - Discontinued home Xarelto 15mg (no longer with LV thrombus, s/p 3mo tx).    Patient was discharged to: Home    New medications: Bactrim, Restart ASA, Crestor 20  Changes to old medications: None  Medications that were stopped: Xarelto, Atorvastatin    Items to follow up as outpatient: Cardiology, PCP (Monitor Cr)    Physical exam at the time of discharge:  GENERAL: laying in bed comfortably, no acute distress. speaking in full sentences on RA  HEAD:  Atraumatic, Normocephalic  EYES: EOMI, PERRLA, conjunctiva and sclera clear  NECK: Supple, No JVD  NERVOUS SYSTEM:  Alert & Awake.   CHEST/LUNG: B/L good air entry; No rales, rhonchi, or wheezing  HEART: S1S2 normal, no S3, Regular rate and rhythm; No murmurs  ABDOMEN: Soft, Nontender, Nondistended; Bowel sounds present  EXTREMITIES:  2+ Peripheral Pulses, No clubbing, cyanosis, or edema

## 2022-04-08 NOTE — PROGRESS NOTE ADULT - TIME BILLING
Patient seen and examined with house-staff during bedside rounds.  Resident note read, including vitals, physical findings, laboratory data, and radiological reports.   Revisions included below.  Direct personal management at bed side and extensive interpretation of the data.  Plan was outlined and discussed in details with the housestaff.  Decision making of high complexity  Action taken for acute disease activity to reflect the level of care provided:  - medication reconciliation  - review laboratory data  PCR for PCP is negative off steroids and on prophylaxis dose of bactrium weaned off oxygen
Patient seen and examined with house-staff during bedside rounds.  Resident note read, including vitals, physical findings, laboratory data, and radiological reports.   Revisions included below.  Direct personal management at bed side and extensive interpretation of the data.  Plan was outlined and discussed in details with the housestaff.  Decision making of high complexity  Action taken for acute disease activity to reflect the level of care provided:  - medication reconciliation  - review laboratory data  the [facility] report was reviewed.  The picture is consistent with fluid overload.  Hold on bronch await sputum PCR.  DC steroids
Patient seen and examined with house-staff during bedside rounds.  Resident note read, including vitals, physical findings, laboratory data, and radiological reports.   Revisions included below.  Direct personal management at bed side and extensive interpretation of the data.  Plan was outlined and discussed in details with the housestaff.  Decision making of high complexity  Action taken for acute disease activity to reflect the level of care provided:  - medication reconciliation  - review laboratory data      Patient improved.  The patient tolerated nasal cannula.  Chest x-ray improved compared to the last 3 x-rays.  The picture is consistent with fluid overload.  Cannot rule underlying PCP.  Need cardiac clearance for bronchoscopy.  The blood pressure is on the low side and might not tolerate sedation.  Await decision from cardiology for clearance

## 2022-04-09 LAB
CULTURE RESULTS: SIGNIFICANT CHANGE UP
CULTURE RESULTS: SIGNIFICANT CHANGE UP
H CAPSUL AG SER IA-MCNC: SIGNIFICANT CHANGE UP
SPECIMEN SOURCE: SIGNIFICANT CHANGE UP
SPECIMEN SOURCE: SIGNIFICANT CHANGE UP

## 2022-04-15 DIAGNOSIS — B20 HUMAN IMMUNODEFICIENCY VIRUS [HIV] DISEASE: ICD-10-CM

## 2022-04-15 DIAGNOSIS — Z79.01 LONG TERM (CURRENT) USE OF ANTICOAGULANTS: ICD-10-CM

## 2022-04-15 DIAGNOSIS — I50.23 ACUTE ON CHRONIC SYSTOLIC (CONGESTIVE) HEART FAILURE: ICD-10-CM

## 2022-04-15 DIAGNOSIS — B18.1 CHRONIC VIRAL HEPATITIS B WITHOUT DELTA-AGENT: ICD-10-CM
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
## 2022-04-15 DIAGNOSIS — I47.1 SUPRAVENTRICULAR TACHYCARDIA: ICD-10-CM

## 2022-04-15 DIAGNOSIS — R82.71 BACTERIURIA: ICD-10-CM

## 2022-04-15 DIAGNOSIS — A41.9 SEPSIS, UNSPECIFIED ORGANISM: ICD-10-CM

## 2022-04-15 DIAGNOSIS — N17.9 ACUTE KIDNEY FAILURE, UNSPECIFIED: ICD-10-CM

## 2022-04-15 DIAGNOSIS — Z93.3 COLOSTOMY STATUS: ICD-10-CM

## 2022-04-15 DIAGNOSIS — Z82.49 FAMILY HISTORY OF ISCHEMIC HEART DISEASE AND OTHER DISEASES OF THE CIRCULATORY SYSTEM: ICD-10-CM

## 2022-04-15 DIAGNOSIS — Z85.038 PERSONAL HISTORY OF OTHER MALIGNANT NEOPLASM OF LARGE INTESTINE: ICD-10-CM

## 2022-04-15 DIAGNOSIS — I25.2 OLD MYOCARDIAL INFARCTION: ICD-10-CM

## 2022-04-15 DIAGNOSIS — J96.01 ACUTE RESPIRATORY FAILURE WITH HYPOXIA: ICD-10-CM

## 2022-04-15 DIAGNOSIS — Z87.891 PERSONAL HISTORY OF NICOTINE DEPENDENCE: ICD-10-CM

## 2022-04-15 DIAGNOSIS — B59 PNEUMOCYSTOSIS: ICD-10-CM

## 2022-04-15 DIAGNOSIS — Z92.21 PERSONAL HISTORY OF ANTINEOPLASTIC CHEMOTHERAPY: ICD-10-CM

## 2022-04-15 DIAGNOSIS — E87.3 ALKALOSIS: ICD-10-CM

## 2022-04-15 DIAGNOSIS — F32.A DEPRESSION, UNSPECIFIED: ICD-10-CM

## 2022-04-15 DIAGNOSIS — I77.819 AORTIC ECTASIA, UNSPECIFIED SITE: ICD-10-CM

## 2022-04-15 DIAGNOSIS — Z95.5 PRESENCE OF CORONARY ANGIOPLASTY IMPLANT AND GRAFT: ICD-10-CM

## 2022-04-15 DIAGNOSIS — R65.20 SEVERE SEPSIS WITHOUT SEPTIC SHOCK: ICD-10-CM

## 2022-04-15 DIAGNOSIS — I27.20 PULMONARY HYPERTENSION, UNSPECIFIED: ICD-10-CM

## 2022-04-15 DIAGNOSIS — I25.10 ATHEROSCLEROTIC HEART DISEASE OF NATIVE CORONARY ARTERY WITHOUT ANGINA PECTORIS: ICD-10-CM
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
## 2022-04-15 DIAGNOSIS — I25.5 ISCHEMIC CARDIOMYOPATHY: ICD-10-CM

## 2022-04-15 DIAGNOSIS — T82.855A STENOSIS OF CORONARY ARTERY STENT, INITIAL ENCOUNTER: ICD-10-CM

## 2022-04-15 DIAGNOSIS — N18.30 CHRONIC KIDNEY DISEASE, STAGE 3 UNSPECIFIED: ICD-10-CM

## 2024-03-05 NOTE — ED PROVIDER NOTE - CROS ED GI ALL NEG
negative...